# Patient Record
Sex: FEMALE | Race: WHITE | NOT HISPANIC OR LATINO | URBAN - METROPOLITAN AREA
[De-identification: names, ages, dates, MRNs, and addresses within clinical notes are randomized per-mention and may not be internally consistent; named-entity substitution may affect disease eponyms.]

---

## 2020-12-28 DIAGNOSIS — M19.90 UNSPECIFIED OSTEOARTHRITIS, UNSPECIFIED SITE: ICD-10-CM

## 2020-12-28 DIAGNOSIS — E78.5 HYPERLIPIDEMIA, UNSPECIFIED: ICD-10-CM

## 2020-12-28 DIAGNOSIS — Z87.81 PERSONAL HISTORY OF (HEALED) TRAUMATIC FRACTURE: ICD-10-CM

## 2020-12-29 ENCOUNTER — OUTPATIENT (OUTPATIENT)
Dept: OUTPATIENT SERVICES | Facility: HOSPITAL | Age: 75
LOS: 1 days | End: 2020-12-29
Payer: COMMERCIAL

## 2020-12-29 ENCOUNTER — APPOINTMENT (OUTPATIENT)
Dept: CARDIOTHORACIC SURGERY | Facility: CLINIC | Age: 75
End: 2020-12-29
Payer: MEDICARE

## 2020-12-29 VITALS
HEIGHT: 67 IN | DIASTOLIC BLOOD PRESSURE: 75 MMHG | WEIGHT: 145 LBS | SYSTOLIC BLOOD PRESSURE: 150 MMHG | BODY MASS INDEX: 22.76 KG/M2 | TEMPERATURE: 97.9 F | HEART RATE: 77 BPM | OXYGEN SATURATION: 98 % | RESPIRATION RATE: 13 BRPM

## 2020-12-29 DIAGNOSIS — I35.0 NONRHEUMATIC AORTIC (VALVE) STENOSIS: ICD-10-CM

## 2020-12-29 DIAGNOSIS — I10 ESSENTIAL (PRIMARY) HYPERTENSION: ICD-10-CM

## 2020-12-29 DIAGNOSIS — Z82.49 FAMILY HISTORY OF ISCHEMIC HEART DISEASE AND OTHER DISEASES OF THE CIRCULATORY SYSTEM: ICD-10-CM

## 2020-12-29 DIAGNOSIS — Z82.3 FAMILY HISTORY OF STROKE: ICD-10-CM

## 2020-12-29 DIAGNOSIS — G45.9 TRANSIENT CEREBRAL ISCHEMIC ATTACK, UNSPECIFIED: ICD-10-CM

## 2020-12-29 LAB
ALBUMIN SERPL ELPH-MCNC: 4.7 G/DL
ALP BLD-CCNC: 65 U/L
ALT SERPL-CCNC: 11 U/L
ANION GAP SERPL CALC-SCNC: 12 MMOL/L
AST SERPL-CCNC: 18 U/L
BASOPHILS # BLD AUTO: 0.05 K/UL
BASOPHILS NFR BLD AUTO: 0.9 %
BILIRUB SERPL-MCNC: 0.4 MG/DL
BUN SERPL-MCNC: 23 MG/DL
CALCIUM SERPL-MCNC: 10.1 MG/DL
CHLORIDE SERPL-SCNC: 100 MMOL/L
CO2 SERPL-SCNC: 25 MMOL/L
CREAT SERPL-MCNC: 0.6 MG/DL
EOSINOPHIL # BLD AUTO: 0.18 K/UL
EOSINOPHIL NFR BLD AUTO: 3.2 %
GLUCOSE SERPL-MCNC: 98 MG/DL
HCT VFR BLD CALC: 35.7 %
HGB BLD-MCNC: 11.3 G/DL
IMM GRANULOCYTES NFR BLD AUTO: 0.4 %
LYMPHOCYTES # BLD AUTO: 1.89 K/UL
LYMPHOCYTES NFR BLD AUTO: 34.1 %
MAN DIFF?: NORMAL
MCHC RBC-ENTMCNC: 29.8 PG
MCHC RBC-ENTMCNC: 31.7 GM/DL
MCV RBC AUTO: 94.2 FL
MONOCYTES # BLD AUTO: 0.61 K/UL
MONOCYTES NFR BLD AUTO: 11 %
NEUTROPHILS # BLD AUTO: 2.8 K/UL
NEUTROPHILS NFR BLD AUTO: 50.4 %
NT-PROBNP SERPL-MCNC: 768 PG/ML
PLATELET # BLD AUTO: 258 K/UL
POTASSIUM SERPL-SCNC: 3.7 MMOL/L
PROT SERPL-MCNC: 6.9 G/DL
RBC # BLD: 3.79 M/UL
RBC # FLD: 13.1 %
SODIUM SERPL-SCNC: 137 MMOL/L
WBC # FLD AUTO: 5.55 K/UL

## 2020-12-29 PROCEDURE — 93306 TTE W/DOPPLER COMPLETE: CPT

## 2020-12-29 PROCEDURE — 99072 ADDL SUPL MATRL&STAF TM PHE: CPT

## 2020-12-29 PROCEDURE — 99203 OFFICE O/P NEW LOW 30 MIN: CPT

## 2020-12-29 PROCEDURE — 93306 TTE W/DOPPLER COMPLETE: CPT | Mod: 26

## 2020-12-29 PROCEDURE — 93000 ELECTROCARDIOGRAM COMPLETE: CPT

## 2020-12-29 PROCEDURE — 99205 OFFICE O/P NEW HI 60 MIN: CPT

## 2020-12-29 NOTE — ASSESSMENT
[FreeTextEntry1] : Maggie is a 75 year old female with PMH of prior TIA, HTN, HLD, OA, multiple spine compression fractures, TIA and aortic valve stenosis. She was seen by her PCP and noted to have a heart murmur. Echocardiogram demonstrated  CHERYL 0.4, mGr 40, pGr 83, AoV 4.5 LVEF 70%.  She was referred for surgical consultation by Dr. Perez for consideration for RYAN. Her exercise tolerance is limited due to arthritis; she requires a walker for ambulation. She denies angina, dyspnea, PND, orthopnea, dizziness, or LE edema.

## 2020-12-29 NOTE — PHYSICAL EXAM
[General Appearance - Well Developed] : well developed [Normal Appearance] : normal appearance [General Appearance - Well Nourished] : well nourished [Normal Conjunctiva] : the conjunctiva exhibited no abnormalities [Normal Oral Mucosa] : normal oral mucosa [Normal Jugular Venous A Waves Present] : normal jugular venous A waves present [Normal Jugular Venous V Waves Present] : normal jugular venous V waves present [No Jugular Venous Bourgeois A Waves] : no jugular venous bourgeois A waves [Normal Rate] : normal [Rhythm Regular] : regular [IV] : a grade 4 [III] : a grade 3 [1+] : left 1+ [___ +] : bilateral [unfilled]U+ pretibial pitting edema [Respiration, Rhythm And Depth] : normal respiratory rhythm and effort [Auscultation Breath Sounds / Voice Sounds] : lungs were clear to auscultation bilaterally [Bowel Sounds] : normal bowel sounds [Abdomen Tenderness] : non-tender [Abnormal Walk] : normal gait [Cyanosis, Localized] : no localized cyanosis [Skin Turgor] : normal skin turgor [] : no rash [Oriented To Time, Place, And Person] : oriented to person, place, and time [Impaired Insight] : insight and judgment were intact [Affect] : the affect was normal

## 2020-12-29 NOTE — HISTORY OF PRESENT ILLNESS
[FreeTextEntry1] : Maggie is a 75 year old female with PMH of prior TIA, HTN, HLD, OA, multiple spine compression fractures, TIA and aortic valve stenosis. She was seen by her PCP and noted to have a heart murmur. Echocardiogram demonstrated  CHERYL 0.4, mGr 40, pGr 83, AoV 4.5 LVEF 70%.  She was referred for surgical consultation by Dr. Perez for consideration for RYAN. Her exercise tolerance is limited due to arthritis; she requires a walker for ambulation. She denies angina, dyspnea, PND, orthopnea, dizziness, or LE edema. \par \par \par  \par

## 2020-12-29 NOTE — REVIEW OF SYSTEMS
[Feeling Tired] : feeling tired [Joint Pain] : joint pain [Joint Swelling] : joint swelling [Joint Stiffness] : joint stiffness [Negative] : Heme/Lymph [Chest Pain] : no chest pain [Dizziness] : no dizziness [Fainting] : no fainting

## 2020-12-29 NOTE — PHYSICAL EXAM
[General Appearance - Alert] : alert [General Appearance - In No Acute Distress] : in no acute distress [Sclera] : the sclera and conjunctiva were normal [PERRL With Normal Accommodation] : pupils were equal in size, round, and reactive to light [Extraocular Movements] : extraocular movements were intact [Outer Ear] : the ears and nose were normal in appearance [Oropharynx] : the oropharynx was normal [Jugular Venous Distention Increased] : there was no jugular-venous distention [Respiration, Rhythm And Depth] : normal respiratory rhythm and effort [Auscultation Breath Sounds / Voice Sounds] : lungs were clear to auscultation bilaterally [IV] : a grade 4 [Examination Of The Chest] : the chest was normal in appearance [Right Carotid Bruit] : right carotid bruit heard [Left Carotid Bruit] : left carotid bruit heard [Breast Appearance] : normal in appearance [Bowel Sounds] : normal bowel sounds [Abdomen Soft] : soft [Cervical Lymph Nodes Enlarged Posterior Bilaterally] : posterior cervical [Cervical Lymph Nodes Enlarged Anterior Bilaterally] : anterior cervical [No CVA Tenderness] : no ~M costovertebral angle tenderness [Skin Color & Pigmentation] : normal skin color and pigmentation [Oriented To Time, Place, And Person] : oriented to person, place, and time [FreeTextEntry1] : deferred

## 2020-12-29 NOTE — CONSULT LETTER
[Dear  ___] : Dear ~LUIZ, [Courtesy Letter:] : I had the pleasure of seeing your patient, [unfilled], in my office today. [Please see my note below.] : Please see my note below. [Consult Closing:] : Thank you very much for allowing me to participate in the care of this patient.  If you have any questions, please do not hesitate to contact me. [Sincerely,] : Sincerely, [FreeTextEntry2] : Chio Perez MD\par [address] [facility] \[place] [address] [FreeTextEntry3] : Donta Madrid MD, FACS\par Attending Surgeon \par Cardiovascular & Thoracic Surgery\par  \par [employer] [facility] [employer] [employer]\par \par

## 2020-12-30 LAB
SARS-COV-2 IGG SERPL IA-ACNC: 5.19 INDEX
SARS-COV-2 IGG SERPL QL IA: POSITIVE

## 2020-12-30 NOTE — HISTORY OF PRESENT ILLNESS
[FreeTextEntry1] : Mrs. Zamora is a 75 year old female referred by Dr. Perez for evaluation of aortic stenosis. Her past medical history includes HTN, HLD, severe hip OA, multiple spinal compression fractures, and a prior TIA  in January 2014. Her PCP recently auscultated a murmur and referred her for further evaluation. Amlodipine was recently added for HTN. She used to swim in her building for exercise, but the pool was closed due to the COVID-19 pandemic, and has been very sedentary since that time. She thinks she was previously exposed to COVID but never became ill.\par \par Her exercise tolerance is also limited due to arthritis; she requires a walker for ambulation. She denies angina, dyspnea, PND, orthopnea, dizziness, or LE edema. She suffered a mechanical fall in October that exacerbated the pain in her left hip and further limited her activity level. \par \par Repeat echocardiogram today demonstrates severe AS with peak and mean gradients of 130 and 65mmHg, respectively, an AoV of 5.7m/s, and an CHERYL of  0.7 sq cm. There was noted LVOT calcification and a hyperdynamic LV with a mild gradient, but no significant LVOTO.\par \par She is very frustrated by her medical issues, which require frequent "back and forth" visits to doctor's offices. She has severe chronic pain due to her OA and scoliosis and notes it is even painful taking a car service here today. She notes she is not sure that she wants to have anything done at this point and wants "to spend the rest of my time at home doing what I like" which includes reading and watching TV. She notes having significant fatigue and has to nap frequently. She reports no angina. She gets out of breath "bending over to pick something up off the floor" or "standing up doing dishes for too long" (NYHA II); she is also bothered by the face masks. She has not syncopized.

## 2020-12-30 NOTE — REVIEW OF SYSTEMS
[Feeling Fatigued] : feeling fatigued [Eyeglasses] : currently wearing eyeglasses [Dyspnea on exertion] : dyspnea during exertion [Lower Ext Edema] : lower extremity edema [see HPI] : see HPI [Negative] : Endocrine [Fever] : no fever [Chills] : no chills [Shortness Of Breath] : no shortness of breath [Chest  Pressure] : no chest pressure [Abdominal Pain] : no abdominal pain [Nausea] : no nausea [Change in Appetite] : no change in appetite [Dizziness] : no dizziness [Confusion] : no confusion was observed [Anxiety] : no anxiety [Easy Bleeding] : no tendency for easy bleeding [Easy Bruising] : no tendency for easy bruising

## 2020-12-30 NOTE — DISCUSSION/SUMMARY
[Very Severe (Critical) Aortic Stenosis] : very severe (critical) aortic stenosis [Deteriorating] : deteriorating [Multidetector Cardiac CT] : multidetector cardiac CT [Cardiac Catheterization] : cardiac catheterization [Coronary Angiography] : coronary angiography [None] : none [Aortic Valve Replacement] : aortic valve replacement [Patient] : the patient [FreeTextEntry1] : Maggie has critical aortic stenosis with recently progressive symptoms and functional decline, which are multifactorial in etiology (she also has severe, mobility limiting arthritis). I have had an extensive discussion with her regarding the potential risks and benefits of TAVR, including but not limited to death, stroke, vascular complications, and the possible need for a pacemaker; the hope and anticipation would be to prolong her life and improve her QOL and functional capacity (understanding full well that she will still have some functional limitations due to her orthopedic issues). She is tearful and depressed, asking what the alternative option (i.e. medical therapy) would look like, and I explained that the prognosis would be very poor. She agrees to proceeding with scheduling her necessary testing in anticipation of proceeding with eventual TAVR. Full labs today. I have reviewed her TTE findings (critical AS) with her in detail.

## 2021-01-21 ENCOUNTER — APPOINTMENT (OUTPATIENT)
Dept: CARDIOLOGY | Facility: CLINIC | Age: 76
End: 2021-01-21

## 2021-01-21 ENCOUNTER — OUTPATIENT (OUTPATIENT)
Dept: OUTPATIENT SERVICES | Facility: HOSPITAL | Age: 76
LOS: 1 days | End: 2021-01-21
Payer: COMMERCIAL

## 2021-01-21 ENCOUNTER — RESULT REVIEW (OUTPATIENT)
Age: 76
End: 2021-01-21

## 2021-01-21 DIAGNOSIS — I35.0 NONRHEUMATIC AORTIC (VALVE) STENOSIS: ICD-10-CM

## 2021-01-21 DIAGNOSIS — R07.9 CHEST PAIN, UNSPECIFIED: ICD-10-CM

## 2021-01-21 PROCEDURE — 75572 CT HRT W/3D IMAGE: CPT

## 2021-01-21 PROCEDURE — 75572 CT HRT W/3D IMAGE: CPT | Mod: 26

## 2021-01-26 ENCOUNTER — LABORATORY RESULT (OUTPATIENT)
Age: 76
End: 2021-01-26

## 2021-01-26 ENCOUNTER — APPOINTMENT (OUTPATIENT)
Dept: DISASTER EMERGENCY | Facility: CLINIC | Age: 76
End: 2021-01-26

## 2021-01-29 ENCOUNTER — RESULT REVIEW (OUTPATIENT)
Age: 76
End: 2021-01-29

## 2021-01-29 ENCOUNTER — INPATIENT (INPATIENT)
Facility: HOSPITAL | Age: 76
LOS: 1 days | Discharge: HOME | DRG: 247 | End: 2021-01-31
Attending: STUDENT IN AN ORGANIZED HEALTH CARE EDUCATION/TRAINING PROGRAM | Admitting: INTERNAL MEDICINE
Payer: COMMERCIAL

## 2021-01-29 ENCOUNTER — TRANSCRIPTION ENCOUNTER (OUTPATIENT)
Age: 76
End: 2021-01-29

## 2021-01-29 VITALS
WEIGHT: 149.91 LBS | RESPIRATION RATE: 18 BRPM | SYSTOLIC BLOOD PRESSURE: 149 MMHG | HEIGHT: 67 IN | DIASTOLIC BLOOD PRESSURE: 93 MMHG | OXYGEN SATURATION: 98 % | TEMPERATURE: 98 F | HEART RATE: 87 BPM

## 2021-01-29 DIAGNOSIS — Z95.5 PRESENCE OF CORONARY ANGIOPLASTY IMPLANT AND GRAFT: Chronic | ICD-10-CM

## 2021-01-29 DIAGNOSIS — Z98.49 CATARACT EXTRACTION STATUS, UNSPECIFIED EYE: Chronic | ICD-10-CM

## 2021-01-29 DIAGNOSIS — I35.0 NONRHEUMATIC AORTIC (VALVE) STENOSIS: ICD-10-CM

## 2021-01-29 LAB
ALBUMIN SERPL ELPH-MCNC: 3.8 G/DL — SIGNIFICANT CHANGE UP (ref 3.3–5)
ALP SERPL-CCNC: 56 U/L — SIGNIFICANT CHANGE UP (ref 40–120)
ALT FLD-CCNC: 13 U/L — SIGNIFICANT CHANGE UP (ref 10–45)
ANION GAP SERPL CALC-SCNC: 12 MMOL/L — SIGNIFICANT CHANGE UP (ref 5–17)
APPEARANCE UR: ABNORMAL
AST SERPL-CCNC: 13 U/L — SIGNIFICANT CHANGE UP (ref 10–40)
BACTERIA # UR AUTO: ABNORMAL
BILIRUB DIRECT SERPL-MCNC: 0.1 MG/DL — SIGNIFICANT CHANGE UP (ref 0–0.2)
BILIRUB INDIRECT FLD-MCNC: 0.5 MG/DL — SIGNIFICANT CHANGE UP (ref 0.2–1)
BILIRUB SERPL-MCNC: 0.6 MG/DL — SIGNIFICANT CHANGE UP (ref 0.2–1.2)
BILIRUB UR-MCNC: NEGATIVE — SIGNIFICANT CHANGE UP
BLD GP AB SCN SERPL QL: NEGATIVE — SIGNIFICANT CHANGE UP
BUN SERPL-MCNC: 21 MG/DL — SIGNIFICANT CHANGE UP (ref 7–23)
CALCIUM SERPL-MCNC: 9.7 MG/DL — SIGNIFICANT CHANGE UP (ref 8.4–10.5)
CHLORIDE SERPL-SCNC: 100 MMOL/L — SIGNIFICANT CHANGE UP (ref 96–108)
CO2 SERPL-SCNC: 24 MMOL/L — SIGNIFICANT CHANGE UP (ref 22–31)
COLOR SPEC: ABNORMAL
CREAT SERPL-MCNC: 0.58 MG/DL — SIGNIFICANT CHANGE UP (ref 0.5–1.3)
DIFF PNL FLD: ABNORMAL
EPI CELLS # UR: 1 /HPF — SIGNIFICANT CHANGE UP
GLUCOSE SERPL-MCNC: 110 MG/DL — HIGH (ref 70–99)
GLUCOSE UR QL: NEGATIVE — SIGNIFICANT CHANGE UP
HCT VFR BLD CALC: 31.1 % — LOW (ref 34.5–45)
HCT VFR BLD CALC: 34.5 % — SIGNIFICANT CHANGE UP (ref 34.5–45)
HCT VFR BLD CALC: 36.7 % — SIGNIFICANT CHANGE UP (ref 34.5–45)
HGB BLD-MCNC: 10.5 G/DL — LOW (ref 11.5–15.5)
HGB BLD-MCNC: 11.3 G/DL — LOW (ref 11.5–15.5)
HGB BLD-MCNC: 12 G/DL — SIGNIFICANT CHANGE UP (ref 11.5–15.5)
INR BLD: 1 RATIO — SIGNIFICANT CHANGE UP (ref 0.88–1.16)
KETONES UR-MCNC: NEGATIVE — SIGNIFICANT CHANGE UP
LEUKOCYTE ESTERASE UR-ACNC: NEGATIVE — SIGNIFICANT CHANGE UP
MAGNESIUM SERPL-MCNC: 1.8 MG/DL — SIGNIFICANT CHANGE UP (ref 1.6–2.6)
MCHC RBC-ENTMCNC: 30 PG — SIGNIFICANT CHANGE UP (ref 27–34)
MCHC RBC-ENTMCNC: 30.5 PG — SIGNIFICANT CHANGE UP (ref 27–34)
MCHC RBC-ENTMCNC: 30.5 PG — SIGNIFICANT CHANGE UP (ref 27–34)
MCHC RBC-ENTMCNC: 32.7 GM/DL — SIGNIFICANT CHANGE UP (ref 32–36)
MCHC RBC-ENTMCNC: 32.8 GM/DL — SIGNIFICANT CHANGE UP (ref 32–36)
MCHC RBC-ENTMCNC: 33.8 GM/DL — SIGNIFICANT CHANGE UP (ref 32–36)
MCV RBC AUTO: 90.4 FL — SIGNIFICANT CHANGE UP (ref 80–100)
MCV RBC AUTO: 91.8 FL — SIGNIFICANT CHANGE UP (ref 80–100)
MCV RBC AUTO: 93 FL — SIGNIFICANT CHANGE UP (ref 80–100)
NITRITE UR-MCNC: NEGATIVE — SIGNIFICANT CHANGE UP
NRBC # BLD: 0 /100 WBCS — SIGNIFICANT CHANGE UP (ref 0–0)
PH UR: 8.5 — HIGH (ref 5–8)
PLATELET # BLD AUTO: 244 K/UL — SIGNIFICANT CHANGE UP (ref 150–400)
PLATELET # BLD AUTO: 270 K/UL — SIGNIFICANT CHANGE UP (ref 150–400)
PLATELET # BLD AUTO: 275 K/UL — SIGNIFICANT CHANGE UP (ref 150–400)
POTASSIUM SERPL-MCNC: 3.8 MMOL/L — SIGNIFICANT CHANGE UP (ref 3.5–5.3)
POTASSIUM SERPL-SCNC: 3.8 MMOL/L — SIGNIFICANT CHANGE UP (ref 3.5–5.3)
PROT SERPL-MCNC: 6.1 G/DL — SIGNIFICANT CHANGE UP (ref 6–8.3)
PROT UR-MCNC: ABNORMAL
PROTHROM AB SERPL-ACNC: 12 SEC — SIGNIFICANT CHANGE UP (ref 10.6–13.6)
RBC # BLD: 3.44 M/UL — LOW (ref 3.8–5.2)
RBC # BLD: 3.71 M/UL — LOW (ref 3.8–5.2)
RBC # BLD: 4 M/UL — SIGNIFICANT CHANGE UP (ref 3.8–5.2)
RBC # FLD: 13 % — SIGNIFICANT CHANGE UP (ref 10.3–14.5)
RBC # FLD: 13 % — SIGNIFICANT CHANGE UP (ref 10.3–14.5)
RBC # FLD: 13.3 % — SIGNIFICANT CHANGE UP (ref 10.3–14.5)
RBC CASTS # UR COMP ASSIST: >50 /HPF — HIGH (ref 0–4)
RH IG SCN BLD-IMP: POSITIVE — SIGNIFICANT CHANGE UP
SODIUM SERPL-SCNC: 136 MMOL/L — SIGNIFICANT CHANGE UP (ref 135–145)
SP GR SPEC: 1.03 — HIGH (ref 1.01–1.02)
UROBILINOGEN FLD QL: NEGATIVE — SIGNIFICANT CHANGE UP
WBC # BLD: 13.63 K/UL — HIGH (ref 3.8–10.5)
WBC # BLD: 5.68 K/UL — SIGNIFICANT CHANGE UP (ref 3.8–10.5)
WBC # BLD: 5.94 K/UL — SIGNIFICANT CHANGE UP (ref 3.8–10.5)
WBC # FLD AUTO: 13.63 K/UL — HIGH (ref 3.8–10.5)
WBC # FLD AUTO: 5.68 K/UL — SIGNIFICANT CHANGE UP (ref 3.8–10.5)
WBC # FLD AUTO: 5.94 K/UL — SIGNIFICANT CHANGE UP (ref 3.8–10.5)
WBC UR QL: 1 /HPF — SIGNIFICANT CHANGE UP (ref 0–5)

## 2021-01-29 PROCEDURE — 99222 1ST HOSP IP/OBS MODERATE 55: CPT

## 2021-01-29 PROCEDURE — 99152 MOD SED SAME PHYS/QHP 5/>YRS: CPT

## 2021-01-29 PROCEDURE — 93010 ELECTROCARDIOGRAM REPORT: CPT | Mod: 76,77

## 2021-01-29 PROCEDURE — 88112 CYTOPATH CELL ENHANCE TECH: CPT | Mod: 26

## 2021-01-29 PROCEDURE — 93454 CORONARY ARTERY ANGIO S&I: CPT | Mod: 26,59

## 2021-01-29 PROCEDURE — 92928 PRQ TCAT PLMT NTRAC ST 1 LES: CPT | Mod: LD

## 2021-01-29 PROCEDURE — 93010 ELECTROCARDIOGRAM REPORT: CPT | Mod: 59

## 2021-01-29 PROCEDURE — 74176 CT ABD & PELVIS W/O CONTRAST: CPT | Mod: 26

## 2021-01-29 RX ORDER — GABAPENTIN 400 MG/1
1 CAPSULE ORAL
Qty: 0 | Refills: 0 | DISCHARGE
Start: 2021-01-29

## 2021-01-29 RX ORDER — CLOPIDOGREL BISULFATE 75 MG/1
1 TABLET, FILM COATED ORAL
Qty: 90 | Refills: 1
Start: 2021-01-29 | End: 2021-07-27

## 2021-01-29 RX ORDER — CLOPIDOGREL BISULFATE 75 MG/1
75 TABLET, FILM COATED ORAL DAILY
Refills: 0 | Status: DISCONTINUED | OUTPATIENT
Start: 2021-01-30 | End: 2021-01-31

## 2021-01-29 RX ORDER — OXYBUTYNIN CHLORIDE 5 MG
5 TABLET ORAL THREE TIMES A DAY
Refills: 0 | Status: DISCONTINUED | OUTPATIENT
Start: 2021-01-29 | End: 2021-01-31

## 2021-01-29 RX ORDER — CLOPIDOGREL BISULFATE 75 MG/1
1 TABLET, FILM COATED ORAL
Qty: 0 | Refills: 1 | DISCHARGE
Start: 2021-01-29 | End: 2021-07-27

## 2021-01-29 RX ORDER — ASPIRIN/CALCIUM CARB/MAGNESIUM 324 MG
81 TABLET ORAL DAILY
Refills: 0 | Status: DISCONTINUED | OUTPATIENT
Start: 2021-01-30 | End: 2021-01-31

## 2021-01-29 RX ORDER — SODIUM CHLORIDE 9 MG/ML
250 INJECTION INTRAMUSCULAR; INTRAVENOUS; SUBCUTANEOUS ONCE
Refills: 0 | Status: COMPLETED | OUTPATIENT
Start: 2021-01-29 | End: 2021-01-29

## 2021-01-29 RX ORDER — IBUPROFEN 200 MG
1 TABLET ORAL
Qty: 0 | Refills: 0 | DISCHARGE

## 2021-01-29 RX ORDER — ACETAMINOPHEN 500 MG
1000 TABLET ORAL ONCE
Refills: 0 | Status: COMPLETED | OUTPATIENT
Start: 2021-01-29 | End: 2021-01-29

## 2021-01-29 RX ORDER — ASPIRIN/CALCIUM CARB/MAGNESIUM 324 MG
1 TABLET ORAL
Qty: 0 | Refills: 0 | DISCHARGE
Start: 2021-01-29

## 2021-01-29 RX ORDER — GABAPENTIN 400 MG/1
2 CAPSULE ORAL
Qty: 0 | Refills: 0 | DISCHARGE

## 2021-01-29 RX ORDER — LISINOPRIL 2.5 MG/1
5 TABLET ORAL DAILY
Refills: 0 | Status: DISCONTINUED | OUTPATIENT
Start: 2021-01-29 | End: 2021-01-31

## 2021-01-29 RX ORDER — AMLODIPINE BESYLATE 2.5 MG/1
5 TABLET ORAL DAILY
Refills: 0 | Status: DISCONTINUED | OUTPATIENT
Start: 2021-01-29 | End: 2021-01-31

## 2021-01-29 RX ORDER — GABAPENTIN 400 MG/1
300 CAPSULE ORAL THREE TIMES A DAY
Refills: 0 | Status: DISCONTINUED | OUTPATIENT
Start: 2021-01-29 | End: 2021-01-31

## 2021-01-29 RX ADMIN — Medication 400 MILLIGRAM(S): at 18:46

## 2021-01-29 RX ADMIN — GABAPENTIN 300 MILLIGRAM(S): 400 CAPSULE ORAL at 21:26

## 2021-01-29 RX ADMIN — SODIUM CHLORIDE 250 MILLILITER(S): 9 INJECTION INTRAMUSCULAR; INTRAVENOUS; SUBCUTANEOUS at 18:01

## 2021-01-29 RX ADMIN — Medication 5 MILLIGRAM(S): at 21:26

## 2021-01-29 NOTE — DISCHARGE NOTE PROVIDER - HOSPITAL COURSE
75 year old female with PMH of prior TIA, HTN, HLD, OA, multiple spine compression fractures, TIA and aortic valve stenosis. She was seen by her PCP and noted to have a heart murmur. Echocardiogram demonstrated CHERYL 0.4, mGr 40, pGr 83, AoV 4.5 LVEF 70%. She was referred for surgical consultation by Dr. Perez for consideration for RYAN. Her exercise tolerance is limited due to arthritis; she requires a walker for ambulation. She denies angina, dyspnea, PND, orthopnea, dizziness, or LE edema. s/p cardiac cath today KILEY - mLAD (80%).   (29 Jan 2021 13:03)    01/29/20 s/p [facility] KILEY x 1 ( ASA and Plavix )  75 year old female with PMH of prior TIA, HTN, HLD, OA, multiple spine compression fractures, TIA and aortic valve stenosis. She was seen by her PCP and noted to have a heart murmur. Echocardiogram demonstrated CHERYL 0.4, mGr 40, pGr 83, AoV 4.5 LVEF 70%. She was referred for surgical consultation by Dr. Perez for consideration for RYAN. Her exercise tolerance is limited due to arthritis; she requires a walker for ambulation. She denies angina, dyspnea, PND, orthopnea, dizziness, or LE edema. s/p cardiac cath today KILEY - mLAD (80%).   (29 Jan 2021 13:03)    01/29/20 s/p C KILEY x 1 ( ASA and Plavix )   S/P Hematuria now improved, f/u op pmd

## 2021-01-29 NOTE — CONSULT NOTE ADULT - ASSESSMENT
75 year old female with hematuria, s/p KILYE 1/29    - 75 year old female with hematuria, s/p KILEY 1/29    -fluid hydration as tolerated  -f/u CT findings  -obtain urinalysis, urine culture, and urine cytopathology  - 75 year old female with hematuria, s/p KILEY 1/29    -fluid hydration as tolerated to clear the urine  -f/u CT findings  -obtain urinalysis, urine culture, and urine cytopathology  -obtain PVR  - 75 year old female with hematuria, s/p KILEY 1/29    -fluid hydration as tolerated to clear the urine  -obtain urinalysis, urine culture, and urine cytopathology  -transfuse prn  -if pt develops clots in the urine will need a 3 way catheter and CBI -- notify urology if this occurs  -outpatient follow up for cystoscopy  -case d/w Dr Hastings

## 2021-01-29 NOTE — DISCHARGE NOTE PROVIDER - CARE PROVIDER_API CALL
Josesito Paula)  Interventional Cardiology  [address] [place] [address]  Phone: [phone]  Fax: [phone]  Follow Up Time:    Josesito Paula)  Interventional Cardiology  [address] [place] [address]  Phone: [phone]  Fax: [phone]  Follow Up Time:     Audrey tran  Phone: [phone]  Fax: (   )    -  Follow Up Time: 1 week

## 2021-01-29 NOTE — H&P CARDIOLOGY - HISTORY OF PRESENT ILLNESS
Mrs. Zamora is a 75 year old female with PMH of prior TIA, HTN, HLD, OA, multiple spine compression fractures, TIA and aortic valve stenosis. She was seen by her PCP and noted to have a heart murmur. Echocardiogram demonstrated CHERYL 0.4, mGr 40, pGr 83, AoV 4.5 LVEF 70%. She was referred for surgical consultation by Dr. Perez for consideration for RYAN. Her exercise tolerance is limited due to arthritis; she requires a walker for ambulation. She denies angina, dyspnea, PND, orthopnea, dizziness, or LE edema. s/p cardiac cath today KILEY - mLAD (80%).

## 2021-01-29 NOTE — H&P CARDIOLOGY - PMH
CAD (coronary artery disease)    Compression fracture of spine    HLD (hyperlipidemia)    Hypertension    OA (osteoarthritis)

## 2021-01-29 NOTE — DISCHARGE NOTE PROVIDER - NSDCMRMEDTOKEN_GEN_ALL_CORE_FT
amLODIPine 5 mg oral tablet: 1 tab(s) orally once a day  aspirin 81 mg oral delayed release tablet: 1 tab(s) orally once a day  clopidogrel 75 mg oral tablet: 1 tab(s) orally once a day  Ditropan 5 mg oral tablet: 1 tab(s) orally 3 times a day  gabapentin 300 mg oral capsule: 1 cap(s) orally 3 times a day  lovastatin 40 mg oral tablet: 1 tab(s) orally once a day  Neurontin 300 mg oral capsule: 1 cap(s) orally 3 times a day  ramipril 1.25 mg oral tablet: 1 tab(s) orally once a day   amLODIPine 5 mg oral tablet: 1 tab(s) orally once a day  aspirin 81 mg oral delayed release tablet: 1 tab(s) orally once a day  cefadroxil 500 mg oral capsule: 1 cap(s) orally every 12 hours   clopidogrel 75 mg oral tablet: 1 tab(s) orally once a day  Ditropan 5 mg oral tablet: 1 tab(s) orally 3 times a day  gabapentin 300 mg oral capsule: 1 cap(s) orally 3 times a day  lovastatin 40 mg oral tablet: 1 tab(s) orally once a day  Neurontin 300 mg oral capsule: 1 cap(s) orally 3 times a day  ramipril 1.25 mg oral tablet: 1 tab(s) orally once a day

## 2021-01-29 NOTE — PROVIDER CONTACT NOTE (CHANGE IN STATUS NOTIFICATION) - ASSESSMENT
Pt hypotensive (see float sheet for vs) primafit in place and 350 cc bright red bloody urine noted in canister

## 2021-01-29 NOTE — DISCHARGE NOTE PROVIDER - NSFTFHOMEHTHYNRD_GEN_ALL_CORE
VACCINE ADMINISTRATION RECORD  PARENT / GUARDIAN APPROVAL  Date: 2018  Vaccine administered to: Nica Frost     : 2018    MRN: [id number]    A copy of the appropriate Centers for Disease Control and Prevention Vaccine Information statement Yes

## 2021-01-29 NOTE — DISCHARGE NOTE PROVIDER - NSDCFUADDAPPT_GEN_ALL_CORE_FT
F/u outpatient Urology for possible cystoscopy  F/u outpatient Urology for possible cystoscopy   Follow up with pmd in 1 week to check Hematuria / bmp/cbc

## 2021-01-29 NOTE — DISCHARGE NOTE PROVIDER - CARE PROVIDERS DIRECT ADDRESSES
,[email or web address],DirectAddress_Unknown

## 2021-01-29 NOTE — DISCHARGE NOTE PROVIDER - NSDCCPTREATMENT_GEN_ALL_CORE_FT
PRINCIPAL PROCEDURE  Procedure: Left heart catheterization  Findings and Treatment: KILEY x 1 LAD

## 2021-01-29 NOTE — DISCHARGE NOTE PROVIDER - PROVIDER TOKENS
Addended by: KATEY RIVAS on: 11/27/2017 04:59 PM     Modules accepted: Orders     PROVIDER:[TOKEN:[[id number]]] PROVIDER:[TOKEN:[[id number]]],FREE:[LAST:[shteyman],FIRST:[Audrey],PHONE:[phone]],FAX:[(   )    -],FOLLOWUP:[1 week]]

## 2021-01-29 NOTE — CONSULT NOTE ADULT - SUBJECTIVE AND OBJECTIVE BOX
UROLOGY CONSULT NOTE    HPI:  This is a 75y old Female with PMHx of TIA, HTN, HLD, OA, s/p KILEY to the LAD today, who was found to have gorss hematuria following the procedure.  The patient received a dose of aspirin and 600mg of plavix prior to this occurring.  she states that she has never had an episode of hematuria.  She states that she has been prescribed oxybutynin by her PMD and denies any other  history.  Admits to feeling weak and having left hip pain, but denies fevers, chills, N/V, CP, SOB, abdominal or flank pain.      PAST MEDICAL HISTORY    Compression fracture of spine    OA (osteoarthritis)    CAD (coronary artery disease)    HLD (hyperlipidemia)    Hypertension        PAST SURGICAL HISTORY    History of cataract surgery    History of coronary artery stent placement        FAMILY HISTORY    Noncontributory    SOCIAL HISTORY    Former smoker for 20 yrs, quit 30 years ago    HOME MEDICATIONS    amLODIPine 5 mg oral tablet: 1 tab(s) orally once a day (29 Jan 2021 11:49)  Ditropan 5 mg oral tablet: 1 tab(s) orally 3 times a day (29 Jan 2021 11:49)  ibuprofen 400 mg oral tablet: 1 tab(s) orally every 6 hours (29 Jan 2021 11:49)  lovastatin 40 mg oral tablet: 1 tab(s) orally once a day (29 Jan 2021 11:49)  Neurontin 300 mg oral capsule: 1 cap(s) orally 3 times a day (29 Jan 2021 11:49)  Neurontin 300 mg oral capsule: 2 cap(s) orally once a day (at bedtime) (29 Jan 2021 11:49)  ramipril 1.25 mg oral tablet: 1 tab(s) orally once a day (29 Jan 2021 11:49)      DRUG ALLERGIES    Vicodin (Vomiting; Nausea)      REVIEW OF SYSTEMS: Pertinent positives and negatives as stated in HPI, otherwise negative      VITAL SIGNS    T(F): 97.5, Max: 97.7 (01-29-21 @ 09:01)  HR: 88  BP: 136/59  RR: 20  SpO2: 100%    I's & O's        PHYSICAL EXAM    Gen: Well groomed, well dressed, well nourished  Abd: Soft, NT, +suprapubic pressure  : +blood at meatus, no evidence of clots of vaginal bleeding  Ext: No edema present b/l      LABS:                        11.3   5.68  )-----------( 270               34.5     136  |  100  |  21  ----------------------------<  110  3.8   |  24  |  0.58    Ca    9.7  Mg     1.8              Urine culture:       IMAGING:      CT:  UROLOGY CONSULT NOTE    HPI:  This is a 75y old Female with PMHx of TIA, HTN, HLD, OA, s/p KILEY to the LAD today, who was found to have gorss hematuria following the procedure.  The patient received a dose of aspirin and 600mg of plavix prior to this occurring.  she states that she has never had an episode of hematuria.  She states that she has been prescribed oxybutynin by her PMD and denies any other  history.  Admits to feeling weak and having left hip pain, but denies fevers, chills, N/V, CP, SOB, abdominal or flank pain.  Pt is voiding via primafit catheter with bright red urine in the canister.      PAST MEDICAL HISTORY    Compression fracture of spine    OA (osteoarthritis)    CAD (coronary artery disease)    HLD (hyperlipidemia)    Hypertension        PAST SURGICAL HISTORY    History of cataract surgery    History of coronary artery stent placement        FAMILY HISTORY    Noncontributory    SOCIAL HISTORY    Former smoker for 20 yrs, quit 30 years ago    HOME MEDICATIONS    amLODIPine 5 mg oral tablet: 1 tab(s) orally once a day (29 Jan 2021 11:49)  Ditropan 5 mg oral tablet: 1 tab(s) orally 3 times a day (29 Jan 2021 11:49)  ibuprofen 400 mg oral tablet: 1 tab(s) orally every 6 hours (29 Jan 2021 11:49)  lovastatin 40 mg oral tablet: 1 tab(s) orally once a day (29 Jan 2021 11:49)  Neurontin 300 mg oral capsule: 1 cap(s) orally 3 times a day (29 Jan 2021 11:49)  Neurontin 300 mg oral capsule: 2 cap(s) orally once a day (at bedtime) (29 Jan 2021 11:49)  ramipril 1.25 mg oral tablet: 1 tab(s) orally once a day (29 Jan 2021 11:49)      DRUG ALLERGIES    Vicodin (Vomiting; Nausea)      REVIEW OF SYSTEMS: Pertinent positives and negatives as stated in HPI, otherwise negative      VITAL SIGNS    T(F): 97.5, Max: 97.7 (01-29-21 @ 09:01)  HR: 88  BP: 136/59  RR: 20  SpO2: 100%        PHYSICAL EXAM    Gen: Well groomed, well dressed, well nourished  Abd: Soft, NT, +suprapubic pressure  : +blood at meatus, no evidence of clots of vaginal bleeding; primafit catheter with bright red urine in the canister  Ext: No edema present b/l      LABS:                        11.3   5.68  )-----------( 270               34.5     136  |  100  |  21  ----------------------------<  110  3.8   |  24  |  0.58    Ca    9.7  Mg     1.8              Urine culture:       IMAGING:      CT:  UROLOGY CONSULT NOTE    HPI:  This is a 75y old Female with PMHx of TIA, HTN, HLD, OA, s/p KILEY to the LAD today, who was found to have gross hematuria following the procedure.  Afterwards, she became hypotensive to the 80s systolic and required 1L NS with good response.  The patient received a dose of aspirin and 600mg of plavix prior to this occurring.  She states that she has never had an episode of hematuria.  She states that she has been prescribed oxybutynin by her PMD and denies any other  history.  Admits to feeling weak and having left hip pain, but denies fevers, chills, N/V, CP, SOB, abdominal or flank pain.  Pt is voiding via primafit catheter with bright red urine in the canister.      PAST MEDICAL HISTORY    Compression fracture of spine    OA (osteoarthritis)    CAD (coronary artery disease)    HLD (hyperlipidemia)    Hypertension        PAST SURGICAL HISTORY    History of cataract surgery    History of coronary artery stent placement        FAMILY HISTORY    Noncontributory    SOCIAL HISTORY    Former smoker for 20 yrs, quit 30 years ago    HOME MEDICATIONS    amLODIPine 5 mg oral tablet: 1 tab(s) orally once a day (29 Jan 2021 11:49)  Ditropan 5 mg oral tablet: 1 tab(s) orally 3 times a day (29 Jan 2021 11:49)  ibuprofen 400 mg oral tablet: 1 tab(s) orally every 6 hours (29 Jan 2021 11:49)  lovastatin 40 mg oral tablet: 1 tab(s) orally once a day (29 Jan 2021 11:49)  Neurontin 300 mg oral capsule: 1 cap(s) orally 3 times a day (29 Jan 2021 11:49)  Neurontin 300 mg oral capsule: 2 cap(s) orally once a day (at bedtime) (29 Jan 2021 11:49)  ramipril 1.25 mg oral tablet: 1 tab(s) orally once a day (29 Jan 2021 11:49)      DRUG ALLERGIES    Vicodin (Vomiting; Nausea)      REVIEW OF SYSTEMS: Pertinent positives and negatives as stated in HPI, otherwise negative      VITAL SIGNS    T(F): 97.5, Max: 97.7 (01-29-21 @ 09:01)  HR: 88  BP: 136/59  RR: 20  SpO2: 100%        PHYSICAL EXAM    Gen: Well groomed, well dressed, well nourished  Abd: Soft, NT, +suprapubic pressure  : +blood at meatus, no evidence of clots of vaginal bleeding; primafit catheter with bright red urine in the canister  Ext: No edema present b/l      LABS:                        11.3   5.68  )-----------( 270               34.5     136  |  100  |  21  ----------------------------<  110  3.8   |  24  |  0.58    Ca    9.7  Mg     1.8              Urine culture:       IMAGING:      CT:  UROLOGY CONSULT NOTE    HPI:  This is a 75y old Female with PMHx of TIA, HTN, HLD, OA, s/p KILEY to the LAD today, who was found to have gross hematuria following the procedure.  Afterwards, she became hypotensive to the 80s systolic and required 1L NS with good response.  The patient received a dose of aspirin and 600mg of plavix prior to this occurring.  She states that she has never had an episode of hematuria.  She states that she has been prescribed oxybutynin by her PMD and denies any other  history.  Admits to feeling weak and having left hip pain, but denies fevers, chills, N/V, CP, SOB, abdominal or flank pain.  Pt is voiding via primafit catheter with bright red urine in the canister.  PVR - 147cc.      PAST MEDICAL HISTORY    Compression fracture of spine    OA (osteoarthritis)    CAD (coronary artery disease)    HLD (hyperlipidemia)    Hypertension        PAST SURGICAL HISTORY    History of cataract surgery    History of coronary artery stent placement        FAMILY HISTORY    Noncontributory    SOCIAL HISTORY    Former smoker for 20 yrs, quit 30 years ago    HOME MEDICATIONS    amLODIPine 5 mg oral tablet: 1 tab(s) orally once a day (29 Jan 2021 11:49)  Ditropan 5 mg oral tablet: 1 tab(s) orally 3 times a day (29 Jan 2021 11:49)  ibuprofen 400 mg oral tablet: 1 tab(s) orally every 6 hours (29 Jan 2021 11:49)  lovastatin 40 mg oral tablet: 1 tab(s) orally once a day (29 Jan 2021 11:49)  Neurontin 300 mg oral capsule: 1 cap(s) orally 3 times a day (29 Jan 2021 11:49)  Neurontin 300 mg oral capsule: 2 cap(s) orally once a day (at bedtime) (29 Jan 2021 11:49)  ramipril 1.25 mg oral tablet: 1 tab(s) orally once a day (29 Jan 2021 11:49)      DRUG ALLERGIES    Vicodin (Vomiting; Nausea)      REVIEW OF SYSTEMS: Pertinent positives and negatives as stated in HPI, otherwise negative      VITAL SIGNS    T(F): 97.5, Max: 97.7 (01-29-21 @ 09:01)  HR: 88  BP: 136/59  RR: 20  SpO2: 100%        PHYSICAL EXAM    Gen: Well groomed, well dressed, well nourished  Abd: Soft, NT, +suprapubic pressure  : +blood at meatus, no evidence of clots of vaginal bleeding; primafit catheter with bright red urine in the canister  Ext: No edema present b/l      LABS:                        11.3   5.68  )-----------( 270               34.5     136  |  100  |  21  ----------------------------<  110  3.8   |  24  |  0.58    Ca    9.7  Mg     1.8        Urine culture: pending results      IMAGING:      CT: Large amount of stool distending the rectum without wall thickening or adjacent changes to suggest stercoral colitis.

## 2021-01-29 NOTE — DISCHARGE NOTE PROVIDER - NSDCFUSCHEDAPPT_GEN_ALL_CORE_FT
RAFAEL NO ; [facility]2021 ; [facility]  RAFAEL NO ; 02/08/2021 ; [facility] [address]  RAFAEL NO ; 02/10/2021 ; [facility] PreAdmits

## 2021-01-29 NOTE — ASU PATIENT PROFILE, ADULT - AS SC BRADEN NUTRITION
I have personally performed a face to face diagnostic evaluation on this patient. I have reviewed the ACP note and agree with the history, exam and plan of care, except as noted.
(4) excellent

## 2021-01-29 NOTE — CHART NOTE - NSCHARTNOTEFT_GEN_A_CORE
Received pt in [facility] s/p KILEY - mLAD on DAPT, Right radial site intact, noted to be hypotensive 80/45 mmhg, pt noted to have acute hematuria, pt given NS bolus of 500cc, repeat /57, case D/w Dr Castanon, will obtain stat CBC, T & S & CT abd without contrast.

## 2021-01-29 NOTE — DISCHARGE NOTE PROVIDER - NSDCCPCAREPLAN_GEN_ALL_CORE_FT
PRINCIPAL DISCHARGE DIAGNOSIS  Diagnosis: CAD (coronary artery disease)  Assessment and Plan of Treatment: Pt remains chest pain free and understands post cath discharge instructions   No heavy lifting or pushing/pulling with procedure arm for 2 weeks. No driving for 2 days. You may shower 24 hours following the procedure but avoid baths/swimming for 1 week. Check your wrist site for bleeding and/or swelling daily following procedure and call your doctor immediately if it occurs or if you experience increased pain at the site. Follow up with your cardiologist in 1-2 weeks. You may call [facility] if you have any questions/concerns regarding your procedure [phone].        SECONDARY DISCHARGE DIAGNOSES  Diagnosis: HLD (hyperlipidemia)  Assessment and Plan of Treatment: Your LDL cholesterol will be less than 70mg/dL   Continue with your cholesterol medications. Eat a heart healthy diet that is low in saturated fats and salt, and includes whole grains, fruits, vegetables and lean protein; exercise regularly (consult with your physician or cardiologist first); maintain a heart healthy weight. Continue to follow with your primary physician or cardiologist for treatment goals, continue medication, have liver function testing every 3 months as anti lipid medications can cause liver irritation. If you smoke - quit (A resource to help you stop smoking is the [facility] for Tobacco Control – phone number [phone].).    Diagnosis: HTN (hypertension)  Assessment and Plan of Treatment: Your blood pressure will be controlled.   Continue with your blood pressure medications; eat a heart healthy diet with low salt diet; exercise regularly (consult with your physician or cardiologist first); maintain a heart healthy weight; if you smoke - quit (A resource to help you stop smoking is the [facility] for Tobacco Control – phone number [phone].); include healthy ways to manage stress. Continue to follow with your primary care physician or cardiologist.     PRINCIPAL DISCHARGE DIAGNOSIS  Diagnosis: CAD (coronary artery disease)  Assessment and Plan of Treatment: Pt remains chest pain free and understands post cath discharge instructions   No heavy lifting or pushing/pulling with procedure arm for 2 weeks. No driving for 2 days. You may shower 24 hours following the procedure but avoid baths/swimming for 1 week. Check your wrist site for bleeding and/or swelling daily following procedure and call your doctor immediately if it occurs or if you experience increased pain at the site. Follow up with your cardiologist in 1-2 weeks. You may call [facility] if you have any questions/concerns regarding your procedure [phone].        SECONDARY DISCHARGE DIAGNOSES  Diagnosis: UTI (urinary tract infection)  Assessment and Plan of Treatment: Continue all antibiotics as ordered    Diagnosis: HLD (hyperlipidemia)  Assessment and Plan of Treatment: Your LDL cholesterol will be less than 70mg/dL   Continue with your cholesterol medications. Eat a heart healthy diet that is low in saturated fats and salt, and includes whole grains, fruits, vegetables and lean protein; exercise regularly (consult with your physician or cardiologist first); maintain a heart healthy weight. Continue to follow with your primary physician or cardiologist for treatment goals, continue medication, have liver function testing every 3 months as anti lipid medications can cause liver irritation. If you smoke - quit (A resource to help you stop smoking is the [facility] for Tobacco Control – phone number [phone].).    Diagnosis: Hematuria  Assessment and Plan of Treatment: Hematuria: Follow up with outpatient pmd in 1 week    Diagnosis: HTN (hypertension)  Assessment and Plan of Treatment: Your blood pressure will be controlled.   Continue with your blood pressure medications; eat a heart healthy diet with low salt diet; exercise regularly (consult with your physician or cardiologist first); maintain a heart healthy weight; if you smoke - quit (A resource to help you stop smoking is the [facility] for Tobacco Control – phone number [phone].); include healthy ways to manage stress. Continue to follow with your primary care physician or cardiologist.

## 2021-01-30 DIAGNOSIS — I25.10 ATHEROSCLEROTIC HEART DISEASE OF NATIVE CORONARY ARTERY WITHOUT ANGINA PECTORIS: ICD-10-CM

## 2021-01-30 DIAGNOSIS — I10 ESSENTIAL (PRIMARY) HYPERTENSION: ICD-10-CM

## 2021-01-30 DIAGNOSIS — E78.5 HYPERLIPIDEMIA, UNSPECIFIED: ICD-10-CM

## 2021-01-30 DIAGNOSIS — R31.9 HEMATURIA, UNSPECIFIED: ICD-10-CM

## 2021-01-30 LAB
ANION GAP SERPL CALC-SCNC: 10 MMOL/L — SIGNIFICANT CHANGE UP (ref 5–17)
BUN SERPL-MCNC: 27 MG/DL — HIGH (ref 7–23)
CALCIUM SERPL-MCNC: 8.8 MG/DL — SIGNIFICANT CHANGE UP (ref 8.4–10.5)
CHLORIDE SERPL-SCNC: 102 MMOL/L — SIGNIFICANT CHANGE UP (ref 96–108)
CO2 SERPL-SCNC: 22 MMOL/L — SIGNIFICANT CHANGE UP (ref 22–31)
CREAT SERPL-MCNC: 0.64 MG/DL — SIGNIFICANT CHANGE UP (ref 0.5–1.3)
GLUCOSE SERPL-MCNC: 139 MG/DL — HIGH (ref 70–99)
HCT VFR BLD CALC: 27.1 % — LOW (ref 34.5–45)
HCT VFR BLD CALC: 30.1 % — LOW (ref 34.5–45)
HGB BLD-MCNC: 9 G/DL — LOW (ref 11.5–15.5)
HGB BLD-MCNC: 9.8 G/DL — LOW (ref 11.5–15.5)
MCHC RBC-ENTMCNC: 29.9 PG — SIGNIFICANT CHANGE UP (ref 27–34)
MCHC RBC-ENTMCNC: 30.4 PG — SIGNIFICANT CHANGE UP (ref 27–34)
MCHC RBC-ENTMCNC: 32.6 GM/DL — SIGNIFICANT CHANGE UP (ref 32–36)
MCHC RBC-ENTMCNC: 33.2 GM/DL — SIGNIFICANT CHANGE UP (ref 32–36)
MCV RBC AUTO: 91.6 FL — SIGNIFICANT CHANGE UP (ref 80–100)
MCV RBC AUTO: 91.8 FL — SIGNIFICANT CHANGE UP (ref 80–100)
NRBC # BLD: 0 /100 WBCS — SIGNIFICANT CHANGE UP (ref 0–0)
NRBC # BLD: 0 /100 WBCS — SIGNIFICANT CHANGE UP (ref 0–0)
PLATELET # BLD AUTO: 207 K/UL — SIGNIFICANT CHANGE UP (ref 150–400)
PLATELET # BLD AUTO: 233 K/UL — SIGNIFICANT CHANGE UP (ref 150–400)
POTASSIUM SERPL-MCNC: 3.5 MMOL/L — SIGNIFICANT CHANGE UP (ref 3.5–5.3)
POTASSIUM SERPL-SCNC: 3.5 MMOL/L — SIGNIFICANT CHANGE UP (ref 3.5–5.3)
RBC # BLD: 2.96 M/UL — LOW (ref 3.8–5.2)
RBC # BLD: 3.28 M/UL — LOW (ref 3.8–5.2)
RBC # FLD: 13.2 % — SIGNIFICANT CHANGE UP (ref 10.3–14.5)
RBC # FLD: 13.2 % — SIGNIFICANT CHANGE UP (ref 10.3–14.5)
SODIUM SERPL-SCNC: 134 MMOL/L — LOW (ref 135–145)
WBC # BLD: 13.65 K/UL — HIGH (ref 3.8–10.5)
WBC # BLD: 8.21 K/UL — SIGNIFICANT CHANGE UP (ref 3.8–10.5)
WBC # FLD AUTO: 13.65 K/UL — HIGH (ref 3.8–10.5)
WBC # FLD AUTO: 8.21 K/UL — SIGNIFICANT CHANGE UP (ref 3.8–10.5)

## 2021-01-30 PROCEDURE — 99233 SBSQ HOSP IP/OBS HIGH 50: CPT

## 2021-01-30 RX ORDER — ACETAMINOPHEN 500 MG
650 TABLET ORAL ONCE
Refills: 0 | Status: COMPLETED | OUTPATIENT
Start: 2021-01-30 | End: 2021-01-30

## 2021-01-30 RX ORDER — CEPHALEXIN 500 MG
250 CAPSULE ORAL EVERY 6 HOURS
Refills: 0 | Status: DISCONTINUED | OUTPATIENT
Start: 2021-01-30 | End: 2021-01-30

## 2021-01-30 RX ORDER — CEFTRIAXONE 500 MG/1
1000 INJECTION, POWDER, FOR SOLUTION INTRAMUSCULAR; INTRAVENOUS EVERY 24 HOURS
Refills: 0 | Status: DISCONTINUED | OUTPATIENT
Start: 2021-01-30 | End: 2021-01-31

## 2021-01-30 RX ADMIN — GABAPENTIN 300 MILLIGRAM(S): 400 CAPSULE ORAL at 13:16

## 2021-01-30 RX ADMIN — Medication 81 MILLIGRAM(S): at 08:18

## 2021-01-30 RX ADMIN — CEFTRIAXONE 100 MILLIGRAM(S): 500 INJECTION, POWDER, FOR SOLUTION INTRAMUSCULAR; INTRAVENOUS at 17:58

## 2021-01-30 RX ADMIN — AMLODIPINE BESYLATE 5 MILLIGRAM(S): 2.5 TABLET ORAL at 08:16

## 2021-01-30 RX ADMIN — Medication 650 MILLIGRAM(S): at 22:27

## 2021-01-30 RX ADMIN — GABAPENTIN 300 MILLIGRAM(S): 400 CAPSULE ORAL at 05:51

## 2021-01-30 RX ADMIN — Medication 5 MILLIGRAM(S): at 05:51

## 2021-01-30 RX ADMIN — CLOPIDOGREL BISULFATE 75 MILLIGRAM(S): 75 TABLET, FILM COATED ORAL at 08:18

## 2021-01-30 RX ADMIN — Medication 5 MILLIGRAM(S): at 13:58

## 2021-01-30 RX ADMIN — Medication 5 MILLIGRAM(S): at 23:44

## 2021-01-30 NOTE — PROGRESS NOTE ADULT - ASSESSMENT
75 year old female with PMH of prior TIA, HTN, HLD, OA, multiple spine compression fractures, TIA and aortic valve stenosis. She was seen by her PCP and noted to have a heart murmur. Echocardiogram demonstrated CHERYL 0.4, mGr 40, pGr 83, AoV 4.5 LVEF 70%. She was referred for surgical consultation by Dr. Perez for consideration for RYAN. Her exercise tolerance is limited due to arthritis; she requires a walker for ambulation. She denies angina, dyspnea, PND, orthopnea, dizziness, or LE edema. s/p cardiac cath today KILEY - mLAD (80%).   (29 Jan 2021 13:03)    01/29/20 s/p [facility] KILEY x 1 ( ASA and Plavix )

## 2021-01-30 NOTE — PROGRESS NOTE ADULT - ASSESSMENT
75F with PMH of prior TIA, HTN, HLD, OA, multiple spine compression fractures, TIA and aortic valve stenosis who presented for elective PCI, s/p cardiac cath today KILEY - mLAD (80%). Course c/b by hematuria     Problem/Plan - 1:  ·  Problem: CAD (coronary artery disease).  Plan: S/p [facility] 1/29 with KILEY to LAD  Continue with asa 81 mg daily  Continue with Plavix 75mg daily.   Appreciate cardiology recs     Problem/Plan - 2:  ·  Problem: HTN (hypertension).  Plan: BP at goal   Continue with ramipril 1.25mg daily   Continue with amlodipine 5mg daily.      Problem/Plan - 3:  ·  Problem: HLD (hyperlipidemia).  Plan: Continue with lovastatin 40mg daily.      Problem/Plan - 4:  ·  Problem: Hematuria.  Plan: resolved; likely secondary to UTI  Urine cx with GNR; f/u final sensitivities  Start Keflex X 5 days  Urology outpatient f/u for cystoscopy   75F with PMH of prior TIA, HTN, HLD, OA, multiple spine compression fractures, TIA and aortic valve stenosis who presented for elective PCI, s/p cardiac cath today KILEY - mLAD (80%). Course c/b by hematuria     Problem/Plan - 1:  ·  Problem: CAD (coronary artery disease).  Plan: S/p [facility] 1/29 with KILEY to LAD  Continue with asa 81 mg daily  Continue with Plavix 75mg daily.   Appreciate cardiology recs     Problem/Plan - 2:  ·  Problem: HTN (hypertension).  Plan: BP at goal   Continue with ramipril 1.25mg daily   Continue with amlodipine 5mg daily.      Problem/Plan - 3:  ·  Problem: HLD (hyperlipidemia).  Plan: Continue with lovastatin 40mg daily.      Problem/Plan - 4:  ·  Problem: Hematuria.  Plan: likely secondary to UTI  Urine cx with GNR; f/u final sensitivities  Start ceftriaxone  If worsens, will reconsult urology  Urology outpatient f/u for cystoscopy

## 2021-01-30 NOTE — PROGRESS NOTE ADULT - SUBJECTIVE AND OBJECTIVE BOX
[facility] Division of Hospital Medicine  Nedra Ramos MD  Pager (M-F, 8A-5P): [phone]  Other Times:  [phone]    Patient is a 75y old  Female who presents with a chief complaint of CAD (2021 02:16)      SUBJECTIVE / OVERNIGHT EVENTS: No acute events. No further episodes of hematuria    ADDITIONAL REVIEW OF SYSTEMS: negative    MEDICATIONS  (STANDING):  amLODIPine   Tablet 5 milliGRAM(s) Oral daily  aspirin enteric coated 81 milliGRAM(s) Oral daily  clopidogrel Tablet 75 milliGRAM(s) Oral daily  gabapentin 300 milliGRAM(s) Oral three times a day  lisinopril 5 milliGRAM(s) Oral daily  oxybutynin 5 milliGRAM(s) Oral three times a day    I&O's Summary    2021 07:01  -  2021 07:00  --------------------------------------------------------  IN: 0 mL / OUT: 900 mL / NET: -900 mL    2021 07:01  -  2021 15:29  --------------------------------------------------------  IN: 520 mL / OUT: 0 mL / NET: 520 mL        PHYSICAL EXAM:  Vital Signs Last 24 Hrs  T(C): 36.8 (2021 13:56), Max: 37 (2021 11:04)  T(F): 98.2 (2021 13:56), Max: 98.6 (2021 11:04)  HR: 101 (2021 13:56) (81 - 103)  BP: 128/74 (2021 13:56) (90/47 - 128/74)  BP(mean): --  RR: 16 (2021 13:56) (16 - 20)  SpO2: 98% (2021 13:56) (98% - 100%)    GENERAL: Well appearing elderly woman, in NAD  EYES: EOMI, conjunctiva and sclera clear  ENT: moist mucosa, no pharyngeal erythema  NECK: supple, no JVD  LUNG: Clear to auscultation bilaterally; No rales, rhonchi, wheezing, or rubs.   CVS: Regular rate and rhythm; + murmur, no rubs, or gallops  ABDOMEN: Soft, non tender, nondistended. +Bowel sounds.  EXTREMITIES:  no edema, no cyanosis  NEURO:  Alert & Oriented X3,  No focal deficits  PSYCH: Normal affect, normal mood  MUSCULOSKELETAL: FROM, no joint swelling  Skin: warm and dry, normal color  Other Physical Exam Findings: R radial access site intact with no bleeding or hematoma    LABS:                        9.8    13.65 )-----------( 233      ( 2021 03:48 )             30.1         134<L>  |  102  |  27<H>  ----------------------------<  139<H>  3.5   |  22  |  0.64    Ca    8.8      2021 03:48  Mg     1.8         TPro  6.1  /  Alb  3.8  /  TBili  0.6  /  DBili  0.1  /  AST  13  /  ALT  13  /  AlkPhos  56      PT/INR - ( 2021 19:47 )   PT: 12.0 sec;   INR: 1.00 ratio               Urinalysis Basic - ( 2021 16:22 )    Color: Red / Appearance: Turbid / S.035 / pH: x  Gluc: x / Ketone: Negative  / Bili: Negative / Urobili: Negative   Blood: x / Protein: 300 mg/dL / Nitrite: Negative   Leuk Esterase: Negative / RBC: >50 /hpf / WBC 1 /HPF   Sq Epi: x / Non Sq Epi: 1 /hpf / Bacteria: Moderate        Culture - Urine (collected 2021 19:20)  Source: .Urine Clean Catch (Midstream)  Preliminary Report (2021 14:56):    >100,000 CFU/ml Gram Negative Rods     [facility] Division of Hospital Medicine  Nedra Ramos MD  Pager (M-F, 8A-5P): [phone]  Other Times:  [phone]    Patient is a 75y old  Female who presents with a chief complaint of CAD (2021 02:16)      SUBJECTIVE / OVERNIGHT EVENTS: No acute events. Still with slight hematuria, no clots    ADDITIONAL REVIEW OF SYSTEMS: negative    MEDICATIONS  (STANDING):  amLODIPine   Tablet 5 milliGRAM(s) Oral daily  aspirin enteric coated 81 milliGRAM(s) Oral daily  clopidogrel Tablet 75 milliGRAM(s) Oral daily  gabapentin 300 milliGRAM(s) Oral three times a day  lisinopril 5 milliGRAM(s) Oral daily  oxybutynin 5 milliGRAM(s) Oral three times a day    I&O's Summary    2021 07:01  -  2021 07:00  --------------------------------------------------------  IN: 0 mL / OUT: 900 mL / NET: -900 mL    2021 07:01  -  2021 15:29  --------------------------------------------------------  IN: 520 mL / OUT: 0 mL / NET: 520 mL        PHYSICAL EXAM:  Vital Signs Last 24 Hrs  T(C): 36.8 (2021 13:56), Max: 37 (2021 11:04)  T(F): 98.2 (2021 13:56), Max: 98.6 (2021 11:04)  HR: 101 (2021 13:56) (81 - 103)  BP: 128/74 (2021 13:56) (90/47 - 128/74)  BP(mean): --  RR: 16 (2021 13:56) (16 - 20)  SpO2: 98% (2021 13:56) (98% - 100%)    GENERAL: Well appearing elderly woman, in NAD  EYES: EOMI, conjunctiva and sclera clear  ENT: moist mucosa, no pharyngeal erythema  NECK: supple, no JVD  LUNG: Clear to auscultation bilaterally; No rales, rhonchi, wheezing, or rubs.   CVS: Regular rate and rhythm; + murmur, no rubs, or gallops  ABDOMEN: Soft, non tender, nondistended. +Bowel sounds.  EXTREMITIES:  no edema, no cyanosis  NEURO:  Alert & Oriented X3,  No focal deficits  PSYCH: Normal affect, normal mood  MUSCULOSKELETAL: FROM, no joint swelling  Skin: warm and dry, normal color  Other Physical Exam Findings: R radial access site intact with no bleeding or hematoma    LABS:                        9.8    13.65 )-----------( 233      ( 2021 03:48 )             30.1         134<L>  |  102  |  27<H>  ----------------------------<  139<H>  3.5   |  22  |  0.64    Ca    8.8      2021 03:48  Mg     1.8         TPro  6.1  /  Alb  3.8  /  TBili  0.6  /  DBili  0.1  /  AST  13  /  ALT  13  /  AlkPhos  56      PT/INR - ( 2021 19:47 )   PT: 12.0 sec;   INR: 1.00 ratio               Urinalysis Basic - ( 2021 16:22 )    Color: Red / Appearance: Turbid / S.035 / pH: x  Gluc: x / Ketone: Negative  / Bili: Negative / Urobili: Negative   Blood: x / Protein: 300 mg/dL / Nitrite: Negative   Leuk Esterase: Negative / RBC: >50 /hpf / WBC 1 /HPF   Sq Epi: x / Non Sq Epi: 1 /hpf / Bacteria: Moderate        Culture - Urine (collected 2021 19:20)  Source: .Urine Clean Catch (Midstream)  Preliminary Report (2021 14:56):    >100,000 CFU/ml Gram Negative Rods

## 2021-01-30 NOTE — PROGRESS NOTE ADULT - PROBLEM SELECTOR PLAN 4
Obtain urinalysis, urine culture, and urine cytopathology  If pt develops clots in the urine will need a 3 way catheter and CBI -- notify urology if this occurs  Outpatient follow up for cystoscopy

## 2021-01-31 ENCOUNTER — TRANSCRIPTION ENCOUNTER (OUTPATIENT)
Age: 76
End: 2021-01-31

## 2021-01-31 VITALS
OXYGEN SATURATION: 98 % | RESPIRATION RATE: 16 BRPM | SYSTOLIC BLOOD PRESSURE: 142 MMHG | HEART RATE: 88 BPM | DIASTOLIC BLOOD PRESSURE: 56 MMHG | TEMPERATURE: 99 F

## 2021-01-31 LAB
-  AMIKACIN: SIGNIFICANT CHANGE UP
-  AMOXICILLIN/CLAVULANIC ACID: SIGNIFICANT CHANGE UP
-  AMPICILLIN/SULBACTAM: SIGNIFICANT CHANGE UP
-  AMPICILLIN: SIGNIFICANT CHANGE UP
-  AZTREONAM: SIGNIFICANT CHANGE UP
-  CEFAZOLIN: SIGNIFICANT CHANGE UP
-  CEFEPIME: SIGNIFICANT CHANGE UP
-  CEFOXITIN: SIGNIFICANT CHANGE UP
-  CEFTRIAXONE: SIGNIFICANT CHANGE UP
-  CIPROFLOXACIN: SIGNIFICANT CHANGE UP
-  ERTAPENEM: SIGNIFICANT CHANGE UP
-  GENTAMICIN: SIGNIFICANT CHANGE UP
-  IMIPENEM: SIGNIFICANT CHANGE UP
-  LEVOFLOXACIN: SIGNIFICANT CHANGE UP
-  MEROPENEM: SIGNIFICANT CHANGE UP
-  NITROFURANTOIN: SIGNIFICANT CHANGE UP
-  PIPERACILLIN/TAZOBACTAM: SIGNIFICANT CHANGE UP
-  TIGECYCLINE: SIGNIFICANT CHANGE UP
-  TOBRAMYCIN: SIGNIFICANT CHANGE UP
-  TRIMETHOPRIM/SULFAMETHOXAZOLE: SIGNIFICANT CHANGE UP
ANION GAP SERPL CALC-SCNC: 10 MMOL/L — SIGNIFICANT CHANGE UP (ref 5–17)
BUN SERPL-MCNC: 18 MG/DL — SIGNIFICANT CHANGE UP (ref 7–23)
CALCIUM SERPL-MCNC: 8.8 MG/DL — SIGNIFICANT CHANGE UP (ref 8.4–10.5)
CHLORIDE SERPL-SCNC: 102 MMOL/L — SIGNIFICANT CHANGE UP (ref 96–108)
CO2 SERPL-SCNC: 23 MMOL/L — SIGNIFICANT CHANGE UP (ref 22–31)
CREAT SERPL-MCNC: 0.59 MG/DL — SIGNIFICANT CHANGE UP (ref 0.5–1.3)
CULTURE RESULTS: SIGNIFICANT CHANGE UP
GLUCOSE BLDC GLUCOMTR-MCNC: 113 MG/DL — HIGH (ref 70–99)
GLUCOSE SERPL-MCNC: 117 MG/DL — HIGH (ref 70–99)
HCT VFR BLD CALC: 28.5 % — LOW (ref 34.5–45)
HGB BLD-MCNC: 9.3 G/DL — LOW (ref 11.5–15.5)
MCHC RBC-ENTMCNC: 30.2 PG — SIGNIFICANT CHANGE UP (ref 27–34)
MCHC RBC-ENTMCNC: 32.6 GM/DL — SIGNIFICANT CHANGE UP (ref 32–36)
MCV RBC AUTO: 92.5 FL — SIGNIFICANT CHANGE UP (ref 80–100)
METHOD TYPE: SIGNIFICANT CHANGE UP
NRBC # BLD: 0 /100 WBCS — SIGNIFICANT CHANGE UP (ref 0–0)
ORGANISM # SPEC MICROSCOPIC CNT: SIGNIFICANT CHANGE UP
ORGANISM # SPEC MICROSCOPIC CNT: SIGNIFICANT CHANGE UP
PLATELET # BLD AUTO: 218 K/UL — SIGNIFICANT CHANGE UP (ref 150–400)
POTASSIUM SERPL-MCNC: 3.8 MMOL/L — SIGNIFICANT CHANGE UP (ref 3.5–5.3)
POTASSIUM SERPL-SCNC: 3.8 MMOL/L — SIGNIFICANT CHANGE UP (ref 3.5–5.3)
RBC # BLD: 3.08 M/UL — LOW (ref 3.8–5.2)
RBC # FLD: 13.3 % — SIGNIFICANT CHANGE UP (ref 10.3–14.5)
SODIUM SERPL-SCNC: 135 MMOL/L — SIGNIFICANT CHANGE UP (ref 135–145)
SPECIMEN SOURCE: SIGNIFICANT CHANGE UP
WBC # BLD: 8.72 K/UL — SIGNIFICANT CHANGE UP (ref 3.8–10.5)
WBC # FLD AUTO: 8.72 K/UL — SIGNIFICANT CHANGE UP (ref 3.8–10.5)

## 2021-01-31 PROCEDURE — C1769: CPT

## 2021-01-31 PROCEDURE — 82962 GLUCOSE BLOOD TEST: CPT

## 2021-01-31 PROCEDURE — 87186 SC STD MICRODIL/AGAR DIL: CPT

## 2021-01-31 PROCEDURE — 81001 URINALYSIS AUTO W/SCOPE: CPT

## 2021-01-31 PROCEDURE — C1894: CPT

## 2021-01-31 PROCEDURE — 80076 HEPATIC FUNCTION PANEL: CPT

## 2021-01-31 PROCEDURE — 97162 PT EVAL MOD COMPLEX 30 MIN: CPT

## 2021-01-31 PROCEDURE — 86900 BLOOD TYPING SEROLOGIC ABO: CPT

## 2021-01-31 PROCEDURE — C1887: CPT

## 2021-01-31 PROCEDURE — 86901 BLOOD TYPING SEROLOGIC RH(D): CPT

## 2021-01-31 PROCEDURE — 87086 URINE CULTURE/COLONY COUNT: CPT

## 2021-01-31 PROCEDURE — C9600: CPT | Mod: LD

## 2021-01-31 PROCEDURE — 99239 HOSP IP/OBS DSCHRG MGMT >30: CPT

## 2021-01-31 PROCEDURE — 93454 CORONARY ARTERY ANGIO S&I: CPT | Mod: 59

## 2021-01-31 PROCEDURE — 74176 CT ABD & PELVIS W/O CONTRAST: CPT

## 2021-01-31 PROCEDURE — 80048 BASIC METABOLIC PNL TOTAL CA: CPT

## 2021-01-31 PROCEDURE — 99152 MOD SED SAME PHYS/QHP 5/>YRS: CPT

## 2021-01-31 PROCEDURE — 83735 ASSAY OF MAGNESIUM: CPT

## 2021-01-31 PROCEDURE — 85610 PROTHROMBIN TIME: CPT

## 2021-01-31 PROCEDURE — 85027 COMPLETE CBC AUTOMATED: CPT

## 2021-01-31 PROCEDURE — 88112 CYTOPATH CELL ENHANCE TECH: CPT

## 2021-01-31 PROCEDURE — 86850 RBC ANTIBODY SCREEN: CPT

## 2021-01-31 PROCEDURE — C1874: CPT

## 2021-01-31 PROCEDURE — 93005 ELECTROCARDIOGRAM TRACING: CPT

## 2021-01-31 RX ORDER — ACETAMINOPHEN 500 MG
650 TABLET ORAL ONCE
Refills: 0 | Status: COMPLETED | OUTPATIENT
Start: 2021-01-31 | End: 2021-01-31

## 2021-01-31 RX ORDER — ATORVASTATIN CALCIUM 80 MG/1
40 TABLET, FILM COATED ORAL AT BEDTIME
Refills: 0 | Status: DISCONTINUED | OUTPATIENT
Start: 2021-01-31 | End: 2021-01-31

## 2021-01-31 RX ADMIN — CEFTRIAXONE 100 MILLIGRAM(S): 500 INJECTION, POWDER, FOR SOLUTION INTRAMUSCULAR; INTRAVENOUS at 15:18

## 2021-01-31 RX ADMIN — AMLODIPINE BESYLATE 5 MILLIGRAM(S): 2.5 TABLET ORAL at 08:11

## 2021-01-31 RX ADMIN — Medication 5 MILLIGRAM(S): at 05:24

## 2021-01-31 RX ADMIN — Medication 5 MILLIGRAM(S): at 15:19

## 2021-01-31 RX ADMIN — GABAPENTIN 300 MILLIGRAM(S): 400 CAPSULE ORAL at 12:16

## 2021-01-31 RX ADMIN — GABAPENTIN 300 MILLIGRAM(S): 400 CAPSULE ORAL at 00:04

## 2021-01-31 RX ADMIN — GABAPENTIN 300 MILLIGRAM(S): 400 CAPSULE ORAL at 06:07

## 2021-01-31 RX ADMIN — CLOPIDOGREL BISULFATE 75 MILLIGRAM(S): 75 TABLET, FILM COATED ORAL at 12:16

## 2021-01-31 RX ADMIN — Medication 81 MILLIGRAM(S): at 12:16

## 2021-01-31 RX ADMIN — Medication 650 MILLIGRAM(S): at 06:16

## 2021-01-31 NOTE — PHYSICAL THERAPY INITIAL EVALUATION ADULT - PRECAUTIONS/LIMITATIONS, REHAB EVAL
CT A&P: Chronic compression fx of L1 vertebral body, Dextroscoliosis and multilevel moderate degenerative changes of the spine. Extensive degenerative changes at the left hip joint. Large amount of stool distending the rectum without wall thickening or adjacent changes to suggest stercoral colitis./cardiac precautions/fall precautions

## 2021-01-31 NOTE — PHARMACOTHERAPY INTERVENTION NOTE - COMMENTS
Current medications reviewed with the patient. Current medication schedule was discussed in detail including: medication name, indication, dose, administration times, treatment duration, side effects, drug interactions, and special instructions. Patient questions and concerns were answered and addressed. Patient demonstrated understanding. Informed patient that medication list may change prior to discharge. Patient provided with medication cards. Patient expressed concerns of clopidogrel causing hematuria. Explained the importance of new DAPT and relayed concerns of hematuria to NP Belinda.     Alan Thomas, PharmD  PGY-1 Pharmacy Resident  [id number]

## 2021-01-31 NOTE — DISCHARGE NOTE NURSING/CASE MANAGEMENT/SOCIAL WORK - PATIENT PORTAL LINK FT
You can access the FollowMyHealth Patient Portal offered by [facility] by registering at the following website: [email or web address]. By joining [employer]’s FollowMyHealth portal, you will also be able to view your health information using other applications (apps) compatible with our system.

## 2021-01-31 NOTE — PROGRESS NOTE ADULT - ATTENDING COMMENTS
Stable for discharge to home. To f/u with PMD within 5-7 days for further workup of hematuria, and cardiology given recent stent.    >35 minutes spent on discharge planning
D/C pending PT eval

## 2021-01-31 NOTE — PHYSICAL THERAPY INITIAL EVALUATION ADULT - ADDITIONAL COMMENTS
Pt lives in an apt w/ ramp access, was independent w/ all ADLs and functional mobility at baseline uses a rollator.

## 2021-01-31 NOTE — PROGRESS NOTE ADULT - ASSESSMENT
75F with PMH of prior TIA, HTN, HLD, OA, multiple spine compression fractures, TIA and aortic valve stenosis who presented for elective PCI, /p cardiac cath today KILEY - mLAD (80%). Course c/b by hematuria     Problem/Plan - 1:  ·  Problem: CAD (coronary artery disease).  Plan: S/p [facility] 1/29 with KILEY to LAD  Continue with asa 81 mg daily  Continue with Plavix 75mg daily. Education provided.  Appreciate cardiology recs     Problem/Plan - 2:  ·  Problem: HTN (hypertension).  Plan: BP at goal   Continue with ramipril 1.25mg daily   Continue with amlodipine 5mg daily.      Problem/Plan - 3:  ·  Problem: HLD (hyperlipidemia).  Plan: Continue with lovastatin 40mg daily.      Problem/Plan - 4:  ·  Problem: Hematuria.  Plan: likely secondary to UTI, improved  Urine cx with Ecoli  S/p ceftriaxone; keflex on discharge for total course of 7 days

## 2021-01-31 NOTE — PROGRESS NOTE ADULT - SUBJECTIVE AND OBJECTIVE BOX
[facility] Division of Hospital Medicine  Nedra Ramos MD  Pager (M-F, 5K-6K): [phone]  Other Times:  [phone]    Patient is a 75y old  Female who presents with a chief complaint of CAD       SUBJECTIVE / OVERNIGHT EVENTS: No acute events. Hematuria improved.    ADDITIONAL REVIEW OF SYSTEMS: negative    MEDICATIONS  (STANDING):  amLODIPine   Tablet 5 milliGRAM(s) Oral daily  aspirin enteric coated 81 milliGRAM(s) Oral daily  clopidogrel Tablet 75 milliGRAM(s) Oral daily  gabapentin 300 milliGRAM(s) Oral three times a day  lisinopril 5 milliGRAM(s) Oral daily  oxybutynin 5 milliGRAM(s) Oral three times a day    PHYSICAL EXAM:  T(C): 36.8 (21 @ 13:46), Max: 37.2 (21 @ 04:33)  T(F): 98.3 (21 @ 13:46), Max: 98.9 (21 @ 04:33)  HR: 94 (21 @ 13:46) (80 - 94)  BP: 132/75 (21 @ 13:46) (101/60 - 144/76)  RR: 17 (21 @ 13:46) (16 - 17)  SpO2: 98% (21 @ 13:46) (98% - 99%)  Wt(kg): --    GENERAL: Well appearing elderly woman, in NAD  EYES: EOMI, conjunctiva and sclera clear  ENT: moist mucosa, no pharyngeal erythema  NECK: supple, no JVD  LUNG: Clear to auscultation bilaterally; No rales, rhonchi, wheezing, or rubs.   CVS: Regular rate and rhythm; + murmur, no rubs, or gallops  ABDOMEN: Soft, non tender, nondistended. +Bowel sounds.  EXTREMITIES:  no edema, no cyanosis  NEURO:  Alert & Oriented X3,  No focal deficits  PSYCH: Normal affect, normal mood  MUSCULOSKELETAL: FROM, no joint swelling  Skin: warm and dry, normal color    LABS/RADIOLOGY RESULTS:                          9.3    8.72  )-----------( 218      ( 2021 07:22 )             28.5       135  |  102  |  18  ----------------------------<  117<H>  3.8   |  23  |  0.59    Ca    8.8      2021 07:10    TPro  6.1  /  Alb  3.8  /  TBili  0.6  /  DBili  0.1  /  AST  13  /  ALT  13  /  AlkPhos  56    PT/INR - ( 2021 19:47 )   PT: 12.0 sec;   INR: 1.00 ratio        Urine Culture    @ 19:20    --       Results  >100,000 CFU/ml Escherichia coli    Organism--    Organism ID--  Urinalysis Basic - ( 2021 16:22 )    Color: Red / Appearance: Turbid / S.035 / pH:   Gluc:  / Ketone: Negative  / Bili: Negative / Urobili: Negative   Blood:  / Protein: 300 mg/dL / Nitrite: Negative   Leuk Esterase: Negative / RBC: >50 /hpf / WBC 1 /HPF   Sq Epi:  / Non Sq Epi: 1 /hpf / Bacteria: Moderate

## 2021-01-31 NOTE — PHYSICAL THERAPY INITIAL EVALUATION ADULT - PERTINENT HX OF CURRENT PROBLEM, REHAB EVAL
76 y/o F w/ PMH of prior TIA, HTN, HLD, OA, multiple spine compression fractures, TIA and aortic valve stenosis who presented for elective PCI, s/p cardiac cath 1/29: KILEY - mLAD (80%). Course c/b by hematuria in setting of ?UTI. 1/31 H/H 9.3/28.5.

## 2021-02-02 PROBLEM — E78.5 HYPERLIPIDEMIA, UNSPECIFIED: Chronic | Status: ACTIVE | Noted: 2021-01-29

## 2021-02-02 PROBLEM — I10 ESSENTIAL (PRIMARY) HYPERTENSION: Chronic | Status: ACTIVE | Noted: 2021-01-29

## 2021-02-02 PROBLEM — I25.10 ATHEROSCLEROTIC HEART DISEASE OF NATIVE CORONARY ARTERY WITHOUT ANGINA PECTORIS: Chronic | Status: ACTIVE | Noted: 2021-01-29

## 2021-02-02 PROBLEM — M19.90 UNSPECIFIED OSTEOARTHRITIS, UNSPECIFIED SITE: Chronic | Status: ACTIVE | Noted: 2021-01-29

## 2021-02-03 LAB — NON-GYNECOLOGICAL CYTOLOGY STUDY: SIGNIFICANT CHANGE UP

## 2021-03-04 NOTE — PHYSICAL THERAPY INITIAL EVALUATION ADULT - GROSSLY INTACT, SENSORY
Medical Release   Hearing Aid Evaluation Form                Date: March 4, 2021      Patient: Ed Posadas   YOB: 1948   Patient Phone: [phone]        To Whom It May Concern:        My above named patient has no medical contraindications to prevent hearing aid use. I give my patient medical clearance and recommend that my patient undergo a Hearing Evaluation and/or a Hearing Aid Evaluation and to be fitted with hearing aid(s).     Otologic Diagnosis:  1. Sensorineural hearing loss (SNHL) of both ears    2. Tinnitus of both ears    Medical objection to fitting an earmold to RIGHT ear?  No  Medical objection to fitting an earmold to LEFT ear? No                 Yusuf Jc PA-C     [address] [facility] [address]   Phone: [phone]  Fax: [phone]     Grossly Intact

## 2021-03-08 ENCOUNTER — OUTPATIENT (OUTPATIENT)
Dept: OUTPATIENT SERVICES | Facility: HOSPITAL | Age: 76
LOS: 1 days | End: 2021-03-08
Payer: COMMERCIAL

## 2021-03-08 ENCOUNTER — RESULT REVIEW (OUTPATIENT)
Age: 76
End: 2021-03-08

## 2021-03-08 ENCOUNTER — APPOINTMENT (OUTPATIENT)
Dept: ULTRASOUND IMAGING | Facility: HOSPITAL | Age: 76
End: 2021-03-08

## 2021-03-08 VITALS
SYSTOLIC BLOOD PRESSURE: 135 MMHG | TEMPERATURE: 98 F | HEART RATE: 75 BPM | WEIGHT: 143.96 LBS | HEIGHT: 65 IN | OXYGEN SATURATION: 99 % | RESPIRATION RATE: 17 BRPM | DIASTOLIC BLOOD PRESSURE: 71 MMHG

## 2021-03-08 DIAGNOSIS — Z29.9 ENCOUNTER FOR PROPHYLACTIC MEASURES, UNSPECIFIED: ICD-10-CM

## 2021-03-08 DIAGNOSIS — Z11.52 ENCOUNTER FOR SCREENING FOR COVID-19: ICD-10-CM

## 2021-03-08 DIAGNOSIS — Z95.5 PRESENCE OF CORONARY ANGIOPLASTY IMPLANT AND GRAFT: Chronic | ICD-10-CM

## 2021-03-08 DIAGNOSIS — Z98.49 CATARACT EXTRACTION STATUS, UNSPECIFIED EYE: Chronic | ICD-10-CM

## 2021-03-08 DIAGNOSIS — I35.0 NONRHEUMATIC AORTIC (VALVE) STENOSIS: ICD-10-CM
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
## 2021-03-08 DIAGNOSIS — Z98.890 OTHER SPECIFIED POSTPROCEDURAL STATES: Chronic | ICD-10-CM

## 2021-03-08 LAB
ANION GAP SERPL CALC-SCNC: 14 MMOL/L — SIGNIFICANT CHANGE UP (ref 5–17)
BLD GP AB SCN SERPL QL: NEGATIVE — SIGNIFICANT CHANGE UP
BUN SERPL-MCNC: 23 MG/DL — SIGNIFICANT CHANGE UP (ref 7–23)
CALCIUM SERPL-MCNC: 10.4 MG/DL — SIGNIFICANT CHANGE UP (ref 8.4–10.5)
CHLORIDE SERPL-SCNC: 102 MMOL/L — SIGNIFICANT CHANGE UP (ref 96–108)
CO2 SERPL-SCNC: 23 MMOL/L — SIGNIFICANT CHANGE UP (ref 22–31)
CREAT SERPL-MCNC: 0.55 MG/DL — SIGNIFICANT CHANGE UP (ref 0.5–1.3)
GLUCOSE SERPL-MCNC: 77 MG/DL — SIGNIFICANT CHANGE UP (ref 70–99)
HCT VFR BLD CALC: 35.1 % — SIGNIFICANT CHANGE UP (ref 34.5–45)
HGB BLD-MCNC: 11.1 G/DL — LOW (ref 11.5–15.5)
MCHC RBC-ENTMCNC: 29.3 PG — SIGNIFICANT CHANGE UP (ref 27–34)
MCHC RBC-ENTMCNC: 31.6 GM/DL — LOW (ref 32–36)
MCV RBC AUTO: 92.6 FL — SIGNIFICANT CHANGE UP (ref 80–100)
NRBC # BLD: 0 /100 WBCS — SIGNIFICANT CHANGE UP (ref 0–0)
PLATELET # BLD AUTO: 265 K/UL — SIGNIFICANT CHANGE UP (ref 150–400)
POTASSIUM SERPL-MCNC: 3.8 MMOL/L — SIGNIFICANT CHANGE UP (ref 3.5–5.3)
POTASSIUM SERPL-SCNC: 3.8 MMOL/L — SIGNIFICANT CHANGE UP (ref 3.5–5.3)
RBC # BLD: 3.79 M/UL — LOW (ref 3.8–5.2)
RBC # FLD: 12.9 % — SIGNIFICANT CHANGE UP (ref 10.3–14.5)
RH IG SCN BLD-IMP: POSITIVE — SIGNIFICANT CHANGE UP
SARS-COV-2 RNA SPEC QL NAA+PROBE: SIGNIFICANT CHANGE UP
SODIUM SERPL-SCNC: 139 MMOL/L — SIGNIFICANT CHANGE UP (ref 135–145)
WBC # BLD: 4.4 K/UL — SIGNIFICANT CHANGE UP (ref 3.8–10.5)
WBC # FLD AUTO: 4.4 K/UL — SIGNIFICANT CHANGE UP (ref 3.8–10.5)

## 2021-03-08 PROCEDURE — 86923 COMPATIBILITY TEST ELECTRIC: CPT

## 2021-03-08 PROCEDURE — C9803: CPT

## 2021-03-08 PROCEDURE — 86901 BLOOD TYPING SEROLOGIC RH(D): CPT

## 2021-03-08 PROCEDURE — G0463: CPT

## 2021-03-08 PROCEDURE — 93880 EXTRACRANIAL BILAT STUDY: CPT

## 2021-03-08 PROCEDURE — 80048 BASIC METABOLIC PNL TOTAL CA: CPT

## 2021-03-08 PROCEDURE — 86850 RBC ANTIBODY SCREEN: CPT

## 2021-03-08 PROCEDURE — 87640 STAPH A DNA AMP PROBE: CPT

## 2021-03-08 PROCEDURE — U0003: CPT

## 2021-03-08 PROCEDURE — 87641 MR-STAPH DNA AMP PROBE: CPT

## 2021-03-08 PROCEDURE — 85027 COMPLETE CBC AUTOMATED: CPT

## 2021-03-08 PROCEDURE — 83036 HEMOGLOBIN GLYCOSYLATED A1C: CPT

## 2021-03-08 PROCEDURE — 93880 EXTRACRANIAL BILAT STUDY: CPT | Mod: 26

## 2021-03-08 PROCEDURE — U0005: CPT

## 2021-03-08 PROCEDURE — 86900 BLOOD TYPING SEROLOGIC ABO: CPT

## 2021-03-08 RX ORDER — CHLORHEXIDINE GLUCONATE 213 G/1000ML
1 SOLUTION TOPICAL ONCE
Refills: 0 | Status: COMPLETED | OUTPATIENT
Start: 2021-03-10 | End: 2021-03-10

## 2021-03-08 RX ORDER — RAMIPRIL 5 MG
1 CAPSULE ORAL
Qty: 0 | Refills: 0 | DISCHARGE

## 2021-03-08 RX ORDER — GABAPENTIN 400 MG/1
1 CAPSULE ORAL
Qty: 0 | Refills: 0 | DISCHARGE

## 2021-03-08 RX ORDER — LIDOCAINE HCL 20 MG/ML
0.2 VIAL (ML) INJECTION ONCE
Refills: 0 | Status: COMPLETED | OUTPATIENT
Start: 2021-03-10 | End: 2021-03-10

## 2021-03-08 RX ORDER — LOVASTATIN 20 MG
1 TABLET ORAL
Qty: 0 | Refills: 0 | DISCHARGE

## 2021-03-08 RX ORDER — SODIUM CHLORIDE 9 MG/ML
3 INJECTION INTRAMUSCULAR; INTRAVENOUS; SUBCUTANEOUS EVERY 8 HOURS
Refills: 0 | Status: DISCONTINUED | OUTPATIENT
Start: 2021-03-10 | End: 2021-03-15

## 2021-03-08 NOTE — H&P PST ADULT - HISTORY OF PRESENT ILLNESS
76 y/o female with history of aortic stenosis presents today for presurgical evaluation.  PMHx includes  76 y/o female with history of aortic stenosis presents today for presurgical evaluation.  PMHx includes HTN, HLD, CAD s/p cardiac cath with KILEY placement on 1/29/21, lumbar spine compression fracture, neuropathy, B/L lower extremity weakness.  She complains of dyspnea with exertion and fatigue, but denies chest pain, palpitations, cough, wheezing and recent illness.  She was referred by her cardiologist for evaluation of worsening aortic stenosis and is scheduled for TAVR on 3/10/21.    Preop Covid swab was done today 3/8/21 at [facility] testing site.

## 2021-03-08 NOTE — H&P PST ADULT - NSICDXPASTMEDICALHX_GEN_ALL_CORE_FT
PAST MEDICAL HISTORY:  Aortic stenosis     CAD (coronary artery disease)     Compression fracture of spine 2015 lumbar    Compression fracture of spine     Falls     HLD (hyperlipidemia)     Hypertension     Neuropathy     OA (osteoarthritis)     OAB (overactive bladder)     Transient ischemic attack (TIA) Jan 2014- with residual weakness on right leg    Type 2 diabetes mellitus diet controlled

## 2021-03-08 NOTE — H&P PST ADULT - ASSESSMENT
BINHI VTE 2.0 SCORE [CLOT updated 2019]    AGE RELATED RISK FACTORS                                                       MOBILITY RELATED FACTORS  [ ] Age 41-60 years                                            (1 Point)                    [ ] Bed rest                                                        (1 Point)  [ ] Age: 61-74 years                                           (2 Points)                  [ ] Plaster cast                                                   (2 Points)  [x ] Age= 75 years                                              (3 Points)                    [ ] Bed bound for more than 72 hours                 (2 Points)    DISEASE RELATED RISK FACTORS                                               GENDER SPECIFIC FACTORS  [ ] Edema in the lower extremities                       (1 Point)              [ ] Pregnancy                                                     (1 Point)  [ ] Varicose veins                                               (1 Point)                     [ ] Post-partum < 6 weeks                                   (1 Point)             [ ] BMI > 25 Kg/m2                                            (1 Point)                     [ ] Hormonal therapy  or oral contraception          (1 Point)                 [ ] Sepsis (in the previous month)                        (1 Point)               [ ] History of pregnancy complications                 (1 point)  [ ] Pneumonia or serious lung disease                                               [ ] Unexplained or recurrent                     (1 Point)           (in the previous month)                               (1 Point)  [ ] Abnormal pulmonary function test                     (1 Point)                 SURGERY RELATED RISK FACTORS  [ ] Acute myocardial infarction                              (1 Point)               [ ]  Section                                             (1 Point)  [ ] Congestive heart failure (in the previous month)  (1 Point)      [ ] Minor surgery                                                  (1 Point)   [ ] Inflammatory bowel disease                             (1 Point)               [ ] Arthroscopic surgery                                        (2 Points)  [ ] Central venous access                                      (2 Points)                [x ] General surgery lasting more than 45 minutes (2 points)  [ ] Malignancy- Present or previous                   (2 Points)                [ ] Elective arthroplasty                                         (5 points)    [ ] Stroke (in the previous month)                          (5 Points)                                                                                                                                                           HEMATOLOGY RELATED FACTORS                                                 TRAUMA RELATED RISK FACTORS  [ ] Prior episodes of VTE                                     (3 Points)                [ ] Fracture of the hip, pelvis, or leg                       (5 Points)  [ ] Positive family history for VTE                         (3 Points)             [ ] Acute spinal cord injury (in the previous month)  (5 Points)  [ ] Prothrombin 95519 A                                     (3 Points)               [ ] Paralysis  (less than 1 month)                             (5 Points)  [ ] Factor V Leiden                                             (3 Points)                  [ ] Multiple Trauma within 1 month                        (5 Points)  [ ] Lupus anticoagulants                                     (3 Points)                                                           [ ] Anticardiolipin antibodies                               (3 Points)                                                       [ ] High homocysteine in the blood                      (3 Points)                                             [ ] Other congenital or acquired thrombophilia      (3 Points)                                                [ ] Heparin induced thrombocytopenia                  (3 Points)                                     Total Score [   5       ] BINHI VTE 2.0 SCORE [CLOT updated 2019]    AGE RELATED RISK FACTORS                                                       MOBILITY RELATED FACTORS  [ ] Age 41-60 years                                            (1 Point)                    [ ] Bed rest                                                        (1 Point)  [ ] Age: 61-74 years                                           (2 Points)                  [ ] Plaster cast                                                   (2 Points)  [x ] Age= 75 years                                              (3 Points)                    [ ] Bed bound for more than 72 hours                 (2 Points)    DISEASE RELATED RISK FACTORS                                               GENDER SPECIFIC FACTORS  [x ] Edema in the lower extremities                       (1 Point)              [ ] Pregnancy                                                     (1 Point)  [ ] Varicose veins                                               (1 Point)                     [ ] Post-partum < 6 weeks                                   (1 Point)             [ ] BMI > 25 Kg/m2                                            (1 Point)                     [ ] Hormonal therapy  or oral contraception          (1 Point)                 [ ] Sepsis (in the previous month)                        (1 Point)               [ ] History of pregnancy complications                 (1 point)  [ ] Pneumonia or serious lung disease                                               [ ] Unexplained or recurrent                     (1 Point)           (in the previous month)                               (1 Point)  [ ] Abnormal pulmonary function test                     (1 Point)                 SURGERY RELATED RISK FACTORS  [ ] Acute myocardial infarction                              (1 Point)               [ ]  Section                                             (1 Point)  [ ] Congestive heart failure (in the previous month)  (1 Point)      [ ] Minor surgery                                                  (1 Point)   [ ] Inflammatory bowel disease                             (1 Point)               [ ] Arthroscopic surgery                                        (2 Points)  [ ] Central venous access                                      (2 Points)                [x ] General surgery lasting more than 45 minutes (2 points)  [ ] Malignancy- Present or previous                   (2 Points)                [ ] Elective arthroplasty                                         (5 points)    [ ] Stroke (in the previous month)                          (5 Points)                                                                                                                                                           HEMATOLOGY RELATED FACTORS                                                 TRAUMA RELATED RISK FACTORS  [ ] Prior episodes of VTE                                     (3 Points)                [ ] Fracture of the hip, pelvis, or leg                       (5 Points)  [ ] Positive family history for VTE                         (3 Points)             [ ] Acute spinal cord injury (in the previous month)  (5 Points)  [ ] Prothrombin 06696 A                                     (3 Points)               [ ] Paralysis  (less than 1 month)                             (5 Points)  [ ] Factor V Leiden                                             (3 Points)                  [ ] Multiple Trauma within 1 month                        (5 Points)  [ ] Lupus anticoagulants                                     (3 Points)                                                           [ ] Anticardiolipin antibodies                               (3 Points)                                                       [ ] High homocysteine in the blood                      (3 Points)                                             [ ] Other congenital or acquired thrombophilia      (3 Points)                                                [ ] Heparin induced thrombocytopenia                  (3 Points)                                     Total Score [   6      ]

## 2021-03-08 NOTE — H&P PST ADULT - NSICDXPASTSURGICALHX_GEN_ALL_CORE_FT
PAST SURGICAL HISTORY:  H/O oral surgery     History of cataract surgery     History of coronary artery stent placement KILEY to mLAD (Jan 2021)

## 2021-03-08 NOTE — H&P PST ADULT - ADMIT DATE
[Dear  ___] : Dear  [unfilled], [Consult Letter:] : I had the pleasure of evaluating your patient, [unfilled]. 08-Mar-2021 [Please see my note below.] : Please see my note below. [Consult Closing:] : Thank you very much for allowing me to participate in the care of this patient.  If you have any questions, please do not hesitate to contact me. [Sincerely,] : Sincerely, [FreeTextEntry3] : Bradly Lawson III, MD\par [facility]/elodia

## 2021-03-08 NOTE — H&P PST ADULT - NSICDXPROBLEM_GEN_ALL_CORE_FT
PROBLEM DIAGNOSES  Problem: Aortic stenosis  Assessment and Plan: Pt. is scheduled for TAVR on 3/10/21.  Preop instructions reviewed, pt verbalized understanding.  Preop labs drawn today. (CBC, BMP, HgbA1C, T&S, MRSA)   Preop Covid swab done today at [facility].  Pt. sent for Carotid dopplers. (CXR not needed as she had CT Scan done, as per Shauna in [facility]),  Pt. instructed to continue ASA/Plavix prior to surgery (confirmed with Shauna in [facility]).      Problem: Need for prophylactic measure  Assessment and Plan: The Caprini score indicates this patient is at risk for a VTE event (score 3-5).  Most surgical patients in this group would benefit from pharmacologic prophylaxis.  The surgical team will determine the balance between VTE risk and bleeding risk     Problem: Falls  Assessment and Plan: Fall Precautions.

## 2021-03-08 NOTE — H&P PST ADULT - NSANTHOSAYNRD_GEN_A_CORE
No. ZENY screening performed.  STOP BANG Legend: 0-2 = LOW Risk; 3-4 = INTERMEDIATE Risk; 5-8 = HIGH Risk

## 2021-03-08 NOTE — H&P PST ADULT - NSICDXFAMILYHX_GEN_ALL_CORE_FT
FAMILY HISTORY:  FH: heart disease, father, grandfather  FH: stroke, mother  FH: type 2 diabetes, grandfather

## 2021-03-09 ENCOUNTER — TRANSCRIPTION ENCOUNTER (OUTPATIENT)
Age: 76
End: 2021-03-09

## 2021-03-09 DIAGNOSIS — Z22.321 CARRIER OR SUSPECTED CARRIER OF METHICILLIN SUSCEPTIBLE STAPHYLOCOCCUS AUREUS: ICD-10-CM
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
## 2021-03-09 PROBLEM — G62.9 POLYNEUROPATHY, UNSPECIFIED: Chronic | Status: ACTIVE | Noted: 2021-03-08

## 2021-03-09 PROBLEM — G45.9 TRANSIENT CEREBRAL ISCHEMIC ATTACK, UNSPECIFIED: Chronic | Status: ACTIVE | Noted: 2021-03-08

## 2021-03-09 PROBLEM — I35.0 NONRHEUMATIC AORTIC (VALVE) STENOSIS: Chronic | Status: ACTIVE | Noted: 2021-03-08

## 2021-03-09 PROBLEM — M48.50XA COLLAPSED VERTEBRA, NOT ELSEWHERE CLASSIFIED, SITE UNSPECIFIED, INITIAL ENCOUNTER FOR FRACTURE: Chronic | Status: ACTIVE | Noted: 2021-01-29

## 2021-03-09 PROBLEM — E11.9 TYPE 2 DIABETES MELLITUS WITHOUT COMPLICATIONS: Chronic | Status: ACTIVE | Noted: 2021-03-08

## 2021-03-09 PROBLEM — M48.50XA COLLAPSED VERTEBRA, NOT ELSEWHERE CLASSIFIED, SITE UNSPECIFIED, INITIAL ENCOUNTER FOR FRACTURE: Chronic | Status: ACTIVE | Noted: 2021-03-08

## 2021-03-09 PROBLEM — N32.81 OVERACTIVE BLADDER: Chronic | Status: ACTIVE | Noted: 2021-03-08

## 2021-03-09 LAB
A1C WITH ESTIMATED AVERAGE GLUCOSE RESULT: 5.3 % — SIGNIFICANT CHANGE UP (ref 4–5.6)
ESTIMATED AVERAGE GLUCOSE: 105 MG/DL — SIGNIFICANT CHANGE UP (ref 68–114)
MRSA PCR RESULT.: SIGNIFICANT CHANGE UP
S AUREUS DNA NOSE QL NAA+PROBE: DETECTED

## 2021-03-09 RX ORDER — MUPIROCIN 20 MG/G
2 OINTMENT TOPICAL
Qty: 1 | Refills: 0 | Status: ACTIVE | COMMUNITY
Start: 2021-03-09 | End: 1900-01-01

## 2021-03-10 ENCOUNTER — APPOINTMENT (OUTPATIENT)
Dept: CARDIOTHORACIC SURGERY | Facility: HOSPITAL | Age: 76
End: 2021-03-10

## 2021-03-10 ENCOUNTER — INPATIENT (INPATIENT)
Facility: HOSPITAL | Age: 76
LOS: 4 days | Discharge: NURSING, REHAB OR LONG-TERM CARE | DRG: 266 | End: 2021-03-15
Attending: THORACIC SURGERY (CARDIOTHORACIC VASCULAR SURGERY) | Admitting: THORACIC SURGERY (CARDIOTHORACIC VASCULAR SURGERY)
Payer: COMMERCIAL

## 2021-03-10 VITALS
OXYGEN SATURATION: 99 % | RESPIRATION RATE: 16 BRPM | WEIGHT: 144.62 LBS | HEART RATE: 79 BPM | HEIGHT: 65 IN | SYSTOLIC BLOOD PRESSURE: 121 MMHG | DIASTOLIC BLOOD PRESSURE: 76 MMHG | TEMPERATURE: 98 F

## 2021-03-10 DIAGNOSIS — I35.0 NONRHEUMATIC AORTIC (VALVE) STENOSIS: ICD-10-CM

## 2021-03-10 DIAGNOSIS — Z98.49 CATARACT EXTRACTION STATUS, UNSPECIFIED EYE: Chronic | ICD-10-CM

## 2021-03-10 DIAGNOSIS — Z95.5 PRESENCE OF CORONARY ANGIOPLASTY IMPLANT AND GRAFT: Chronic | ICD-10-CM

## 2021-03-10 DIAGNOSIS — Z98.890 OTHER SPECIFIED POSTPROCEDURAL STATES: Chronic | ICD-10-CM

## 2021-03-10 LAB
ALBUMIN SERPL ELPH-MCNC: 3.1 G/DL — LOW (ref 3.3–5)
ALP SERPL-CCNC: 49 U/L — SIGNIFICANT CHANGE UP (ref 40–120)
ALT FLD-CCNC: 8 U/L — LOW (ref 10–45)
ANION GAP SERPL CALC-SCNC: 15 MMOL/L — SIGNIFICANT CHANGE UP (ref 5–17)
AST SERPL-CCNC: 13 U/L — SIGNIFICANT CHANGE UP (ref 10–40)
BASE EXCESS BLDV CALC-SCNC: -1.9 MMOL/L — SIGNIFICANT CHANGE UP (ref -2–2)
BASOPHILS # BLD AUTO: 0.04 K/UL — SIGNIFICANT CHANGE UP (ref 0–0.2)
BASOPHILS NFR BLD AUTO: 0.7 % — SIGNIFICANT CHANGE UP (ref 0–2)
BILIRUB SERPL-MCNC: 0.4 MG/DL — SIGNIFICANT CHANGE UP (ref 0.2–1.2)
BLD GP AB SCN SERPL QL: NEGATIVE — SIGNIFICANT CHANGE UP
BUN SERPL-MCNC: 15 MG/DL — SIGNIFICANT CHANGE UP (ref 7–23)
CA-I SERPL-SCNC: 1.07 MMOL/L — LOW (ref 1.12–1.3)
CALCIUM SERPL-MCNC: 8.3 MG/DL — LOW (ref 8.4–10.5)
CHLORIDE BLDV-SCNC: 105 MMOL/L — SIGNIFICANT CHANGE UP (ref 96–108)
CHLORIDE SERPL-SCNC: 101 MMOL/L — SIGNIFICANT CHANGE UP (ref 96–108)
CO2 BLDV-SCNC: 25 MMOL/L — SIGNIFICANT CHANGE UP (ref 22–30)
CO2 SERPL-SCNC: 21 MMOL/L — LOW (ref 22–31)
CREAT SERPL-MCNC: 0.56 MG/DL — SIGNIFICANT CHANGE UP (ref 0.5–1.3)
EOSINOPHIL # BLD AUTO: 0.19 K/UL — SIGNIFICANT CHANGE UP (ref 0–0.5)
EOSINOPHIL NFR BLD AUTO: 3.2 % — SIGNIFICANT CHANGE UP (ref 0–6)
GAS PNL BLDV: 135 MMOL/L — SIGNIFICANT CHANGE UP (ref 135–145)
GAS PNL BLDV: SIGNIFICANT CHANGE UP
GAS PNL BLDV: SIGNIFICANT CHANGE UP
GLUCOSE BLDC GLUCOMTR-MCNC: 68 MG/DL — LOW (ref 70–99)
GLUCOSE BLDC GLUCOMTR-MCNC: 95 MG/DL — SIGNIFICANT CHANGE UP (ref 70–99)
GLUCOSE BLDV-MCNC: 133 MG/DL — HIGH (ref 70–99)
GLUCOSE SERPL-MCNC: 139 MG/DL — HIGH (ref 70–99)
HCO3 BLDV-SCNC: 23 MMOL/L — SIGNIFICANT CHANGE UP (ref 21–29)
HCT VFR BLD CALC: 29.4 % — LOW (ref 34.5–45)
HCT VFR BLDA CALC: 30 % — LOW (ref 39–50)
HGB BLD CALC-MCNC: 9.7 G/DL — LOW (ref 11.5–15.5)
HGB BLD-MCNC: 9.5 G/DL — LOW (ref 11.5–15.5)
HOROWITZ INDEX BLDV+IHG-RTO: 21 — SIGNIFICANT CHANGE UP
IMM GRANULOCYTES NFR BLD AUTO: 0.8 % — SIGNIFICANT CHANGE UP (ref 0–1.5)
LACTATE BLDV-MCNC: 1.3 MMOL/L — SIGNIFICANT CHANGE UP (ref 0.7–2)
LYMPHOCYTES # BLD AUTO: 1.88 K/UL — SIGNIFICANT CHANGE UP (ref 1–3.3)
LYMPHOCYTES # BLD AUTO: 31.3 % — SIGNIFICANT CHANGE UP (ref 13–44)
MAGNESIUM SERPL-MCNC: 1.9 MG/DL — SIGNIFICANT CHANGE UP (ref 1.6–2.6)
MCHC RBC-ENTMCNC: 30.1 PG — SIGNIFICANT CHANGE UP (ref 27–34)
MCHC RBC-ENTMCNC: 32.3 GM/DL — SIGNIFICANT CHANGE UP (ref 32–36)
MCV RBC AUTO: 93 FL — SIGNIFICANT CHANGE UP (ref 80–100)
MONOCYTES # BLD AUTO: 0.47 K/UL — SIGNIFICANT CHANGE UP (ref 0–0.9)
MONOCYTES NFR BLD AUTO: 7.8 % — SIGNIFICANT CHANGE UP (ref 2–14)
NEUTROPHILS # BLD AUTO: 3.38 K/UL — SIGNIFICANT CHANGE UP (ref 1.8–7.4)
NEUTROPHILS NFR BLD AUTO: 56.2 % — SIGNIFICANT CHANGE UP (ref 43–77)
NRBC # BLD: 0 /100 WBCS — SIGNIFICANT CHANGE UP (ref 0–0)
OTHER CELLS CSF MANUAL: 8 ML/DL — LOW (ref 18–22)
PCO2 BLDV: 44 MMHG — SIGNIFICANT CHANGE UP (ref 35–50)
PH BLDV: 7.34 — LOW (ref 7.35–7.45)
PHOSPHATE SERPL-MCNC: 3 MG/DL — SIGNIFICANT CHANGE UP (ref 2.5–4.5)
PLATELET # BLD AUTO: 189 K/UL — SIGNIFICANT CHANGE UP (ref 150–400)
PO2 BLDV: 36 MMHG — SIGNIFICANT CHANGE UP (ref 25–45)
POTASSIUM BLDV-SCNC: 3.2 MMOL/L — LOW (ref 3.5–5.3)
POTASSIUM SERPL-MCNC: 3.4 MMOL/L — LOW (ref 3.5–5.3)
POTASSIUM SERPL-SCNC: 3.4 MMOL/L — LOW (ref 3.5–5.3)
PROT SERPL-MCNC: 5.1 G/DL — LOW (ref 6–8.3)
RBC # BLD: 3.16 M/UL — LOW (ref 3.8–5.2)
RBC # FLD: 12.7 % — SIGNIFICANT CHANGE UP (ref 10.3–14.5)
RH IG SCN BLD-IMP: POSITIVE — SIGNIFICANT CHANGE UP
SAO2 % BLDV: 60 % — LOW (ref 67–88)
SODIUM SERPL-SCNC: 137 MMOL/L — SIGNIFICANT CHANGE UP (ref 135–145)
WBC # BLD: 6.01 K/UL — SIGNIFICANT CHANGE UP (ref 3.8–10.5)
WBC # FLD AUTO: 6.01 K/UL — SIGNIFICANT CHANGE UP (ref 3.8–10.5)

## 2021-03-10 PROCEDURE — 36620 INSERTION CATHETER ARTERY: CPT

## 2021-03-10 PROCEDURE — 93010 ELECTROCARDIOGRAM REPORT: CPT

## 2021-03-10 PROCEDURE — 33361 REPLACE AORTIC VALVE PERQ: CPT | Mod: 62,Q0

## 2021-03-10 PROCEDURE — 99291 CRITICAL CARE FIRST HOUR: CPT | Mod: 25

## 2021-03-10 PROCEDURE — 71045 X-RAY EXAM CHEST 1 VIEW: CPT | Mod: 26

## 2021-03-10 PROCEDURE — 93306 TTE W/DOPPLER COMPLETE: CPT | Mod: 26

## 2021-03-10 PROCEDURE — 99292 CRITICAL CARE ADDL 30 MIN: CPT | Mod: 25

## 2021-03-10 RX ORDER — ASPIRIN/CALCIUM CARB/MAGNESIUM 324 MG
81 TABLET ORAL DAILY
Refills: 0 | Status: DISCONTINUED | OUTPATIENT
Start: 2021-03-10 | End: 2021-03-10

## 2021-03-10 RX ORDER — SODIUM CHLORIDE 9 MG/ML
1000 INJECTION INTRAMUSCULAR; INTRAVENOUS; SUBCUTANEOUS
Refills: 0 | Status: DISCONTINUED | OUTPATIENT
Start: 2021-03-10 | End: 2021-03-11

## 2021-03-10 RX ORDER — CEFAZOLIN SODIUM 1 G
2000 VIAL (EA) INJECTION EVERY 8 HOURS
Refills: 0 | Status: COMPLETED | OUTPATIENT
Start: 2021-03-10 | End: 2021-03-11

## 2021-03-10 RX ORDER — ATORVASTATIN CALCIUM 80 MG/1
10 TABLET, FILM COATED ORAL AT BEDTIME
Refills: 0 | Status: DISCONTINUED | OUTPATIENT
Start: 2021-03-10 | End: 2021-03-15

## 2021-03-10 RX ORDER — CEFUROXIME AXETIL 250 MG
1500 TABLET ORAL ONCE
Refills: 0 | Status: COMPLETED | OUTPATIENT
Start: 2021-03-10 | End: 2021-03-10

## 2021-03-10 RX ORDER — POTASSIUM CHLORIDE 20 MEQ
20 PACKET (EA) ORAL
Refills: 0 | Status: COMPLETED | OUTPATIENT
Start: 2021-03-10 | End: 2021-03-11

## 2021-03-10 RX ORDER — BENZOCAINE AND MENTHOL 5; 1 G/100ML; G/100ML
1 LIQUID ORAL
Refills: 0 | Status: DISCONTINUED | OUTPATIENT
Start: 2021-03-10 | End: 2021-03-15

## 2021-03-10 RX ORDER — NOREPINEPHRINE BITARTRATE/D5W 8 MG/250ML
0.07 PLASTIC BAG, INJECTION (ML) INTRAVENOUS
Qty: 8 | Refills: 0 | Status: DISCONTINUED | OUTPATIENT
Start: 2021-03-10 | End: 2021-03-11

## 2021-03-10 RX ORDER — MAGNESIUM SULFATE 500 MG/ML
1 VIAL (ML) INJECTION ONCE
Refills: 0 | Status: COMPLETED | OUTPATIENT
Start: 2021-03-10 | End: 2021-03-10

## 2021-03-10 RX ORDER — VANCOMYCIN HCL 1 G
1000 VIAL (EA) INTRAVENOUS ONCE
Refills: 0 | Status: COMPLETED | OUTPATIENT
Start: 2021-03-11 | End: 2021-03-11

## 2021-03-10 RX ORDER — ASPIRIN/CALCIUM CARB/MAGNESIUM 324 MG
81 TABLET ORAL DAILY
Refills: 0 | Status: DISCONTINUED | OUTPATIENT
Start: 2021-03-11 | End: 2021-03-15

## 2021-03-10 RX ORDER — GABAPENTIN 400 MG/1
300 CAPSULE ORAL THREE TIMES A DAY
Refills: 0 | Status: DISCONTINUED | OUTPATIENT
Start: 2021-03-10 | End: 2021-03-13

## 2021-03-10 RX ORDER — POTASSIUM CHLORIDE 20 MEQ
20 PACKET (EA) ORAL
Refills: 0 | Status: DISCONTINUED | OUTPATIENT
Start: 2021-03-10 | End: 2021-03-10

## 2021-03-10 RX ORDER — CLOPIDOGREL BISULFATE 75 MG/1
75 TABLET, FILM COATED ORAL DAILY
Refills: 0 | Status: DISCONTINUED | OUTPATIENT
Start: 2021-03-10 | End: 2021-03-15

## 2021-03-10 RX ORDER — ALBUMIN HUMAN 25 %
250 VIAL (ML) INTRAVENOUS
Refills: 0 | Status: COMPLETED | OUTPATIENT
Start: 2021-03-10 | End: 2021-03-10

## 2021-03-10 RX ADMIN — Medication 100 MILLIGRAM(S): at 21:26

## 2021-03-10 RX ADMIN — SODIUM CHLORIDE 3 MILLILITER(S): 9 INJECTION INTRAMUSCULAR; INTRAVENOUS; SUBCUTANEOUS at 21:30

## 2021-03-10 RX ADMIN — ATORVASTATIN CALCIUM 10 MILLIGRAM(S): 80 TABLET, FILM COATED ORAL at 21:27

## 2021-03-10 RX ADMIN — Medication 20 MILLIEQUIVALENT(S): at 21:28

## 2021-03-10 RX ADMIN — Medication 500 MILLILITER(S): at 18:26

## 2021-03-10 RX ADMIN — GABAPENTIN 300 MILLIGRAM(S): 400 CAPSULE ORAL at 21:27

## 2021-03-10 RX ADMIN — CLOPIDOGREL BISULFATE 75 MILLIGRAM(S): 75 TABLET, FILM COATED ORAL at 21:27

## 2021-03-10 RX ADMIN — SODIUM CHLORIDE 3 MILLILITER(S): 9 INJECTION INTRAMUSCULAR; INTRAVENOUS; SUBCUTANEOUS at 18:24

## 2021-03-10 RX ADMIN — Medication 100 GRAM(S): at 23:05

## 2021-03-10 RX ADMIN — Medication 8.61 MICROGRAM(S)/KG/MIN: at 18:25

## 2021-03-10 RX ADMIN — Medication 8.61 MICROGRAM(S)/KG/MIN: at 19:15

## 2021-03-10 RX ADMIN — Medication 20 MILLIEQUIVALENT(S): at 23:05

## 2021-03-10 RX ADMIN — SODIUM CHLORIDE 10 MILLILITER(S): 9 INJECTION INTRAMUSCULAR; INTRAVENOUS; SUBCUTANEOUS at 22:18

## 2021-03-10 RX ADMIN — Medication 0.2 MILLILITER(S): at 18:24

## 2021-03-10 RX ADMIN — Medication 500 MILLILITER(S): at 18:25

## 2021-03-10 RX ADMIN — BENZOCAINE AND MENTHOL 1 LOZENGE: 5; 1 LIQUID ORAL at 22:33

## 2021-03-10 NOTE — H&P ADULT - NSHPPHYSICALEXAM_GEN_ALL_CORE
PHYSICAL EXAM:  GENERAL: No acute distress, well-developed  HEAD:  Atraumatic, Normocephalic  EYES: EOMI, PERRLA, conjunctiva and sclera clear  NECK: Supple, no lymphadenopathy, no JVD  CHEST/LUNG: CTAB; No wheezes, rales, or rhonchi  HEART: Regular rate and rhythm. Normal S1/S2. No murmurs, rubs, or gallops  ABDOMEN: Soft, non-tender, non-distended; normal bowel sounds, no organomegaly  EXTREMITIES:  2+ peripheral pulses b/l, No clubbing, cyanosis, or edema  NEUROLOGY: A&O x 3, no focal deficits  SKIN: No rashes or lesions

## 2021-03-10 NOTE — PRE-ANESTHESIA EVALUATION ADULT - NSANTHPMHFT_GEN_ALL_CORE
76 yo F for TF TAVR. NPO, COVID neg, patient endorses bilateral lower extremity weakness and right upper extremity 'stiffness". Ambulates with a walker

## 2021-03-10 NOTE — PROGRESS NOTE ADULT - SUBJECTIVE AND OBJECTIVE BOX
RAFAEL NO   MRN#: [id number]     The patient is a 75y Female who was seen, evaluated, & examined with the ICU staff with a multidisciplinary care plan formulated & implemented. All available clinical, laboratory, radiographic, pharmacologic, and electrocardiographic data were reviewed & analyzed.      The patient was in the ICU in critical condition secondary to:     persistent cardiopulmonary dysfunction  vasodilatory shock  complete heart block requiring transvenous pacing    For support and evaluation & prevention of further decompensation secondary to persistent cardiopulmonary dysfunction, respiratory status required:     supplemental oxygen with nasal cannula  continuous pulse oximetry monitoring  following ABGs with A-line monitoring    Invasive hemodynamic monitoring with an A-line and a trans-venous pacemaker were required for continuous MAP/BP monitoring to ensure adequate cardiovascular support and to evaluate for & help prevent decompensation while receiving     IV Levophed infusion    secondary to     vasodilatory shock  complete heart block     In addition:  Multiple medical problems including severe AS s/p TAVR complicated by complete heart block requiring trans-venous pacing  Shock requiring Levophed infusion, likely due to loss of AV synchrony in the setting of complete heart block though could also be dry as LV cavity is small  EP consulted, may need permanent pacemaker - per EP, increase temporary pacing rate from back-up at 40 bpm to 80 bpm  Fluid challenge to wean pressor  O2 sats mid 90s on nasal cannula  NPO post procedure - advance diet as tolerated  Normal renal function, compensated on exam  H/H low but acceptable post procedure; femoral access sites do not have oozing or hematoma  Afebrile, continue Ancef for edwin-procedure antibiotics  Sugars controlled  Femoral TVP 3/10    The patient required critical care management and I personally provided 75 minutes of non-continuous care to the patient, excluding separate procedures, in addition to discussing the patient and plan at length with the ICU staff and helping coordinate care.

## 2021-03-10 NOTE — H&P ADULT - ASSESSMENT
76 y/o with hx of HTN, HLD, CAD with KILEY mLAD 1/29/21, LS compression fracture, neuropathy, B/L lower extremity weakness with severe aortic stenosis, admitted to CICU s/p TAVR Core Valve c/b CHB with left femoral TVP in place plan for PPM implantation in AM.     #neuro  - AOx3  - sleepy but easily arousable likely due to anesthesia  - will continue to monitor     #cardiovascular   AS with CHB   - s/p TAVR with core valve   - right femoral access site benign, no hematoma  - left femoral TVP in place, settings: VVI, HR initially at 30 increased to 80, Sensitivity 2, Output: 10   - EP consulted, plan for PPM in place in AM, first case with MD Jolley  - keep NPO   -  74 y/o with hx of HTN, HLD, CAD with KILEY mLAD 1/29/21, LS compression fracture, neuropathy, B/L lower extremity weakness with severe aortic stenosis, admitted to CICU s/p TAVR Core Valve c/b CHB with left femoral TVP in place plan for PPM implantation in AM.     # Neuro  - AOx3  - sleepy but easily arousable likely due to anesthesia  - will continue to monitor     # Respiratory  No oxygen requirements, satting well on room air     # Cardiovascular  AS with CHB   - s/p TAVR with core valve   - right femoral access site benign, no hematoma  - left femoral TVP in place, settings: VVI, HR initially at 30 increased to 80, Sensitivity 2, Output: 10   - EP consulted, plan for PPM in place in AM, first case with MD Jolley. Mary Jo Op vanc/ancef   - keep NPO  - monitor on tele     Hypotension  - No medications given during TAVR besides lidocaine, which is unlikely cause of hypotension    - c/w levophed, wean as tolerated   - monitor BP with a-line  - hold home antihypertensives (amlodine, ramipril)    # Hemeonc  - baseline 1/21 Hgb 12  - currently below baselin ~9-10, MCV >90, macrocytic   - monitor H/H, consider checking iron studies B12 folate     # GI  - keep NPO at this time given lethargy     # Renal  - No active interventions     # ID  - No active interventions  - Afebrile, no leukocytosis, monitor for now  - Periop abx as above    74 y/o with hx of HTN, HLD, CAD with KILEY mLAD 1/29/21, LS compression fracture, neuropathy, B/L lower extremity weakness with severe aortic stenosis, admitted to CICU s/p TAVR Core Valve c/b CHB with left femoral TVP in place plan for PPM implantation in AM.     # Neuro  - AOx3  - sleepy but easily arousable likely due to anesthesia   - will continue to monitor     # Respiratory  No oxygen requirements, satting well on room air     # Cardiovascular  AS with CHB   - s/p TAVR with core valve   - right femoral access site benign, no hematoma  - left femoral TVP in place, settings: VVI, HR initially at 30 increased to 80, Sensitivity 2, Output: 10   - EP consulted, plan for PPM in place in AM, first case with MD Jolley. Edwin Op vanc/ancef   - monitor on tele     Hypotension   - Multifactorial, most likely in s/o AV disassociation in complete heart block. Could also be 2/2 NPO status edwin op, potentially hypovolemic. No medications given during TAVR besides lidocaine, which is unlikely cause of hypotension.   - c/w levophed, wean as tolerated, fluid challenge   - monitor BP with a-line  - hold home antihypertensives (amlodine, ramipril)    # Hemeonc  - baseline 1/21 Hgb 12  - currently below baseline~9-10, MCV >90, macrocytic   - monitor H/H, consider checking iron studies B12 folate     # GI  - keep NPO at this time given lethargy     # Renal  - No active interventions     # ID  - No active interventions  - Afebrile, no leukocytosis, monitor for now  - Periop abx as above

## 2021-03-10 NOTE — CONSULT NOTE ADULT - ASSESSMENT
75F PMHx includes HTN, HLD, CAD s/p cardiac cath with KILEY mLAD placement on 1/29/21, aortic stenosis s/p TAVR Core Valve 3/10, c/b CHB now with L groin TVP. EP consulted for possible need for PPM.      Juan C Toledo MD  Cardiology Fellow  [phone]    Please check [email or web address]: [email or web address]" for cardiology service schedule and contact information.  75F PMHx includes HTN, HLD, CAD s/p cardiac cath with KILEY mLAD placement on 1/29/21, aortic stenosis s/p TAVR Core Valve 3/10, c/b CHB now with L groin TVP. EP consulted for PPM placement.    AS s/p TAVR with core valve  c/b Sinus rhythm w CHB w LBBB and junctional escape 50's  - left femoral TVP in place, settings: VVI, HR initially at 30 increased to 80, Sensitivity 2, Output: 10   -NPO after MN for PPM in AM   -Active type and screen   -Weight based vancomycin 15 mg/kg 1 hour prior to PPM procedure       Juan C Toledo MD  Cardiology Fellow  [phone]    Please check [email or web address]: [email or web address]" for cardiology service schedule and contact information.

## 2021-03-10 NOTE — BRIEF OPERATIVE NOTE - NSICDXBRIEFPROCEDURE_GEN_ALL_CORE_FT
PROCEDURES:  TAVR, percutaneous 10-Mar-2021 16:31:47 R transfemoral TAVR #26 Core [employer] [facility]

## 2021-03-10 NOTE — PROGRESS NOTE ADULT - SUBJECTIVE AND OBJECTIVE BOX
RAFAEL NO  MRN-[id number]  Patient is a 75y old  Female who presents with a chief complaint of s/p TAVR (10 Mar 2021 19:25)    HPI:  74 y/o female with history severe AS,  HTN, HLD, CAD s/p cardiac cath with KILEY placement on 1/29/21, lumbar spine compression fracture, neuropathy, B/L lower extremity weakness.  She complains of worsening dyspnea with exertion and fatigue, but denies chest pain, palpitations, cough, wheezing and recent illness.  She was referred by her cardiologist for evaluation of worsening aortic stenosis and is scheduled for TAVR today. Intraop found to be in complete heart block requiring left femoral TVP, transferred to CICU for further management. Course complicated by hypotension now on levophed.   (10 Mar 2021 17:34)      Hospital Course:  3/10 Transferred to CICU, S/P TAVR, now w/ CHB    24 HOUR EVENTS:    REVIEW OF SYSTEMS:   Constitutional: No weakness, fevers, or chills  Eyes/ENT: No visual changes  Respiratory: No cough, wheezing, hemoptysis  Cardiovascular: No chest pain, no palpitations  Gastrointestinal: No abdominal pain. No nausea, vomiting, hematemesis.   Genitourinary: No dysuria  Neurological: No numbness, no weakness  Skin: No itching, rashes    ICU Vital Signs Last 24 Hrs  T(C): 36.5 (10 Mar 2021 19:15), Max: 36.8 (10 Mar 2021 17:00)  T(F): 97.7 (10 Mar 2021 19:15), Max: 98.2 (10 Mar 2021 17:00)  HR: 82 (10 Mar 2021 20:45) (48 - 88)  BP: 125/53 (10 Mar 2021 19:30) (75/35 - 125/53)  BP(mean): 79 (10 Mar 2021 19:30) (44 - 79)  ABP: 160/46 (10 Mar 2021 20:45) (98/36 - 174/50)  ABP(mean): 78 (10 Mar 2021 20:45) (58 - 88)  RR: 16 (10 Mar 2021 20:45) (11 - 20)  SpO2: 100% (10 Mar 2021 20:45) (95% - 100%)    I&O's Summary    10 Mar 2021 07:01  -  10 Mar 2021 20:54  --------------------------------------------------------  IN: 507.6 mL / OUT: 0 mL / NET: 507.6 mL    POCT Blood Glucose.: 95 mg/dL (10 Mar 2021 12:33)      PHYSICAL EXAM:   General: No acute distress  Eyes: EOMI, PERRLA, conjunctiva and sclera clear  Chest/Lung: CTAB, no wheezes, rales, or rhonchi  Heart: Regular rate, regular rhythm. Normal S1/S2. No murmurs, rubs, or gallops.  Abdomen: Soft, nontender, nondistended. Normal bowel sounds.  Extremites: 2+ peripheral pulses B/L. No clubbing, cyanosis, or edema.  Neurology: A&O x3, no focal deficits  Skin: No rashes or lesions  ============================I/O===========================   I&O's Detail    10 Mar 2021 07:01  -  10 Mar 2021 20:54  --------------------------------------------------------  IN:    IV PiggyBack: 500 mL    Norepinephrine: 7.6 mL  Total IN: 507.6 mL    OUT:  Total OUT: 0 mL    Total NET: 507.6 mL        ============================ LABS =========================                        9.5    6.01  )-----------( 189      ( 10 Mar 2021 17:45 )             29.4     03-10    137  |  101  |  15  ----------------------------<  139<H>  3.4<L>   |  21<L>  |  0.56    Ca    8.3<L>      10 Mar 2021 17:45  Phos  3.0     03-10    TPro  5.1<L>  /  Alb  3.1<L>  /  TBili  0.4  /  DBili  x   /  AST  13  /  ALT  8<L>  /  AlkPhos  49  03-10      LIVER FUNCTIONS - ( 10 Mar 2021 17:45 )  Alb: 3.1 g/dL / Pro: 5.1 g/dL / ALK PHOS: 49 U/L / ALT: 8 U/L / AST: 13 U/L / GGT: x           Blood Gas Venous - Lactate: 1.3 mmoL/L (03-10-21 @ 17:43)    ======================Micro/Rad/Cardio=================  Telemetry: Reviewed   EKG: Reviewed  CXR: Reviewed  Culture: Reviewed   Echo: Reviewed  Cath: Reviewed  ======================================================  PAST MEDICAL & SURGICAL HISTORY:  Neuropathy    Falls    Transient ischemic attack (TIA)  Jan 2014- with residual weakness on right leg    OAB (overactive bladder)    Type 2 diabetes mellitus  diet controlled    Compression fracture of spine    Aortic stenosis    Compression fracture of spine  2015 lumbar    OA (osteoarthritis)    CAD (coronary artery disease)    HLD (hyperlipidemia)    Hypertension    H/O oral surgery    History of cataract surgery    History of coronary artery stent placement  KILEY to mLAD (Jan 2021)      ====================ASSESSMENT ==============  S/P TAVR core valve   CHB  Hypotension  Anemia    Plan:  ====================== NEUROLOGY=====================  A&O x3, Nonfocal  - continue to monitor neuro status per ICU protocol.   - C/w Gabapentin 300mg TID for neuropathic pain    gabapentin 300 milliGRAM(s) Oral three times a day  ==================== RESPIRATORY======================  Comfortable on room air, SpO2 100%  - Continue to monitor SpO2 via pulse oximetry  - Encourage bedside spirometry     ====================CARDIOVASCULAR==================  AS w/ CHB  - S/P TAVR w/ core   - Left femoral TVP in place  - Scheduled for PPM tomorrow AM    Hx of CAD  - C/w DAPT: ASA and Plavix  - Monitor continuous telemetry    Hypotension  - Requiring pressor support with IV Levo 0.07mcg gtt  - Invasive hemodynamic monitoring, assess perfusion indices.     norepinephrine Infusion 0.07 MICROgram(s)/kG/Min (8.61 mL/Hr) IV Continuous <Continuous>  aspirin enteric coated 81 milliGRAM(s) Oral daily  clopidogrel Tablet 75 milliGRAM(s) Oral daily  ===================HEMATOLOGIC/ONC ===================  Anemia, H/H 9.5/25.4.   - Continue to monitor hemoglobin and hematocrit levels.     ===================== RENAL =========================  No active issues   - Continue to monitor I/Os, BUN/Creatinine, and urine output.   - Goal net negative fluid balance. Replete lytes PRN. Keep K> 4 and Mg >2.     ==================== GASTROINTESTINAL===================  NPO at this time.    potassium chloride    Tablet ER 20 milliEquivalent(s) Oral every 2 hours  sodium chloride 0.9% lock flush 3 milliLiter(s) IV Push every 8 hours  =======================    ENDOCRINE  =====================  Bgl controlled  - monitor glucose for need to initiate sliding scale.   ========================INFECTIOUS DISEASE================  Afebrile, WBC within normal limits.   - Monitor temperature and trend WBC.  - C/w periop abx coverage w/ Cefazolin    ceFAZolin   IVPB 2000 milliGRAM(s) IV Intermittent every 8 hours      Patient requires continuous monitoring with bedside rhythm monitoring, pulse ox monitoring, and intermittent blood gas analysis. Care plan discussed with ICU care team. Patient remained critical and at risk for life threatening decompensation.  Patient seen, examined and plan discussed with CCU team during rounds.     I have personally provided 35 minutes of critical care time excluding time spent on separate procedures.    By signing my name below, I, Elena Bond, attest that this documentation has been prepared under the direction and in the presence of JASPER Bellamy.  Electronically signed: Carlitos Watson, 03-10-21 @ 20:54    I, JASPER Bellamy, personally performed the services described in this documentation. all medical record entries made by the carlitos were at my direction and in my presence. I have reviewed the chart and agree that the record reflects my personal performance and is accurate and complete  Electronically signed: JASPER Bellamy.       RAFAEL NO  MRN-[id number]  Patient is a 75y old  Female who presents with a chief complaint of s/p TAVR (10 Mar 2021 19:25)    HPI:  76 y/o female with history severe AS,  HTN, HLD, CAD s/p cardiac cath with KILEY placement on 1/29/21, lumbar spine compression fracture, neuropathy, B/L lower extremity weakness.  She complains of worsening dyspnea with exertion and fatigue, but denies chest pain, palpitations, cough, wheezing and recent illness.  She was referred by her cardiologist for evaluation of worsening aortic stenosis and is scheduled for TAVR today. Intraop found to be in complete heart block requiring left femoral TVP, transferred to CICU for further management. Course complicated by hypotension now on levophed.   (10 Mar 2021 17:34)      Hospital Course:  3/10 Transferred to CICU, S/P TAVR, now w/ CHB    24 HOUR EVENTS:  S/P TAVR, now w/ CHB s/p TVP  weaned off levo gtt   NPO after midnight for PPM in AM     REVIEW OF SYSTEMS:   Constitutional: No weakness, fevers, or chills  Eyes/ENT: No visual changes  Respiratory: No cough, wheezing, hemoptysis  Cardiovascular: No chest pain, no palpitations  Gastrointestinal: No abdominal pain. No nausea, vomiting, hematemesis.   Genitourinary: No dysuria  Neurological: No numbness, no weakness  Skin: No itching, rashes    ICU Vital Signs Last 24 Hrs  T(C): 36.5 (10 Mar 2021 19:15), Max: 36.8 (10 Mar 2021 17:00)  T(F): 97.7 (10 Mar 2021 19:15), Max: 98.2 (10 Mar 2021 17:00)  HR: 82 (10 Mar 2021 20:45) (48 - 88)  BP: 125/53 (10 Mar 2021 19:30) (75/35 - 125/53)  BP(mean): 79 (10 Mar 2021 19:30) (44 - 79)  ABP: 160/46 (10 Mar 2021 20:45) (98/36 - 174/50)  ABP(mean): 78 (10 Mar 2021 20:45) (58 - 88)  RR: 16 (10 Mar 2021 20:45) (11 - 20)  SpO2: 100% (10 Mar 2021 20:45) (95% - 100%)    I&O's Summary    10 Mar 2021 07:01  -  10 Mar 2021 20:54  --------------------------------------------------------  IN: 507.6 mL / OUT: 0 mL / NET: 507.6 mL    POCT Blood Glucose.: 95 mg/dL (10 Mar 2021 12:33)      PHYSICAL EXAM:   General: No acute distress  Eyes: EOMI, PERRLA, conjunctiva and sclera clear  Chest/Lung: CTAB, no wheezes, rales, or rhonchi  Heart: Regular rate, regular rhythm. Normal S1/S2. No murmurs, rubs, or gallops.  Abdomen: Soft, nontender, nondistended. Normal bowel sounds.  Extremites: 2+ peripheral pulses B/L. No clubbing, cyanosis, or edema.  Neurology: A&O x3, no focal deficits  Skin: No rashes or lesions  ============================I/O===========================   I&O's Detail    10 Mar 2021 07:01  -  10 Mar 2021 20:54  --------------------------------------------------------  IN:    IV PiggyBack: 500 mL    Norepinephrine: 7.6 mL  Total IN: 507.6 mL    OUT:  Total OUT: 0 mL    Total NET: 507.6 mL        ============================ LABS =========================                        9.5    6.01  )-----------( 189      ( 10 Mar 2021 17:45 )             29.4     03-10    137  |  101  |  15  ----------------------------<  139<H>  3.4<L>   |  21<L>  |  0.56    Ca    8.3<L>      10 Mar 2021 17:45  Phos  3.0     03-10    TPro  5.1<L>  /  Alb  3.1<L>  /  TBili  0.4  /  DBili  x   /  AST  13  /  ALT  8<L>  /  AlkPhos  49  03-10      LIVER FUNCTIONS - ( 10 Mar 2021 17:45 )  Alb: 3.1 g/dL / Pro: 5.1 g/dL / ALK PHOS: 49 U/L / ALT: 8 U/L / AST: 13 U/L / GGT: x           Blood Gas Venous - Lactate: 1.3 mmoL/L (03-10-21 @ 17:43)    ======================Micro/Rad/Cardio=================  Telemetry: Reviewed   EKG: Reviewed  CXR: Reviewed  Culture: Reviewed   Echo: Reviewed  Cath: Reviewed  ======================================================  PAST MEDICAL & SURGICAL HISTORY:  Neuropathy    Falls    Transient ischemic attack (TIA)  Jan 2014- with residual weakness on right leg    OAB (overactive bladder)    Type 2 diabetes mellitus  diet controlled    Compression fracture of spine    Aortic stenosis    Compression fracture of spine  2015 lumbar    OA (osteoarthritis)    CAD (coronary artery disease)    HLD (hyperlipidemia)    Hypertension    H/O oral surgery    History of cataract surgery    History of coronary artery stent placement  KILEY to mLAD (Jan 2021)      ====================ASSESSMENT ==============  S/P TAVR core valve   CHB  Hypotension  Anemia    Plan:  ====================== NEUROLOGY=====================  A&O x3, Nonfocal  - continue to monitor neuro status per ICU protocol.   - C/w Gabapentin 300mg TID for neuropathic pain    gabapentin 300 milliGRAM(s) Oral three times a day  ==================== RESPIRATORY======================  Comfortable on room air, SpO2 100%  - Continue to monitor SpO2 via pulse oximetry  - Encourage bedside spirometry     ====================CARDIOVASCULAR==================  AS c/b CHB  - S/P TAVR   - Left femoral TVP in place. HR 80 with MV 10. Currently completely pacer dependent  - Scheduled for PPM tomorrow AM with Dr. Jolley. NPO post midnight     Hx of CAD  - C/w DAPT: ASA and Plavix  - Monitor continuous telemetry    Hypotension  - Weaned off Levophed gtt  - Holding home antihypertensives. consider re-initiation tomorrow if remains HD stable.   - Invasive hemodynamic monitoring, assess perfusion indices.     norepinephrine Infusion 0.07 MICROgram(s)/kG/Min (8.61 mL/Hr) IV Continuous <Continuous>  aspirin enteric coated 81 milliGRAM(s) Oral daily  clopidogrel Tablet 75 milliGRAM(s) Oral daily  ===================HEMATOLOGIC/ONC ===================  Anemia, H/H 9.5/25.4.   - Continue to monitor hemoglobin and hematocrit levels.     ===================== RENAL =========================  No active issues   - Continue to monitor I/Os, BUN/Creatinine, and urine output.   - Goal net negative fluid balance. Replete lytes PRN. Keep K> 4 and Mg >2.     ==================== GASTROINTESTINAL===================  NPO at this time.  Passed bedside S/S  Monitor for flatulence     potassium chloride    Tablet ER 20 milliEquivalent(s) Oral every 2 hours  sodium chloride 0.9% lock flush 3 milliLiter(s) IV Push every 8 hours  =======================    ENDOCRINE  =====================  Bgl controlled  - monitor glucose for need to initiate sliding scale.   ========================INFECTIOUS DISEASE================  Afebrile, WBC within normal limits.   - Monitor temperature and trend WBC.  - C/w post-op abx coverage w/ Cefazolin  - Vanc x1 dose pre-op for PPM ordered for am    ceFAZolin   IVPB 2000 milliGRAM(s) IV Intermittent every 8 hours      Patient requires continuous monitoring with bedside rhythm monitoring, pulse ox monitoring, and intermittent blood gas analysis. Care plan discussed with ICU care team. Patient remained critical and at risk for life threatening decompensation.  Patient seen, examined and plan discussed with CCU team during rounds.     I have personally provided 35 minutes of critical care time excluding time spent on separate procedures.    By signing my name below, I, Elena Bond, attest that this documentation has been prepared under the direction and in the presence of JASPER Bellamy.  Electronically signed: Migdalia Watson, 03-10-21 @ 20:54    I, JASPER Bellamy, personally performed the services described in this documentation. all medical record entries made by the scribe were at my direction and in my presence. I have reviewed the chart and agree that the record reflects my personal performance and is accurate and complete  Electronically signed: JASPER Bellamy.

## 2021-03-10 NOTE — H&P ADULT - HISTORY OF PRESENT ILLNESS
74 y/o female with history severe AS,  HTN, HLD, CAD s/p cardiac cath with KILEY placement on 1/29/21, lumbar spine compression fracture, neuropathy, B/L lower extremity weakness.  She complains of worsening dyspnea with exertion and fatigue, but denies chest pain, palpitations, cough, wheezing and recent illness.  She was referred by her cardiologist for evaluation of worsening aortic stenosis and is scheduled for TAVR today. Received to CCU s/p TAVR with Left femoral TVP in place.    76 y/o female with history severe AS,  HTN, HLD, CAD s/p cardiac cath with KILEY placement on 1/29/21, lumbar spine compression fracture, neuropathy, B/L lower extremity weakness.  She complains of worsening dyspnea with exertion and fatigue, but denies chest pain, palpitations, cough, wheezing and recent illness.  She was referred by her cardiologist for evaluation of worsening aortic stenosis and is scheduled for TAVR today. Intraop found to be in complete heart block requiring left femoral TVP, transferred to CICU for further management. Course complicated by hypotension now on levophed.

## 2021-03-10 NOTE — CONSULT NOTE ADULT - SUBJECTIVE AND OBJECTIVE BOX
Patient seen and evaluated at bedside    Chief Complaint: TAVR    HPI:  75F PMHx includes HTN, HLD, CAD s/p cardiac cath with KILEY placement on 1/29/21, aortic stenosis, lumbar spine compression fracture, neuropathy, B/L lower extremity weakness, presents s/p TAVR 3/10 via R transfemoral TAVR #26 Core Valve. Intraoperative course complicated by CHB, pt currently with TVP via L groin access.       PMHx:   Neuropathy  Falls  Transient ischemic attack (TIA)  OAB (overactive bladder)  Type 2 diabetes mellitus  Compression fracture of spine  Aortic stenosis  Compression fracture of spine  OA (osteoarthritis)  CAD (coronary artery disease)  HLD (hyperlipidemia)  Hypertension    PSHx:   H/O oral surgery  History of cataract surgery  History of coronary artery stent placement    Allergies:  Vicodin (Vomiting; Nausea)    Home Meds:   Asprin  Plavix  Lovastatin   Ramipril   Amlodipine   Ditropan     Current Medications:   aspirin enteric coated 81 milliGRAM(s) Oral daily  ceFAZolin   IVPB 2000 milliGRAM(s) IV Intermittent every 8 hours  cefuroxime  IVPB 1500 milliGRAM(s) IV Intermittent once  chlorhexidine 2% Cloths 1 Application(s) Topical once  lidocaine 1% Injectable 0.2 milliLiter(s) Local Injection once  sodium chloride 0.9% lock flush 3 milliLiter(s) IV Push every 8 hours      FAMILY HISTORY:  FH: heart disease  father, grandfather    FH: stroke  mother    FH: type 2 diabetes  grandfather        Social History:  Smoking History:  Alcohol Use:  Drug Use:    Review of Systems:  Unable to assess given recent use of general anesthesia     Physical Exam:  T(F): 97.7 (03-10), Max: 97.7 (03-10)  HR: 79 (03-10) (79 - 79)  BP: 121/76 (03-10) (121/76 - 121/76)  RR: 16 (03-10)  SpO2: 99% (03-10)    GENERAL: No acute distress, well-developed  HEAD:  Atraumatic, Normocephalic  ENT: EOMI, PERRLA, conjunctiva and sclera clear, Neck supple, No JVD, moist mucosa  CHEST/LUNG: Clear to auscultation bilaterally; No wheeze, equal breath sounds bilaterally   BACK: No spinal tenderness  HEART: Regular rate and rhythm; No murmurs, rubs, or gallops  ABDOMEN: Soft, Nontender, Nondistended; Bowel sounds present  EXTREMITIES:  No clubbing, cyanosis, or edema  PSYCH: Nl behavior, nl affect  NEUROLOGY: AAOx3, non-focal, cranial nerves intact  SKIN: Normal color, No rashes or lesions  LINES:    Cardiovascular Diagnostic Testing:    ECG: Personally reviewed:    Echo:   < from: Transthoracic Echocardiogram (12.29.20 @ 11:10) >  ------------------------------------------------------------------------  CONCLUSIONS:  1. Calcified trileaflet aortic valve with decreased  opening.  There is calcification in the left ventricular outflow  tract. Peak transaortic valve gradient equals 130 mm Hg,  mean transaortic valve gradient equals 65 mm Hg, estimated  aortic valve area equals 0.7 sqcm (by continuity equation),  aortic valve velocity time integral equals 137 cm, the DI=  0.20, consistent with severeaortic stenosis. Mild-moderate  aortic regurgitation.  2. Normal left ventricular size with mild concentric  hypertrophy.  3. Hyperdynamic left ventricular systolic function with a  resting intracavitary gradients=9mmHg which increased to 18  mmHg with valsalva.  *** No previous Echo exam.  ------------------------------------------------------------------------  PROCEDURE DESCRIPTION: Transthoracic echocardiogram with  2-D, M-Mode and complete spectral and color flow Doppler.  ------------------------------------------------------------------------  ECHOCARDIOGRAPHIC EXAMINATION:  AORTIC ROOT:  Aortic Root (Leaflet): 3.0 cm  Aortic Root Index: 0.9  LEFT ATRIUM:  AP Dimensions: 3.6 cm  LA Volume Index: 60.00 cc/sqm  LA Volume: 102.4 cc  LEFT VENTRICLE:  LVIDd: 3.2 cm  LVIDs: 1.7 cm  Fraction Short: 46 %  IVS: 1.38  ILWT: 1.2 cm  RWT: 0.78  LV Mass: 145.0 gm  LV Mass Index: 85 gm/sqm  HR and BP:  HR: 78 bpm  BP: 136/69  BSA: 1.71  TRICUSPID VALVE:  TR Velocity: 2.6 M/s  TR Gradient: 27.0 mm Hg  ------------------------------------------------------------------------  HEMODYNAMICS:  RA Pressure Estimate: 8  RVSP: 35  LA Pressure Estimate: > 20  PAS: 35  IVRT: 106 ms  ------------------------------------------------------------------------  COLOR FLOW and SPECIAL DOPPLER:  AORTIC VALVE:  AV Peak Velocity: 5.7 M/sec  AV Peak Gradient: 129 mm Hg  AV Mean Gradient: 65 mm Hg  Valve Area: 0.7 sqcm  LVOT:  LVOT Velocity: 1.0 M/sec  LVOT Diameter: 2.1 cm  LVOT Peak Gradient Rest: 4 mm Hg  LVOT Mean Gradient Rest: 5 mm Hg  ------------------------------------------------------------------------  DIASTOLIC FUNCTION:  DT:327 ms  E/A: 0.41  e' Septal: 5.0 cm/s  E/e' Septal: 14.0  e' Lateral: 4.0 cm/s  E/e' Lateral: 17.5  MV E wave: 0.7 m/s  MV A wave: 1.7 m/s  Septal a': 10.0 cm/s  Lateral a': 13.0 cm/s  ------------------------------------------------------------------------  Confirmed on  12/29/2020 - 12:34:05 by Mojgan Bazan M.D.  ------------------------------------------------------------------------    < end of copied text >      Cath:  < from: Cardiac Cath Lab - Adult (01.29.21 @ 10:56) >  Case Physician(s):  VALDO Cervantes M.D.  Fellow:  John Mascorro MD  Referring Physician:  Josesito Paula M.D.  INDICATIONS: Unstable angina - CCS4. ACS with rest angina less than 24 hrs  HISTORY: Aortic valve: history of . The patient has hypertension.  PROCEDURE:  --  Left coronary angiography.  --  Right coronary angiography.  --  Intervention on mid LAD: drug-eluting stent.  TECHNIQUE: The risks and alternatives of the procedures and conscious  sedation were explained to the patient and informed consent was obtained.  Cardiac catheterization performed electively. Coronary intervention  performed electively.  Local anesthetic given. Right radial artery access. Left coronary artery  angiography. The vessel was injected utilizing a catheter. Right coronary  artery angiography. The vessel was injected utilizing a catheter.  RADIATION EXPOSURE: 8.2 min. A successful drug-eluting stent was performed  on the 80 % lesion in themid LAD. Following intervention there was a 1 %  residual stenosis. According to the ACC/AHA classification system, this  lesion was a type C lesion. There was HERBERT 3 flow before the procedure and  HERBERT 3 flow after the procedure. There was no dissection. Vessel setup was  performed. A 6FR JL4 LAUNCHER guiding catheter was used to intubate the  vessel. Vessel setup was performed. A SOFIYA [id number] wire was used to  cross the lesion. A 3.50 X 12 SYNERGY XD drug-eluting stent was placed  across the lesion and deployed at a maximum inflation pressure of 22 kendra.  CONTRAST GIVEN: Omnipaque 36 ml. Omnipaque 81 ml.  MEDICATIONS GIVEN: Midazolam, 1 mg, IV. Fentanyl, 25 mcg, IV. Verapamil  (Isoptin, Calan, Covera), 2.5 mg, IA. Nicardipine (Cardene), 250 mcg,  intracoronary. Nitroglycerin, 100 mcg, intracoronary. Heparin, 3000 units,  IA. Heparin, 4000 units, IV. Aspirin, 81 mg, PO. Clopidogrel (Plavix), 600  mg, PO.  CORONARY VESSELS: The coronary circulation is right dominant.  LM:   --  LM: Normal.  LAD:   --  Mid LAD: There was a 80 % stenosis.  --  D1: There was a 30 % stenosis.  CX:   --  Circumflex: Normal.  RCA:   --  RCA: Normal.  COMPLICATIONS: There were no complications.  INTERVENTIONAL RECOMMENDATIONS: DAPT for 3 mths    < end of copied text >      Imaging:    CXR: Personally reviewed    Labs: Personally reviewed                        11.1   4.40  )-----------( 265      ( 08 Mar 2021 18:30 )             35.1     03-08    139  |  102  |  23  ----------------------------<  77  3.8   |  23  |  0.55    Ca    10.4      08 Mar 2021 18:30                   Patient seen and evaluated at bedside    Chief Complaint: TAVR    HPI:  75F PMHx includes HTN, HLD, CAD s/p cardiac cath with KILEY placement on 1/29/21, aortic stenosis, lumbar spine compression fracture, neuropathy, B/L lower extremity weakness, presents s/p TAVR 3/10 via R transfemoral TAVR #26 Core Valve. Intraoperative course complicated by CHB, pt currently with TVP via L groin access. EP consulted for PPM, Currently CHB intermittently pacing 60's on telemetry.       PMHx:   Neuropathy  Falls  Transient ischemic attack (TIA)  OAB (overactive bladder)  Type 2 diabetes mellitus  Compression fracture of spine  Aortic stenosis  Compression fracture of spine  OA (osteoarthritis)  CAD (coronary artery disease)  HLD (hyperlipidemia)  Hypertension    PSHx:   H/O oral surgery  History of cataract surgery  History of coronary artery stent placement    Allergies:  Vicodin (Vomiting; Nausea)    Home Meds:   Asprin  Plavix  Lovastatin   Ramipril   Amlodipine   Ditropan     Current Medications:   aspirin enteric coated 81 milliGRAM(s) Oral daily  ceFAZolin   IVPB 2000 milliGRAM(s) IV Intermittent every 8 hours  cefuroxime  IVPB 1500 milliGRAM(s) IV Intermittent once  chlorhexidine 2% Cloths 1 Application(s) Topical once  lidocaine 1% Injectable 0.2 milliLiter(s) Local Injection once  sodium chloride 0.9% lock flush 3 milliLiter(s) IV Push every 8 hours      FAMILY HISTORY:  FH: heart disease  father, grandfather    FH: stroke  mother    FH: type 2 diabetes  grandfather        Social History:  Denies     Review of Systems:  Unable to assess given recent use of general anesthesia     Physical Exam:  T(F): 97.7 (03-10), Max: 97.7 (03-10)  HR: 79 (03-10) (79 - 79)  BP: 121/76 (03-10) (121/76 - 121/76)  RR: 16 (03-10)  SpO2: 99% (03-10)    GENERAL: frail elderly woman   HEAD:  Atraumatic, Normocephalic  ENT:  Neck supple, No JVD, moist mucosa  CHEST/LUNG: Clear to auscultation bilaterally  BACK: No spinal tenderness  HEART: Regular rate and rhythm; No murmurs, rubs, or gallops +L groin TVP  ABDOMEN: Soft, Nontender  EXTREMITIES:  No clubbing, cyanosis, or edema  PSYCH: Nl behavior, nl affect  NEUROLOGY: AAOx3, non-focal  SKIN: Normal color, No rashes or lesions  LINES:    Cardiovascular Diagnostic Testing:    ECG: Sinus rhythm w CHB w LBBB and junctional escape 50's    Echo:   < from: Transthoracic Echocardiogram (12.29.20 @ 11:10) >  ------------------------------------------------------------------------  CONCLUSIONS:  1. Calcified trileaflet aortic valve with decreased  opening.  There is calcification in the left ventricular outflow  tract. Peak transaortic valve gradient equals 130 mm Hg,  mean transaortic valve gradient equals 65 mm Hg, estimated  aortic valve area equals 0.7 sqcm (by continuity equation),  aortic valve velocity time integral equals 137 cm, the DI=  0.20, consistent with severeaortic stenosis. Mild-moderate  aortic regurgitation.  2. Normal left ventricular size with mild concentric  hypertrophy.  3. Hyperdynamic left ventricular systolic function with a  resting intracavitary gradients=9mmHg which increased to 18  mmHg with valsalva.  *** No previous Echo exam.  ------------------------------------------------------------------------  PROCEDURE DESCRIPTION: Transthoracic echocardiogram with  2-D, M-Mode and complete spectral and color flow Doppler.  ------------------------------------------------------------------------  ECHOCARDIOGRAPHIC EXAMINATION:  AORTIC ROOT:  Aortic Root (Leaflet): 3.0 cm  Aortic Root Index: 0.9  LEFT ATRIUM:  AP Dimensions: 3.6 cm  LA Volume Index: 60.00 cc/sqm  LA Volume: 102.4 cc  LEFT VENTRICLE:  LVIDd: 3.2 cm  LVIDs: 1.7 cm  Fraction Short: 46 %  IVS: 1.38  ILWT: 1.2 cm  RWT: 0.78  LV Mass: 145.0 gm  LV Mass Index: 85 gm/sqm  HR and BP:  HR: 78 bpm  BP: 136/69  BSA: 1.71  TRICUSPID VALVE:  TR Velocity: 2.6 M/s  TR Gradient: 27.0 mm Hg  ------------------------------------------------------------------------  HEMODYNAMICS:  RA Pressure Estimate: 8  RVSP: 35  LA Pressure Estimate: > 20  PAS: 35  IVRT: 106 ms  ------------------------------------------------------------------------  COLOR FLOW and SPECIAL DOPPLER:  AORTIC VALVE:  AV Peak Velocity: 5.7 M/sec  AV Peak Gradient: 129 mm Hg  AV Mean Gradient: 65 mm Hg  Valve Area: 0.7 sqcm  LVOT:  LVOT Velocity: 1.0 M/sec  LVOT Diameter: 2.1 cm  LVOT Peak Gradient Rest: 4 mm Hg  LVOT Mean Gradient Rest: 5 mm Hg  ------------------------------------------------------------------------  DIASTOLIC FUNCTION:  DT:327 ms  E/A: 0.41  e' Septal: 5.0 cm/s  E/e' Septal: 14.0  e' Lateral: 4.0 cm/s  E/e' Lateral: 17.5  MV E wave: 0.7 m/s  MV A wave: 1.7 m/s  Septal a': 10.0 cm/s  Lateral a': 13.0 cm/s  ------------------------------------------------------------------------  Confirmed on  12/29/2020 - 12:34:05 by Mojgan Bazan M.D.  ------------------------------------------------------------------------    < end of copied text >      Cath:  < from: Cardiac Cath Lab - Adult (01.29.21 @ 10:56) >  Case Physician(s):  VALDO Cervantes M.D.  Fellow:  John Mascorro MD  Referring Physician:  Josesito Paula M.D.  INDICATIONS: Unstable angina - CCS4. ACS with rest angina less than 24 hrs  HISTORY: Aortic valve: history of . The patient has hypertension.  PROCEDURE:  --  Left coronary angiography.  --  Right coronary angiography.  --  Intervention on mid LAD: drug-eluting stent.  TECHNIQUE: The risks and alternatives of the procedures and conscious  sedation were explained to the patient and informed consent was obtained.  Cardiac catheterization performed electively. Coronary intervention  performed electively.  Local anesthetic given. Right radial artery access. Left coronary artery  angiography. The vessel was injected utilizing a catheter. Right coronary  artery angiography. The vessel was injected utilizing a catheter.  RADIATION EXPOSURE: 8.2 min. A successful drug-eluting stent was performed  on the 80 % lesion in themid LAD. Following intervention there was a 1 %  residual stenosis. According to the ACC/AHA classification system, this  lesion was a type C lesion. There was HERBERT 3 flow before the procedure and  HERBERT 3 flow after the procedure. There was no dissection. Vessel setup was  performed. A 6FR JL4 LAUNCHER guiding catheter was used to intubate the  vessel. Vessel setup was performed. A SOFIYA [id number] wire was used to  cross the lesion. A 3.50 X 12 SYNERGY XD drug-eluting stent was placed  across the lesion and deployed at a maximum inflation pressure of 22 kendra.  CONTRAST GIVEN: Omnipaque 36 ml. Omnipaque 81 ml.  MEDICATIONS GIVEN: Midazolam, 1 mg, IV. Fentanyl, 25 mcg, IV. Verapamil  (Isoptin, Calan, Covera), 2.5 mg, IA. Nicardipine (Cardene), 250 mcg,  intracoronary. Nitroglycerin, 100 mcg, intracoronary. Heparin, 3000 units,  IA. Heparin, 4000 units, IV. Aspirin, 81 mg, PO. Clopidogrel (Plavix), 600  mg, PO.  CORONARY VESSELS: The coronary circulation is right dominant.  LM:   --  LM: Normal.  LAD:   --  Mid LAD: There was a 80 % stenosis.  --  D1: There was a 30 % stenosis.  CX:   --  Circumflex: Normal.  RCA:   --  RCA: Normal.  COMPLICATIONS: There were no complications.  INTERVENTIONAL RECOMMENDATIONS: DAPT for 3 mths    < end of copied text >      Imaging:    CXR: Personally reviewed    Labs: Personally reviewed                        11.1   4.40  )-----------( 265      ( 08 Mar 2021 18:30 )             35.1     03-08    139  |  102  |  23  ----------------------------<  77  3.8   |  23  |  0.55    Ca    10.4      08 Mar 2021 18:30

## 2021-03-10 NOTE — PROGRESS NOTE ADULT - SUBJECTIVE AND OBJECTIVE BOX
This patient has been implanted with a Transcatheter Aortic Valve Implant under the following NCT/ANGELA:    TAVR:    Commercial Implant NCT [id number], ANGELA N/A and will be placed into the TVT Registry.    JASPER Ferrer  [id number]

## 2021-03-10 NOTE — PATIENT PROFILE ADULT - TOBACCO USE
History of Present Illness  HPI: Pt was hospitalized 9/5-9/8 in [facility] Discharge Initial Follow-Up Call: The patient is being contacted for follow-up after hospitalization  The date of discharge is 9/8/2016  I spoke with Taylor   The patient was discharged to home  The patient is a <1/2 PPD smoker  The patient is experiencing the following symptoms: no wheezing, no cough, no dyspnea and no fever  Oxygen is used with exertion  Zone tool status is green  Follow-up appointment(s) were made with the PCP on 9/20/2016  The following topics were reviewed:  COPD zone tool: when to take action, smoking cessation, DME [employer] for O2 (NEW), physician follow-ups and medication compliance  The patient felt ready to be discharged from the hospital  The patient was not given written &/or veral educational materials specific to his COPD diagnosis  The patient was given the opportunity to walk in the hallways with staff assistance to assess his breathing status and review breathing techniques to help prevent increasing shortness of breath  The patient was not instructed on when to return to his normal activities  The staff did not offer to assist the patient in making follow-up appointments with his family doctor or other specialists  Narrative Summary:    Taylor reports feeling improved since DC  [facility] appt completed last week (12 days after DC with new O2) with complete review of medications  Today we reviewed s/s to be aware of for an acute occurrence of exacerbation, when to take medications  Using nebulizer with albuterol  Reports he is using O2 only occasionally with exertion or at HS on occasion  FBS checks have been running around 120  Has upcoming cardiology appt and PCP later on next month  Current Meds    1  Esomeprazole Magnesium 40 MG Oral Capsule Delayed Release (NexIUM); TAKE 1   CAPSULE ONCE DAILY  Requested for: [id number]; Last Rx:[id number] Ordered    2   Gabapentin 300 MG Oral Capsule; take 1 capsule by mouth at bedtime  Requested for:   [id number]; Last Rx:[id number] Ordered    3  Advair Diskus 250-50 MCG/DOSE Inhalation Aerosol Powder Breath Activated; INHALE 1   PUFF EVERY 12 HOURS FOR 30 DAYS  Requested for: [id number]; Last   Rx:[id number] Ordered   4  Combivent  MCG/ACT AERO; INHALE 2 PUFFS Every 6 hours; Therapy: (Recorded:[id number]) to Recorded   5  Montelukast Sodium 10 MG Oral Tablet (Singulair); TAKE 1 TABLET DAILY  Requested   for: [id number]; Last Rx:[id number] Ordered    6  Lisinopril 10 MG Oral Tablet; TAKE 1 TABLET DAILY  Requested for: [id number]; Last   Rx:[id number] Ordered    7  Nicotine 14 MG/24HR Transdermal Patch 24 Hour; APPLY 1 PATCH ONTO SKIN DAILY   FOR 30 DAYS; Therapy: (Recorded:[id number]) to Recorded    8  [employer] 2 Test In Qeexo; TEST THREE TIMES DAILY; Therapy: [id number] to (Evaluate:19Mar2017)  Requested for: [id number]; Last   Rx:[id number] Ordered   9  Lantus 100 UNIT/ML Subcutaneous Solution; INJECT 30 UNITS UNDER THE SKIN AT   BEDTIME  Requested for: [id number]; Last Rx:[id number] Ordered   10  MetFORMIN HCl - 850 MG Oral Tablet; TAKE 1 TABLET BY MOUTH DAILY WITH    BREAKFEST; Therapy: (Recorded:[id number]) to Recorded    Allergies    1  No Known Drug Allergies    2  No Known Environmental Allergies   3  No Known Food Allergies    Future Appointments    Date/Time Provider Specialty Site   10/18/2016 10:30 AM BIB [place]   Internal Medicine [facility]   10/07/2016 01:30 PM Cardiology, [facility]   10/07/2016 02:00 PM Tiera Koch DO Cardiology  CARDIOLOGY  [place]     Signatures   Electronically signed by : Halima Robles, ; Sep 26 2016  1:51PM EST                       (Author) Never smoker

## 2021-03-11 DIAGNOSIS — Z95.2 PRESENCE OF PROSTHETIC HEART VALVE: ICD-10-CM

## 2021-03-11 LAB
A1C WITH ESTIMATED AVERAGE GLUCOSE RESULT: 5.3 % — SIGNIFICANT CHANGE UP (ref 4–5.6)
ALBUMIN SERPL ELPH-MCNC: 3.7 G/DL — SIGNIFICANT CHANGE UP (ref 3.3–5)
ALP SERPL-CCNC: 49 U/L — SIGNIFICANT CHANGE UP (ref 40–120)
ALT FLD-CCNC: 6 U/L — LOW (ref 10–45)
ANION GAP SERPL CALC-SCNC: 13 MMOL/L — SIGNIFICANT CHANGE UP (ref 5–17)
APTT BLD: 29.9 SEC — SIGNIFICANT CHANGE UP (ref 27.5–35.5)
AST SERPL-CCNC: 19 U/L — SIGNIFICANT CHANGE UP (ref 10–40)
BASOPHILS # BLD AUTO: 0.03 K/UL — SIGNIFICANT CHANGE UP (ref 0–0.2)
BASOPHILS NFR BLD AUTO: 0.5 % — SIGNIFICANT CHANGE UP (ref 0–2)
BILIRUB SERPL-MCNC: 0.6 MG/DL — SIGNIFICANT CHANGE UP (ref 0.2–1.2)
BUN SERPL-MCNC: 11 MG/DL — SIGNIFICANT CHANGE UP (ref 7–23)
CALCIUM SERPL-MCNC: 8.8 MG/DL — SIGNIFICANT CHANGE UP (ref 8.4–10.5)
CHLORIDE SERPL-SCNC: 103 MMOL/L — SIGNIFICANT CHANGE UP (ref 96–108)
CHOLEST SERPL-MCNC: 108 MG/DL — SIGNIFICANT CHANGE UP
CO2 SERPL-SCNC: 21 MMOL/L — LOW (ref 22–31)
CREAT SERPL-MCNC: 0.45 MG/DL — LOW (ref 0.5–1.3)
EOSINOPHIL # BLD AUTO: 0.06 K/UL — SIGNIFICANT CHANGE UP (ref 0–0.5)
EOSINOPHIL NFR BLD AUTO: 0.9 % — SIGNIFICANT CHANGE UP (ref 0–6)
ESTIMATED AVERAGE GLUCOSE: 105 MG/DL — SIGNIFICANT CHANGE UP (ref 68–114)
GAS PNL BLDA: SIGNIFICANT CHANGE UP
GLUCOSE BLDC GLUCOMTR-MCNC: 214 MG/DL — HIGH (ref 70–99)
GLUCOSE SERPL-MCNC: 98 MG/DL — SIGNIFICANT CHANGE UP (ref 70–99)
HCT VFR BLD CALC: 28.3 % — LOW (ref 34.5–45)
HDLC SERPL-MCNC: 50 MG/DL — LOW
HGB BLD-MCNC: 9.4 G/DL — LOW (ref 11.5–15.5)
IMM GRANULOCYTES NFR BLD AUTO: 0.3 % — SIGNIFICANT CHANGE UP (ref 0–1.5)
INR BLD: 0.98 RATIO — SIGNIFICANT CHANGE UP (ref 0.88–1.16)
LIPID PNL WITH DIRECT LDL SERPL: 50 MG/DL — SIGNIFICANT CHANGE UP
LYMPHOCYTES # BLD AUTO: 0.93 K/UL — LOW (ref 1–3.3)
LYMPHOCYTES # BLD AUTO: 14.6 % — SIGNIFICANT CHANGE UP (ref 13–44)
MAGNESIUM SERPL-MCNC: 2.1 MG/DL — SIGNIFICANT CHANGE UP (ref 1.6–2.6)
MCHC RBC-ENTMCNC: 30.2 PG — SIGNIFICANT CHANGE UP (ref 27–34)
MCHC RBC-ENTMCNC: 33.2 GM/DL — SIGNIFICANT CHANGE UP (ref 32–36)
MCV RBC AUTO: 91 FL — SIGNIFICANT CHANGE UP (ref 80–100)
MONOCYTES # BLD AUTO: 0.6 K/UL — SIGNIFICANT CHANGE UP (ref 0–0.9)
MONOCYTES NFR BLD AUTO: 9.4 % — SIGNIFICANT CHANGE UP (ref 2–14)
NEUTROPHILS # BLD AUTO: 4.75 K/UL — SIGNIFICANT CHANGE UP (ref 1.8–7.4)
NEUTROPHILS NFR BLD AUTO: 74.3 % — SIGNIFICANT CHANGE UP (ref 43–77)
NON HDL CHOLESTEROL: 59 MG/DL — SIGNIFICANT CHANGE UP
NRBC # BLD: 0 /100 WBCS — SIGNIFICANT CHANGE UP (ref 0–0)
PHOSPHATE SERPL-MCNC: 2.9 MG/DL — SIGNIFICANT CHANGE UP (ref 2.5–4.5)
PLATELET # BLD AUTO: 188 K/UL — SIGNIFICANT CHANGE UP (ref 150–400)
POTASSIUM SERPL-MCNC: 4.5 MMOL/L — SIGNIFICANT CHANGE UP (ref 3.5–5.3)
POTASSIUM SERPL-SCNC: 4.5 MMOL/L — SIGNIFICANT CHANGE UP (ref 3.5–5.3)
PROT SERPL-MCNC: 5.6 G/DL — LOW (ref 6–8.3)
PROTHROM AB SERPL-ACNC: 11.8 SEC — SIGNIFICANT CHANGE UP (ref 10.6–13.6)
RBC # BLD: 3.11 M/UL — LOW (ref 3.8–5.2)
RBC # FLD: 12.7 % — SIGNIFICANT CHANGE UP (ref 10.3–14.5)
SARS-COV-2 IGG SERPL QL IA: POSITIVE
SARS-COV-2 IGM SERPL IA-ACNC: 3.32 INDEX — HIGH
SARS-COV-2 RNA SPEC QL NAA+PROBE: SIGNIFICANT CHANGE UP
SODIUM SERPL-SCNC: 137 MMOL/L — SIGNIFICANT CHANGE UP (ref 135–145)
TRIGL SERPL-MCNC: 44 MG/DL — SIGNIFICANT CHANGE UP
TSH SERPL-MCNC: 0.51 UIU/ML — SIGNIFICANT CHANGE UP (ref 0.27–4.2)
WBC # BLD: 6.39 K/UL — SIGNIFICANT CHANGE UP (ref 3.8–10.5)
WBC # FLD AUTO: 6.39 K/UL — SIGNIFICANT CHANGE UP (ref 3.8–10.5)

## 2021-03-11 PROCEDURE — 93010 ELECTROCARDIOGRAM REPORT: CPT

## 2021-03-11 PROCEDURE — 93306 TTE W/DOPPLER COMPLETE: CPT | Mod: 26

## 2021-03-11 PROCEDURE — 71045 X-RAY EXAM CHEST 1 VIEW: CPT | Mod: 26

## 2021-03-11 PROCEDURE — 33208 INSRT HEART PM ATRIAL & VENT: CPT | Mod: 59

## 2021-03-11 PROCEDURE — 99291 CRITICAL CARE FIRST HOUR: CPT | Mod: 25

## 2021-03-11 PROCEDURE — 99233 SBSQ HOSP IP/OBS HIGH 50: CPT

## 2021-03-11 PROCEDURE — 93308 TTE F-UP OR LMTD: CPT | Mod: 26

## 2021-03-11 PROCEDURE — 93010 ELECTROCARDIOGRAM REPORT: CPT | Mod: 77

## 2021-03-11 RX ORDER — AMLODIPINE BESYLATE 2.5 MG/1
5 TABLET ORAL DAILY
Refills: 0 | Status: DISCONTINUED | OUTPATIENT
Start: 2021-03-11 | End: 2021-03-12

## 2021-03-11 RX ORDER — LISINOPRIL 2.5 MG/1
20 TABLET ORAL DAILY
Refills: 0 | Status: DISCONTINUED | OUTPATIENT
Start: 2021-03-11 | End: 2021-03-12

## 2021-03-11 RX ORDER — CEPHALEXIN 500 MG
500 CAPSULE ORAL EVERY 12 HOURS
Refills: 0 | Status: COMPLETED | OUTPATIENT
Start: 2021-03-11 | End: 2021-03-14

## 2021-03-11 RX ORDER — ACETAMINOPHEN 500 MG
1000 TABLET ORAL ONCE
Refills: 0 | Status: COMPLETED | OUTPATIENT
Start: 2021-03-11 | End: 2021-03-11

## 2021-03-11 RX ORDER — ACETAMINOPHEN 500 MG
650 TABLET ORAL EVERY 6 HOURS
Refills: 0 | Status: DISCONTINUED | OUTPATIENT
Start: 2021-03-11 | End: 2021-03-15

## 2021-03-11 RX ORDER — ACETAMINOPHEN 500 MG
650 TABLET ORAL EVERY 6 HOURS
Refills: 0 | Status: DISCONTINUED | OUTPATIENT
Start: 2021-03-11 | End: 2021-03-11

## 2021-03-11 RX ORDER — CHLORHEXIDINE GLUCONATE 213 G/1000ML
1 SOLUTION TOPICAL
Refills: 0 | Status: DISCONTINUED | OUTPATIENT
Start: 2021-03-11 | End: 2021-03-11

## 2021-03-11 RX ADMIN — Medication 650 MILLIGRAM(S): at 13:10

## 2021-03-11 RX ADMIN — SODIUM CHLORIDE 3 MILLILITER(S): 9 INJECTION INTRAMUSCULAR; INTRAVENOUS; SUBCUTANEOUS at 05:41

## 2021-03-11 RX ADMIN — Medication 100 MILLIGRAM(S): at 05:40

## 2021-03-11 RX ADMIN — Medication 650 MILLIGRAM(S): at 13:40

## 2021-03-11 RX ADMIN — Medication 1000 MILLIGRAM(S): at 02:45

## 2021-03-11 RX ADMIN — Medication 20 MILLIEQUIVALENT(S): at 01:12

## 2021-03-11 RX ADMIN — ATORVASTATIN CALCIUM 10 MILLIGRAM(S): 80 TABLET, FILM COATED ORAL at 21:28

## 2021-03-11 RX ADMIN — Medication 400 MILLIGRAM(S): at 02:15

## 2021-03-11 RX ADMIN — SODIUM CHLORIDE 3 MILLILITER(S): 9 INJECTION INTRAMUSCULAR; INTRAVENOUS; SUBCUTANEOUS at 14:55

## 2021-03-11 RX ADMIN — AMLODIPINE BESYLATE 5 MILLIGRAM(S): 2.5 TABLET ORAL at 14:56

## 2021-03-11 RX ADMIN — Medication 500 MILLIGRAM(S): at 21:29

## 2021-03-11 RX ADMIN — Medication 250 MILLIGRAM(S): at 06:52

## 2021-03-11 RX ADMIN — GABAPENTIN 300 MILLIGRAM(S): 400 CAPSULE ORAL at 14:56

## 2021-03-11 RX ADMIN — Medication 650 MILLIGRAM(S): at 18:00

## 2021-03-11 RX ADMIN — GABAPENTIN 300 MILLIGRAM(S): 400 CAPSULE ORAL at 21:29

## 2021-03-11 RX ADMIN — GABAPENTIN 300 MILLIGRAM(S): 400 CAPSULE ORAL at 05:41

## 2021-03-11 RX ADMIN — SODIUM CHLORIDE 3 MILLILITER(S): 9 INJECTION INTRAMUSCULAR; INTRAVENOUS; SUBCUTANEOUS at 21:28

## 2021-03-11 RX ADMIN — CLOPIDOGREL BISULFATE 75 MILLIGRAM(S): 75 TABLET, FILM COATED ORAL at 13:47

## 2021-03-11 RX ADMIN — CHLORHEXIDINE GLUCONATE 1 APPLICATION(S): 213 SOLUTION TOPICAL at 05:40

## 2021-03-11 RX ADMIN — Medication 81 MILLIGRAM(S): at 13:47

## 2021-03-11 RX ADMIN — LISINOPRIL 20 MILLIGRAM(S): 2.5 TABLET ORAL at 14:56

## 2021-03-11 NOTE — PROGRESS NOTE ADULT - ASSESSMENT
75 year old female PMHx HTN, HLD, CAD s/p cardiac cath with KILEY mLAD placement on 1/29/21, aortic stenosis s/p TAVR Core Valve 3/10, c/b CHB now with L groin TVP. EP consulted for PPM placement.    1. Aortic stenosis s/p TAVR 3/10  2. Post op SR, CHB, LBBB, junctional escape in the 50's with resolution and high risk for reoccurrence      - continue telemetry monitoring  - Keep K+> 4, MG++> 2  - left femoral TVP in place, settings: VVI, HR initially at 30 increased to 80, now at 50 bpm, Sensitivity 2, Output: 10   - maintain NPO for PPM today   - Active type and screen   - Weight based vancomycin 15 mg/kg 1 hour prior to PPM procedure     [id number]

## 2021-03-11 NOTE — PROGRESS NOTE ADULT - ATTENDING COMMENTS
I have personally seen, examined and participated in the care of this patient. I have reviewed all pertinent clinical information, including history, physical exam, plan and the resident's note. I agree with the resident's note with the following additions:    Multiple medical problems including severe AS s/p TAVR complicated by complete heart block requiring trans-venous pacing  Off Levophed, no pressors or inotropes  Sinus currently - TVP at VVI back-up at 40 bpm  EP consulted, for permanent pacemaker today given long amount of time in complete heart block  Fluid challenge to wean pressor  O2 sats mid 90s on nasal cannula  NPO post procedure - advance diet as tolerated  Normal renal function, compensated on exam  H/H low but acceptable post procedure; femoral access sites do not have oozing or hematoma  Afebrile, continue Ancef for edwin-procedure antibiotics  Sugars controlled  Femoral TVP 3/10    The patient required critical care management and I personally provided 35 minutes of non-continuous care to the patient, excluding separate procedures, in addition to discussing the patient and plan at length with the ICU staff and helping coordinate care. I have personally seen, examined and participated in the care of this patient. I have reviewed all pertinent clinical information, including history, physical exam, plan and the resident's note. I agree with the resident's note with the following additions:    Multiple medical problems including severe AS s/p TAVR complicated by complete heart block requiring trans-venous pacing  Off Levophed, no pressors or inotropes  Sinus currently - TVP at VVI back-up at 40 bpm  EP consulted, for permanent pacemaker today given long amount of time in complete heart block  ASA/Plavix post TAVR  O2 sats mid 90s on nasal cannula  NPO for PPM  Normal renal function, compensated on exam  H/H low but acceptable; femoral access sites do not have oozing or hematoma  Afebrile, continue Ancef for edwin-procedure antibiotics  Sugars controlled  Femoral TVP 3/10 - remove post PPM implant    The patient required critical care management and I personally provided 35 minutes of non-continuous care to the patient, excluding separate procedures, in addition to discussing the patient and plan at length with the ICU staff and helping coordinate care. I have personally seen, examined and participated in the care of this patient. I have reviewed all pertinent clinical information, including history, physical exam, plan and the resident's note. I agree with the resident's note with the following additions:    Multiple medical problems including severe AS s/p TAVR complicated by complete heart block requiring trans-venous pacing  Off Levophed, no pressors or inotropes  Sinus currently - TVP at VVI back-up at 40 bpm  EP consulted, for permanent pacemaker today given long amount of time in complete heart block  ASA/Plavix post TAVR  O2 sats mid 90s on nasal cannula  NPO for PPM  Normal renal function, compensated on exam  H/H low but acceptable; femoral access sites do not have oozing or hematoma  Afebrile, continue Ancef for edwin-procedure antibiotics  Sugars controlled  Gabapentin and Tylenol for chronic pain/spinal stenosis  Femoral TVP 3/10 - remove post PPM implant    The patient required critical care management and I personally provided 35 minutes of non-continuous care to the patient, excluding separate procedures, in addition to discussing the patient and plan at length with the ICU staff and helping coordinate care.

## 2021-03-11 NOTE — CHART NOTE - NSCHARTNOTEFT_GEN_A_CORE
Name:  Noah      First Name: Maggie   	: 1945        Medical Record#:  [id number]                               Procedure performed:	    Dual Chamber Pacemaker Implant (54961)   		                   Procedure Date:  3/11/2021           : Chaim Jolley MD        Referring MD:   Dani Perez, Donta Madrid MD, Josesito Paula MD                Preprocedure Indications and Diagnoses:     1. Symptomatic bradycardia due to 3rd Degree AV block (I44.2)  requiring temporary pacing with return of conduction with persistent MT prolongation (280 ms) post TAVR (CoreValve)  2. Syncope:  No	  3. LVEF:   55%+  4. NYHA Class:  II-III pre TAVR    Postprocedure Diagnoses:    Successful implantation of a dual chamber pacemaker in the upper RV septum but unable to achieve either His or LBB pacing    1. Pacemaker Generator:  : Medtronic Model#   [id number]      Serial Number# [id number]  MRI Compatible ?  Position:    Left Suprapectoral with Tyrx pouch  2. Lead 1:   Access:   Left cephalic   Position:  RAA    : Medtronic Model#   4076-52  active-fix    Serial Number# [id number]  3. Lead 2:   Access:   Left cephalic   Position:  RV septum  : Medtronic Model#   [id number] active fix      Serial Number# [id number]    Complications:  None    EBL:   <20cc    Fluoroscopy:    8.9  minutes; 60mGy; [id number]    Anesthesia: 2% local lidocaine with epi. MAC by EP anesthesia  Preprocedure antibiotics:  1 gm Vancomycin IV, 2 gm IV cefazolin    Brief History:  75 year old woman with symptomatic 3rd degree heart block that persisted 18 hours post TAVR and still have evidence for prolonged AV conduction and required temporary pacing for an extended period of time          Procedure Details:    The patient was in the post absorptive state after informed consent was obtained and prepped and draped in the usual manner. Formal time out was performed.     2% lidocaine with epinephrine was infiltrated along the left deltopectoral groove.   A 5 cm diagonal  Incision was made with a 10 blade. Using sharp and blunt dissection and electrocautery, the pectoralis major and deltopectoral groove were exposed. Further dissection exposed the cephalic vein which deemed large enough for passage of leads and isolated with 0-0 silk. Two 7F Safe Sheaths were placed with a retained wire approach. The His sheath was used to place the 3830 lead deep in the high septum with a paced QRS of 140 ms after the lead was screwed in, despite sampling several sites.  The atrial lead was easily placed in the RAA appendage  All stylets were removed. The leads were secured to the pectoralis major fascia using 0-0 silk around the suture sleeves.    Lead testing:    Lead			Amplitude (mV)	     Pacing threshold       Impedance (ohms)     RA				1.5	             0.5/0.4		    510	  RV				9.6	             1.0/0.4	                 703	    A pocket was then made above the pectoralis major.  Bleeding sites were cauterized. The pocket was irrigated with antibiotic solution. The leads were connected to the generator and set screws were tightened. The generator was placed in a Tyrx pouch and placed in the pocket. The leads passed the tug test. The pocket was then closed with 2-0 Vicryl, 3-0 Vicryl , and 4-0 Monocryl.   Steri-strips were used. Fluoroscopy showed excellent lead position and generator. Sponge and needle counts were correct X 2.    Device programmed to DDD   Lower rate (ppm): 60   Upper rate (ppm):  110. The patient tolerated the procedure well. Name:  Noah      First Name: Maggie   	: 1945        Medical Record#:  [id number]                               Procedure performed:	    Dual Chamber Pacemaker Implant (94165)   		                   Procedure Date:  3/11/2021           : Chaim Jolley MD        Referring MD:   Dani Perez MD, Donta Madrid MD, Josesito Paula MD                Preprocedure Indications and Diagnoses:     1. Symptomatic bradycardia due to 3rd Degree AV block (I44.2)  requiring temporary pacing with return of conduction with persistent NE prolongation (280 ms) post TAVR (CoreValve)  2. Syncope:  No	  3. LVEF:   55%+  4. NYHA Class:  II-III pre TAVR    Postprocedure Diagnoses:    Successful implantation of a dual chamber pacemaker in the upper RV septum but unable to achieve either His or LBB pacing    1. Pacemaker Generator:  : Medtronic Model#   [id number]      Serial Number# [id number]  MRI Compatible ?  Position:    Left Suprapectoral with Tyrx pouch  2. Lead 1:   Access:   Left cephalic   Position:  RAA    : Medtronic Model#   4076-52  active-fix    Serial Number# [id number]  3. Lead 2:   Access:   Left cephalic   Position:  RV septum  : Medtronic Model#   3830 active fix      Serial Number# [id number]    Complications:  None    EBL:   <20cc    Fluoroscopy:    8.9  minutes; 60mGy; [id number]    Anesthesia: 2% local lidocaine with epi. MAC by EP anesthesia  Preprocedure antibiotics:  1 gm Vancomycin IV, 2 gm IV cefazolin    Brief History:  75 year old woman with symptomatic 3rd degree heart block that persisted 18 hours post TAVR and still have evidence for prolonged AV conduction and required temporary pacing for an extended period of time          Procedure Details:    The patient was in the post absorptive state after informed consent was obtained and prepped and draped in the usual manner. Formal time out was performed.     2% lidocaine with epinephrine was infiltrated along the left deltopectoral groove.   A 5 cm diagonal  Incision was made with a 10 blade. Using sharp and blunt dissection and electrocautery, the pectoralis major and deltopectoral groove were exposed. Further dissection exposed the cephalic vein which deemed large enough for passage of leads and isolated with 0-0 silk. Two 7F Safe Sheaths were placed with a retained wire approach. The His sheath was used to place the 3830 lead deep in the high septum with a paced QRS of 140 ms after the lead was screwed in, despite sampling several sites.  The atrial lead was easily placed in the RAA appendage  All stylets were removed. The leads were secured to the pectoralis major fascia using 0-0 silk around the suture sleeves.    Lead testing:    Lead			Amplitude (mV)	     Pacing threshold       Impedance (ohms)     RA				1.5	             0.5/0.4		    510	  RV				9.6	             1.0/0.4	                 703	    A pocket was then made above the pectoralis major.  Bleeding sites were cauterized. The pocket was irrigated with antibiotic solution. The leads were connected to the generator and set screws were tightened. The generator was placed in a Tyrx pouch and placed in the pocket. The leads passed the tug test. The pocket was then closed with 2-0 Vicryl, 3-0 Vicryl , and 4-0 Monocryl.   Steri-strips were used. Fluoroscopy showed excellent lead position and generator. Sponge and needle counts were correct X 2.    Device programmed to DDD   Lower rate (ppm): 60   Upper rate (ppm):  110. The patient tolerated the procedure well.

## 2021-03-11 NOTE — CHART NOTE - NSCHARTNOTEFT_GEN_A_CORE
Padua Prediction Score for VTE Risk within 24hours of admission:    Active malignancy:                                                    [  ] YES +3, [  x] NO   Previous VTE (Excluding Superficial Vein Thrombosis): [  ] YES +3, [  x] NO  Reduced mobility:                                                     [x  ] YES +3, [  ] NO  Already known thrombophilic condition:                     [  ] YES +3, [x  ] NO  Recent (</=1 month trauma and/or surgery):             [ x ] YES +2, [  ] NO  Elderly age (>/=70):                                                  [ x ] YES +1, [  ] NO  Heart and/or Respiratory Failure:                               [  ] YES +1, [ x ] NO   Acute MI and/or ischemic CVA:                                  [  ] YES +1, [ x ] NO   Acute infection and/or rheumatologic disorder:          [  ] YES +1, [  x] NO   BMI>/= 30:                                                              [  ] YES +1, [ x ] NO   Ongoing hormonal treatment:                                   [  ] YES +1, [ x ] NO    Total Score: [  6]  points    [  ] Padua Score <  3: Low Risk of VTE         - Chemical Thromboprophylaxis should be considered on case-by-case basis  [ x ] Padua Score >/= 4: High Risk of VTE         - Chemical Thromboprophylaxis is recommended for nonpregnant patients without contraindications (Major bleeding, thrombocytopenia) who are >/=  18 years of age                         VTE Prophylaxis Recommendations:  Mechanical Pneumatic Compression Devices                                [x  ]  Yes,  [  ] No, Contraindicated    Chemical VTE Prophylaxis (Heparin/ Lovenox/ Fondaparinux)        [   ] Yes,  [  ] No              [ x] Contraindicated, because going for PPM in AM. Will use SCDs for now               [ ] Already receiving Systemic Anticoagulation

## 2021-03-11 NOTE — PROGRESS NOTE ADULT - ASSESSMENT
74 y/o with hx of HTN, HLD, CAD with KILEY mLAD 1/29/21, LS compression fracture, neuropathy, B/L lower extremity weakness with severe aortic stenosis, admitted to CICU s/p TAVR Core Valve c/b CHB with left femoral TVP in place and hypotension requiring levophed. Hypotension likely associated with AV mallory asynchrony and possible hypovolemia in s/o edwin op NPO. Pt was given fluid bolus and pacing rate adjusted with resolution of hypotension. Levophed was discontinued shortly after arrival to CICU. Pt underwent PPM placement 3/11 by EP and medically stable to be transferred to the floors.     3/11 s/p PPM , transferred to floor. TTE completed, pending read. Daily EKG.  76 y/o with hx of HTN, HLD, CAD with KILEY mLAD 1/29/21, LS compression fracture, neuropathy, B/L lower extremity weakness with severe aortic stenosis, admitted to CICU s/p TAVR Core Valve c/b CHB with left femoral TVP in place and hypotension requiring levophed. Hypotension likely associated with AV mallory asynchrony and possible hypovolemia in s/o edwin op NPO. Pt was given fluid bolus and pacing rate adjusted with resolution of hypotension. Levophed was discontinued shortly after arrival to CICU. Pt underwent PPM placement 3/11 by EP and medically stable to be transferred to the floors.     3/10 S/P TAVR, complete heart block , TVP in place, requiring vasopressors.   3/11 s/p PPM , transferred to floor. TTE completed, pending read. Daily EKG.  76 y/o with hx of HTN, HLD, CAD with KILEY mLAD 1/29/21, LS compression fracture, neuropathy, B/L lower extremity weakness with severe aortic stenosis, admitted to CICU s/p TAVR Core Valve c/b CHB with left femoral TVP in place and hypotension requiring levophed. Hypotension likely associated with AV mallory asynchrony and possible hypovolemia in s/o edwin op NPO. Pt was given fluid bolus and pacing rate adjusted with resolution of hypotension. Levophed was discontinued shortly after arrival to CICU. Pt underwent PPM placement 3/11 by EP and medically stable to be transferred to the floors.     3/10 S/P TAVR, complete heart block , LBBB,  TVP in place, requiring vasopressors briefly.   3/11 s/p PPM , transferred to floor. TTE completed, pending read. Daily EKG.

## 2021-03-11 NOTE — PROGRESS NOTE ADULT - SUBJECTIVE AND OBJECTIVE BOX
*****Structural Heart Team*****    Subjective:    The patient is resting in a bed, with a complaint of feeling fatigued and uncomfortable. She states she was not able to fall asleep last night. She has a history of compression fractures in her spine, so she was unable to get comfortable. She came to the ICU with a TVP wire in place due to pacing for underlying complete heart block. She denies any SOB,CP or dizziness.    PAST MEDICAL & SURGICAL HISTORY:  Neuropathy    Falls    Transient ischemic attack (TIA)  Jan 2014- with residual weakness on right leg    OAB (overactive bladder)    Type 2 diabetes mellitus  diet controlled    Compression fracture of spine    Aortic stenosis    Compression fracture of spine  2015 lumbar    OA (osteoarthritis)    CAD (coronary artery disease)    HLD (hyperlipidemia)    Hypertension    H/O oral surgery    History of cataract surgery    History of coronary artery stent placement  KILEY to mLAD (Jan 2021)          T(C): 37 (03-11-21 @ 07:00), Max: 37 (03-11-21 @ 07:00)  HR: 84 (03-11-21 @ 08:00) (48 - 88)  BP: 138/41 (03-11-21 @ 06:26) (75/35 - 138/41)  RR: 24 (03-11-21 @ 08:00) (11 - 24)  SpO2: 100% (03-11-21 @ 08:00) (95% - 100%)  Wt(kg): --  03-10 @ 07:01  -  03-11 @ 07:00  --------------------------------------------------------  IN: 1891.2 mL / OUT: 2300 mL / NET: -408.8 mL    03-11 @ 07:01  -  03-11 @ 08:57  --------------------------------------------------------  IN: 20 mL / OUT: 0 mL / NET: 20 mL      MEDICATIONS  (STANDING):  aspirin enteric coated 81 milliGRAM(s) Oral daily  atorvastatin 10 milliGRAM(s) Oral at bedtime  chlorhexidine 4% Liquid 1 Application(s) Topical <User Schedule>  clopidogrel Tablet 75 milliGRAM(s) Oral daily  gabapentin 300 milliGRAM(s) Oral three times a day  sodium chloride 0.9% lock flush 3 milliLiter(s) IV Push every 8 hours  sodium chloride 0.9%. 1000 milliLiter(s) (10 mL/Hr) IV Continuous <Continuous>    MEDICATIONS  (PRN):  benzocaine 15 mG/menthol 3.6 mG (Sugar-Free) Lozenge 1 Lozenge Oral every 2 hours PRN Sore Throat      Review of Symptoms:  Constitutional: Awake, Alert, Follows commands  Respiratory: Denies  Cardiac: Denies CP, Denies Palpitations  Gastrointestinal: Denies Pain, Denies N/V, tolerating po intake  Vascular: Negative  Extremities: No Edema, No joint pain or swelling  Neurological: Negative  Endocrine: No heat or cold intolerance, No excessive thirst  Heme/Onc: Negative    Exam:  General: A/Ox3, COLE, NAD, Mildly fatigued  HEENT: Supple, No JVD, Trachea midline, no masses  Pulmonary: CTAB, = Chest Excursion, no accessory muscle use  Cor: S1S2, RRR, I/VI ABDELRAHMAN, No gallop or Rub  ECG: SR  Groin/Wound: B/L soft, no hematoma, no ecchymosis, Left femoral TVP wire in place.  Gastrointestinal: Soft, NT/ND, + Bowel Sounds  Neuro: = motor and sensory B/L, No focal deficits  Vascular: 1+ Pulses B/L, No edema  Extremities:  No joint pain or swelling, + Back pain  Skin: Warm/Dry/Normal color, Normal turgor, no rashes                          9.4    6.39  )-----------( 188      ( 11 Mar 2021 05:08 )             28.3   03-11    137  |  103  |  11  ----------------------------<  98  4.5   |  21<L>  |  0.45<L>    Ca    8.8      11 Mar 2021 05:08  Phos  2.9     03-11  Mg     2.1     03-11    TPro  5.6<L>  /  Alb  3.7  /  TBili  0.6  /  DBili  x   /  AST  19  /  ALT  6<L>  /  AlkPhos  49  03-11  PT/INR - ( 11 Mar 2021 05:08 )   PT: 11.8 sec;   INR: 0.98 ratio         PTT - ( 11 Mar 2021 05:08 )  PTT:29.9 sec    Imaging Reviewed:    Post Op TTE: Pending        Assesment/Plan:    75 Female S/P TAVR for Severe Symptomatic Aortic Stenosis with a 26 mm CoreValve Evolut Pro, New Complete Heart Block, Essential HTN  1.) S/P TAVR: ASA 81 mg po daily, Plavix 75 mg po daily, Continue to Monitor Groins. Ambulate as tolerated.  Will Follow up on POD #1 TTE when complete.  2.) Complete Heart Block: Patient still with TVP wire secondary to post op CHB which required pacing. She is currently in SR and not pacing. TPM set at VVI 50. She was evaluated by the EP service, and due to the risk of recurrent CHB, she will undergo PPM today.  3.) Essential HTN: Restarted patient's Amlodipine 5 mg po daily. If she tolerates restarting Amlodipine, her Ramipril 1.25mg po qHS should be restarted.  4.) D/C Rosa. Once TPM removed, ambulate patient.  5.) Discharge Plan: Patient is to follow up with Dr. Molina in 1 week post discharge. She should then follow up with the [facility]  in 30 days, with a repeat Transthoracic Echo to be done at that visit.    JASPER Samuel  [id number]

## 2021-03-11 NOTE — PROGRESS NOTE ADULT - SUBJECTIVE AND OBJECTIVE BOX
Interval Hx; Events Overnight:  transferred to floor.      SUBJECTIVE: " I feel ok "    LABS:                9.4                  137  | 21   | 11           6.39  >-----------< 188     ------------------------< 98                    28.3                 4.5  | 103  | 0.45                                         Ca 8.8   Mg 2.1   Ph 2.9        PT/INR - ( 11 Mar 2021 05:08 )   PT: 11.8 sec;   INR: 0.98 ratio         PTT - ( 11 Mar 2021 05:08 )  PTT:29.9 sec    VITAL SIGNS    Telemetry: SR       Daily Height in cm: 165.1 (11 Mar 2021 09:00)    Daily Weight in k.7 (11 Mar 2021 06:00)      Vital Signs Last 24 Hrs  T(C): 36.6 (21 @ 18:41), Max: 37 (21 @ 07:00)  T(F): 97.9 (21 @ 18:41), Max: 98.6 (21 @ 07:00)  HR: 100 (21 @ 18:41) (78 - 100)  BP: 100/61 (21 @ 18:41) (100/61 - 160/68)  RR: 20 (21 @ 18:41) (12 - 25)  SpO2: 99% (21 @ 18:41) (99% - 100%)             I&O's Detail    10 Mar 2021 07:01  -  11 Mar 2021 07:00  --------------------------------------------------------  IN:    IV PiggyBack: 1050 mL    Norepinephrine: 21.2 mL    Oral Fluid: 720 mL    sodium chloride 0.9%: 100 mL  Total IN: 1891.2 mL    OUT:    Voided (mL): 2300 mL  Total OUT: 2300 mL    Total NET: -408.8 mL      11 Mar 2021 07:01  -  11 Mar 2021 19:53  --------------------------------------------------------  IN:    Oral Fluid: 680 mL    sodium chloride 0.9%: 30 mL  Total IN: 710 mL    OUT:    Voided (mL): 480 mL  Total OUT: 480 mL    Total NET: 230 mL                    GLUCOSE  CAPILLARY BLOOD GLUCOSE                      PHYSICAL EXAM      General: NAD, well appearing, in no distress  Neurology: A&O x3, non focal, no neuro deficits. Moves all extremities to command.  CV : s1 s2 RRR, no murmurs, gallops, clicks.   L CW s/p PPM site on hematoma, no bleeding  Lungs: clear to auscultation  Abdomen: soft, nontender, nondistended, positive bowel sounds  :    voiding prima fit       Extremities:    trace  edema. + pedal pulses      Incision: b/l groin sites stable, no bleeding no hematoma  Skin: intact, no lesions        MEDICATIONS  acetaminophen   Tablet .. 650 milliGRAM(s) Oral every 6 hours  amLODIPine   Tablet 5 milliGRAM(s) Oral daily  aspirin enteric coated 81 milliGRAM(s) Oral daily  atorvastatin 10 milliGRAM(s) Oral at bedtime  benzocaine 15 mG/menthol 3.6 mG (Sugar-Free) Lozenge 1 Lozenge Oral every 2 hours PRN  cephalexin 500 milliGRAM(s) Oral every 12 hours  clopidogrel Tablet 75 milliGRAM(s) Oral daily  gabapentin 300 milliGRAM(s) Oral three times a day  lisinopril 20 milliGRAM(s) Oral daily  sodium chloride 0.9% lock flush 3 milliLiter(s) IV Push every 8 hours

## 2021-03-11 NOTE — PROGRESS NOTE ADULT - ASSESSMENT
Assessment:   76 y/o with hx of HTN, HLD, CAD with KILEY mLAD 1/29/21, LS compression fracture, neuropathy, B/L lower extremity weakness with severe aortic stenosis, [facility] Core Valve c/b CHB with left femoral TVP in place plan for PPM implantation.      Plan:  ====================== NEUROLOGY=====================  A&O x3, Nonfocal  - continue to monitor neuro status per ICU protocol.   - C/w Gabapentin 300mg TID for neuropathic pain    gabapentin 300 milliGRAM(s) Oral three times a day  ==================== RESPIRATORY======================  Comfortable on room air, SpO2 100%  - Continue to monitor SpO2 via pulse oximetry  - Encourage bedside spirometry     ====================CARDIOVASCULAR==================  AS c/b CHB  - S/P TAVR   - Left femoral TVP in place. HR 80 with MV 10. Currently completely pacer dependent  - Scheduled for PPM tomorrow AM with Dr. Jolley. NPO post midnight     Hx of CAD  - C/w DAPT: ASA and Plavix  - Monitor continuous telemetry    Hypotension  - Weaned off Levophed gtt  - Holding home antihypertensives. consider re-initiation tomorrow if remains HD stable.   - Invasive hemodynamic monitoring, assess perfusion indices.     norepinephrine Infusion 0.07 MICROgram(s)/kG/Min (8.61 mL/Hr) IV Continuous <Continuous>  aspirin enteric coated 81 milliGRAM(s) Oral daily  clopidogrel Tablet 75 milliGRAM(s) Oral daily  ===================HEMATOLOGIC/ONC ===================  Anemia, H/H 9.5/25.4.   - Continue to monitor hemoglobin and hematocrit levels.     ===================== RENAL =========================  No active issues   - Continue to monitor I/Os, BUN/Creatinine, and urine output.   - Goal net negative fluid balance. Replete lytes PRN. Keep K> 4 and Mg >2.     ==================== GASTROINTESTINAL===================  NPO at this time.  Passed bedside S/S  Monitor for flatulence     potassium chloride    Tablet ER 20 milliEquivalent(s) Oral every 2 hours  sodium chloride 0.9% lock flush 3 milliLiter(s) IV Push every 8 hours  =======================    ENDOCRINE  =====================  Bgl controlled  - monitor glucose for need to initiate sliding scale.   ========================INFECTIOUS DISEASE================  Afebrile, WBC within normal limits.   - Monitor temperature and trend WBC.  - C/w post-op abx coverage w/ Cefazolin  - Vanc x1 dose pre-op for PPM ordered for am Assessment:   76 y/o with hx of HTN, HLD, CAD with KILEY mLAD 1/29/21, LS compression fracture, neuropathy, B/L lower extremity weakness with severe aortic stenosis, admitted to CICU s/p TAVR Core Valve c/b CHB with left femoral TVP in place plan for PPM implantation.      Plan:  ====================== NEUROLOGY=====================  A&O x3, Nonfocal  - continue to monitor neuro status per ICU protocol.   - C/w Gabapentin 300mg TID for neuropathic pain    ==================== RESPIRATORY======================  Comfortable on room air, SpO2 100%  - Continue to monitor SpO2 via pulse oximetry  - Encourage bedside spirometry     ====================CARDIOVASCULAR==================  AS c/b CHB  - S/P TAVR   - Left femoral TVP in place. Sinus rhythm with intermittent pacing overnight on tele  - Scheduled for PPM 3/11 with Dr. Jolley.   - Advance diet as tolerated after PPM  - Monitor on telemetry   - repeat ECHO as per structural cards     Hx of CAD  - C/w DAPT: ASA and Plavix    Hypotension  - Weaned off Levophed gtt  - Resume home antihypertensive given -160  - Invasive hemodynamic monitoring, assess perfusion indices.     ===================HEMATOLOGIC/ONC ===================  Anemia, H/H 9.5/25.4.   - Continue to monitor hemoglobin and hematocrit levels.     ===================== RENAL =========================  No active issues   - Continue to monitor I/Os, BUN/Creatinine, and urine output.   - Goal net negative fluid balance. Replete lytes PRN. Keep K> 4 and Mg >2.     ==================== GASTROINTESTINAL===================  - Passed bedside S/S  - Monitor for flatulence   - Advance diet as tolerated     =======================    ENDOCRINE  =====================  Bgl controlled  - monitor glucose for need to initiate sliding scale.   ========================INFECTIOUS DISEASE================  Afebrile, WBC within normal limits.   - Monitor temperature and trend WBC.

## 2021-03-11 NOTE — PROGRESS NOTE ADULT - SUBJECTIVE AND OBJECTIVE BOX
24H hour events: feeling better than yesterday, blood pressure stable     MEDICATIONS:  amLODIPine   Tablet 5 milliGRAM(s) Oral daily  aspirin enteric coated 81 milliGRAM(s) Oral daily  clopidogrel Tablet 75 milliGRAM(s) Oral daily  lisinopril 20 milliGRAM(s) Oral daily  gabapentin 300 milliGRAM(s) Oral three times a day  atorvastatin 10 milliGRAM(s) Oral at bedtime  benzocaine 15 mG/menthol 3.6 mG (Sugar-Free) Lozenge 1 Lozenge Oral every 2 hours PRN  sodium chloride 0.9% lock flush 3 milliLiter(s) IV Push every 8 hours    REVIEW OF SYSTEMS:  See HPI, otherwise ROS negative.    PHYSICAL EXAM:  T(C): 37 (03-11-21 @ 07:00), Max: 37 (03-11-21 @ 07:00)  HR: 86 (03-11-21 @ 10:00) (48 - 88)  BP: 138/41 (03-11-21 @ 06:26) (75/35 - 138/41)  RR: 13 (03-11-21 @ 10:00) (11 - 24)  SpO2: 100% (03-11-21 @ 10:00) (95% - 100%)    I&O's Summary    10 Mar 2021 07:01  -  11 Mar 2021 07:00  --------------------------------------------------------  IN: 1891.2 mL / OUT: 2300 mL / NET: -408.8 mL    11 Mar 2021 07:01  -  11 Mar 2021 10:19  --------------------------------------------------------  IN: 30 mL / OUT: 480 mL / NET: -450 mL    Appearance: lying in bed Alert. NAD	  Cardiovascular: +S1S2 RRR + systolic murmur   Respiratory: CTA B/L	  Psychiatry: A & O x 3, Mood & affect appropriate  Gastrointestinal:  Soft, NT. ND. +BS	  Skin: Right groin no hematoma, left groin temp wire 	  Neurologic: Non-focal  Extremities: No edema BLE  Vascular: Peripheral pulses palpable 2+ bilaterally    LABS:	 	    CBC Full  -  ( 11 Mar 2021 05:08 )  WBC Count : 6.39 K/uL  Hemoglobin : 9.4 g/dL  Hematocrit : 28.3 %  Platelet Count - Automated : 188 K/uL  Mean Cell Volume : 91.0 fl  Mean Cell Hemoglobin : 30.2 pg  Mean Cell Hemoglobin Concentration : 33.2 gm/dL  Auto Neutrophil # : 4.75 K/uL  Auto Lymphocyte # : 0.93 K/uL  Auto Monocyte # : 0.60 K/uL  Auto Eosinophil # : 0.06 K/uL  Auto Basophil # : 0.03 K/uL  Auto Neutrophil % : 74.3 %  Auto Lymphocyte % : 14.6 %  Auto Monocyte % : 9.4 %  Auto Eosinophil % : 0.9 %  Auto Basophil % : 0.5 %    03-11    137  |  103  |  11  ----------------------------<  98  4.5   |  21<L>  |  0.45<L>  03-10    137  |  101  |  15  ----------------------------<  139<H>  3.4<L>   |  21<L>  |  0.56    Ca    8.8      11 Mar 2021 05:08  Ca    8.3<L>      10 Mar 2021 17:45  Phos  2.9     03-11  Phos  3.0     03-10  Mg     2.1     03-11  Mg     1.9     03-10    TPro  5.6<L>  /  Alb  3.7  /  TBili  0.6  /  DBili  x   /  AST  19  /  ALT  6<L>  /  AlkPhos  49  03-11  TPro  5.1<L>  /  Alb  3.1<L>  /  TBili  0.4  /  DBili  x   /  AST  13  /  ALT  8<L>  /  AlkPhos  49  03-10    TELEMETRY: SR 70's occasional v pacing   	  RADIOLOGY:  < from: Xray Chest 1 View- PORTABLE-Urgent (Xray Chest 1 View- PORTABLE-Urgent .) (03.10.21 @ 18:41) >    EXAM:  XR CHEST PORTABLE URGENT 1V                          PROCEDURE DATE:  03/10/2021    INTERPRETATION:  CLINICAL INFORMATION: Left femoral placement.    A frontal view of the chest was obtained.    Comparison: CT chest 1/10/2014.    IMPRESSION:    The mediastinal cardiac silhouette is unremarkable. TAVR. Left femoral approach pacer wire overlies the left heart.    The lungs are clear.    No acute osseous finding.      < from: Transthoracic Echocardiogram (03.10.21 @ 14:11) >  ------------------------------------------------------------------------  PROCEDURE: Transthoracic echocardiogram with 2-D, M-Mode  and complete spectral and color flow Doppler.  INDICATION: Nonrheumatic aortic (valve) stenosis (I35.0)  ------------------------------------------------------------------------  MEASUREMENTS: (See below)  ------------------------------------------------------------------------  OBSERVATIONS:  Mitral Valve: There is mitral annular calcification. There  is no significant mitral regurgitation.  Aortic Root: Normal aortic root visually.  Aortic Valve: #26mm Evolut Pro+ THV.  The valve is well  seated with normal function.  The peak/mean gradients= 10/6mmHg with a DVI= 0.69. Peak  transaortic valve gradient equals 10 mm Hg, mean  transaortic valve gradient equals 6 mm Hg, aortic valve  velocity time integral equals 42 cm, estimated aortic valve  area equals 2.8 sqcm. There is a mild paravalvular aortic  regurgitation jet originating from the posterior aspect of  the THV.  There is no intravalvular regurgitation seen.  Peak left ventricular outflow tract gradient equals 4 mm  Hg, LVOT velocity time integral equals 29 cm.  Left Atrium: Left atrium not well visualized.  Left Ventricle: Hyperdynamic left ventricular systolic  function with systolic intracavitary obstruction (peak  resting gradient under anesthesia= 16mmHg).  The LVEF about  70%. Normal left ventricular size with concentric  hypertrophy.  Right Heart: Right atrium not well visualized. Normal right  ventricular size and function. Tricuspid valve not well  visualized. Pulmonic valve not well visualized, probably  normal.  Pericardium/PleuraNormal pericardium with no pericardial  effusion.  Hemodynamic: The IVC was not evaluated.  ------------------------------------------------------------------------  CONCLUSIONS:  Intra-procedural TTE for TAVR under MAC.  Brief pre-procedural imaging revealed severe aortic  stenosis with increased left ventricular function.  Please  see study from 12/29/2020 for baseline as this appears  visually unchanged.  The valve was pre-dilated under fluoroscopic guidance.  A  #26mm Evolut Pro+ THV was positioned and deployed under  flouroscopic guidance.  Imaging afterwards revealed a well  seated valve with mild paravalvular aortic regurgitation on  difficult imaging.  Fluoroscopically there appeared to be  an infolding and the valve was post-dilated under  fluoroscopy.  Imaging afterwards revealed mild paravalvular  regurgitation.  There was no intravalvular regurgitation  seen.  Images were reviewed with the interventional/surgical team  at the time of the study.  ------------------------------------------------------------------------  PROCEDURE DESCRIPTION: Transthoracic echocardiogram with  2-D, M-Mode and complete spectral and color flow Doppler.  ------------------------------------------------------------------------  ECHOCARDIOGRAPHIC EXAMINATION:  HR and BP:  HR: 42 bpm  BP: 150/74  BSA: 1.73  ------------------------------------------------------------------------  COLOR FLOW and SPECIAL DOPPLER:  LVOT:  LVOT Velocity: .9 M/sec  LVOT Diameter: 2.2 cm  LVOT Peak Gradient Rest: 3 mm Hg  ------------------------------------------------------------------------  ------------------------------------------------------------------------  Confirmed on  3/10/2021 - 18:09:10 by Mojgan Bazan M.D.  ------------------------------------------------------------------------

## 2021-03-11 NOTE — PROGRESS NOTE ADULT - SUBJECTIVE AND OBJECTIVE BOX
PATIENT:  RAFAEL NO  [id number]    CHIEF COMPLAINT:  Patient is a 75y old  Female who presents with a chief complaint of s/p TAVR (10 Mar 2021 20:47)      INTERVAL HISTORY/OVERNIGHT EVENTS:      REVIEW OF SYSTEMS:    Constitutional:     [ ] negative [ ] fevers [ ] chills [ ] weight loss [ ] weight gain  HEENT:                  [ ] negative [ ] dry eyes [ ] eye irritation [ ] postnasal drip [ ] nasal congestion  CV:                         [ ] negative  [ ] chest pain [ ] orthopnea [ ] palpitations [ ] murmur  Resp:                     [ ] negative [ ] cough [ ] shortness of breath [ ] dyspnea [ ] wheezing [ ] sputum [ ] hemoptysis  GI:                          [ ] negative [ ] nausea [ ] vomiting [ ] diarrhea [ ] constipation [ ] abd pain [ ] dysphagia   :                        [ ] negative [ ] dysuria [ ] nocturia [ ] hematuria [ ] increased urinary frequency  Musculoskeletal: [ ] negative [ ] back pain [ ] myalgias [ ] arthralgias [ ] fracture  Skin:                       [ ] negative [ ] rash [ ] itch  Neurological:        [ ] negative [ ] headache [ ] dizziness [ ] syncope [ ] weakness [ ] numbness  Psychiatric:           [ ] negative [ ] anxiety [ ] depression  Endocrine:            [ ] negative [ ] diabetes [ ] thyroid problem  Heme/Lymph:      [ ] negative [ ] anemia [ ] bleeding problem  Allergic/Immune: [ ] negative [ ] itchy eyes [ ] nasal discharge [ ] hives [ ] angioedema    [ ] All other systems negative  [ ] Unable to assess ROS because ________.    MEDICATIONS:  MEDICATIONS  (STANDING):  aspirin enteric coated 81 milliGRAM(s) Oral daily  atorvastatin 10 milliGRAM(s) Oral at bedtime  chlorhexidine 4% Liquid 1 Application(s) Topical <User Schedule>  clopidogrel Tablet 75 milliGRAM(s) Oral daily  gabapentin 300 milliGRAM(s) Oral three times a day  sodium chloride 0.9% lock flush 3 milliLiter(s) IV Push every 8 hours  sodium chloride 0.9%. 1000 milliLiter(s) (10 mL/Hr) IV Continuous <Continuous>  vancomycin  IVPB 1000 milliGRAM(s) IV Intermittent once    MEDICATIONS  (PRN):  benzocaine 15 mG/menthol 3.6 mG (Sugar-Free) Lozenge 1 Lozenge Oral every 2 hours PRN Sore Throat      ALLERGIES:  Allergies    Vicodin (Vomiting; Nausea)    Intolerances        OBJECTIVE:  ICU Vital Signs Last 24 Hrs  T(C): 36.5 (11 Mar 2021 06:26), Max: 36.8 (10 Mar 2021 17:00)  T(F): 97.7 (11 Mar 2021 06:26), Max: 98.2 (10 Mar 2021 17:00)  HR: 80 (11 Mar 2021 06:26) (48 - 88)  BP: 138/41 (11 Mar 2021 06:26) (75/35 - 138/41)  BP(mean): 79 (10 Mar 2021 19:30) (44 - 79)  ABP: 146/42 (11 Mar 2021 05:00) (98/36 - 174/50)  ABP(mean): 74 (11 Mar 2021 05:00) (58 - 88)  RR: 20 (11 Mar 2021 06:26) (11 - 20)  SpO2: 100% (11 Mar 2021 06:26) (95% - 100%)      Adult Advanced Hemodynamics Last 24 Hrs  CVP(mm Hg): --  CVP(cm H2O): --  CO: --  CI: --  PA: --  PA(mean): --  PCWP: --  SVR: --  SVRI: --  PVR: --  PVRI: --  CAPILLARY BLOOD GLUCOSE      POCT Blood Glucose.: 95 mg/dL (10 Mar 2021 12:33)  POCT Blood Glucose.: 68 mg/dL (10 Mar 2021 12:30)    CAPILLARY BLOOD GLUCOSE      POCT Blood Glucose.: 95 mg/dL (10 Mar 2021 12:33)    I&O's Summary    10 Mar 2021 07:01  -  11 Mar 2021 06:46  --------------------------------------------------------  IN: 1451.2 mL / OUT: 2000 mL / NET: -548.8 mL      Daily Height in cm: 165.1 (10 Mar 2021 14:14)    Daily     PHYSICAL EXAMINATION:  General: WN/WD NAD  HEENT: PERRLA, EOMI, moist mucous membranes  Neurology: A&Ox3, nonfocal, GREENE x 4  Respiratory: CTA B/L, normal respiratory effort, no wheezes, crackles, rales  CV: RRR, S1S2, no murmurs, rubs or gallops  Abdominal: Soft, NT, ND +BS, Last BM  Extremities: No edema, + peripheral pulses  Incisions:   Tubes:    LABS:  ABG - ( 11 Mar 2021 04:59 )  pH, Arterial: 7.48  pH, Blood: x     /  pCO2: 30    /  pO2: 126   / HCO3: 22    / Base Excess: -.7   /  SaO2: 99                                      9.4    6.39  )-----------( 188      ( 11 Mar 2021 05:08 )             28.3     03-11    137  |  103  |  11  ----------------------------<  98  4.5   |  21<L>  |  0.45<L>    Ca    8.8      11 Mar 2021 05:08  Phos  2.9     03-11  Mg     2.1     03-11    TPro  5.6<L>  /  Alb  3.7  /  TBili  0.6  /  DBili  x   /  AST  19  /  ALT  6<L>  /  AlkPhos  49  03-11    LIVER FUNCTIONS - ( 11 Mar 2021 05:08 )  Alb: 3.7 g/dL / Pro: 5.6 g/dL / ALK PHOS: 49 U/L / ALT: 6 U/L / AST: 19 U/L / GGT: x           PT/INR - ( 11 Mar 2021 05:08 )   PT: 11.8 sec;   INR: 0.98 ratio         PTT - ( 11 Mar 2021 05:08 )  PTT:29.9 sec            TELEMETRY:     EKG:     IMAGING:       PATIENT:  RAFAEL NO  [id number]    CHIEF COMPLAINT:  Patient is a 75y old  Female who presents with a chief complaint of s/p TAVR (10 Mar 2021 20:47)    INTERVAL HISTORY/OVERNIGHT EVENTS:  Overnight SBP improved to 140s, levophed discontinued around 10pm.   Pt otherwise reporting overall feeling fine, has chronic b/l LE neuropathy at baseline.     REVIEW OF SYSTEMS:    Constitutional:     [ ] negative [ ] fevers [ ] chills [ ] weight loss [ ] weight gain  HEENT:               [ ] negative [ ] dry eyes [ ] eye irritation [ ] postnasal drip [ ] nasal congestion  CV:                     [ ] negative  [ ] chest pain [ ] orthopnea [ ] palpitations [ ] murmur  Resp:                  [ ] negative [ ] cough [ ] shortness of breath [ ] dyspnea [ ] wheezing [ ] sputum [ ] hemoptysis  GI:                      [ ] negative [ ] nausea [ ] vomiting [ ] diarrhea [ ] constipation [ ] abd pain [ ] dysphagia   :                     [ ] negative [ ] dysuria [ ] nocturia [ ] hematuria [ ] increased urinary frequency  Musculoskeletal:   [ ] negative [ ] back pain [ ] myalgias [ ] arthralgias [ ] fracture  Skin:                    [ ] negative [ ] rash [ ] itch  Neurological:       [ ] negative [ ] headache [ ] dizziness [ ] syncope [ ] weakness [ ] numbness  Psychiatric:          [ ] negative [ ] anxiety [ ] depression  Endocrine:           [ ] negative [ ] diabetes [ ] thyroid problem  Heme/Lymph:      [ ] negative [ ] anemia [ ] bleeding problem  Allergic/Immune:  [ ] negative [ ] itchy eyes [ ] nasal discharge [ ] hives [ ] angioedema    [ ] All other systems negative  [ ] Unable to assess ROS because ________.    MEDICATIONS:  MEDICATIONS  (STANDING):  aspirin enteric coated 81 milliGRAM(s) Oral daily  atorvastatin 10 milliGRAM(s) Oral at bedtime  chlorhexidine 4% Liquid 1 Application(s) Topical <User Schedule>  clopidogrel Tablet 75 milliGRAM(s) Oral daily  gabapentin 300 milliGRAM(s) Oral three times a day  sodium chloride 0.9% lock flush 3 milliLiter(s) IV Push every 8 hours  sodium chloride 0.9%. 1000 milliLiter(s) (10 mL/Hr) IV Continuous <Continuous>  vancomycin  IVPB 1000 milliGRAM(s) IV Intermittent once    MEDICATIONS  (PRN):  benzocaine 15 mG/menthol 3.6 mG (Sugar-Free) Lozenge 1 Lozenge Oral every 2 hours PRN Sore Throat      ALLERGIES:  Allergies    Vicodin (Vomiting; Nausea)    Intolerances    OBJECTIVE:  ICU Vital Signs Last 24 Hrs  T(C): 36.5 (11 Mar 2021 06:26), Max: 36.8 (10 Mar 2021 17:00)  T(F): 97.7 (11 Mar 2021 06:26), Max: 98.2 (10 Mar 2021 17:00)  HR: 80 (11 Mar 2021 06:26) (48 - 88)  BP: 138/41 (11 Mar 2021 06:26) (75/35 - 138/41)  BP(mean): 79 (10 Mar 2021 19:30) (44 - 79)  ABP: 146/42 (11 Mar 2021 05:00) (98/36 - 174/50)  ABP(mean): 74 (11 Mar 2021 05:00) (58 - 88)  RR: 20 (11 Mar 2021 06:26) (11 - 20)  SpO2: 100% (11 Mar 2021 06:26) (95% - 100%)      Adult Advanced Hemodynamics Last 24 Hrs  CVP(mm Hg): --  CVP(cm H2O): --  CO: --  CI: --  PA: --  PA(mean): --  PCWP: --  SVR: --  SVRI: --  PVR: --  PVRI: --  CAPILLARY BLOOD GLUCOSE      POCT Blood Glucose.: 95 mg/dL (10 Mar 2021 12:33)  POCT Blood Glucose.: 68 mg/dL (10 Mar 2021 12:30)    CAPILLARY BLOOD GLUCOSE    POCT Blood Glucose.: 95 mg/dL (10 Mar 2021 12:33)    I&O's Summary    10 Mar 2021 07:01  -  11 Mar 2021 06:46  --------------------------------------------------------  IN: 1451.2 mL / OUT: 2000 mL / NET: -548.8 mL      Daily Height in cm: 165.1 (10 Mar 2021 14:14)    Daily     PHYSICAL EXAMINATION:  General: WN/WD NAD  HEENT: PERRLA, EOMI, moist mucous membranes  Neurology: A&Ox3, nonfocal, GREENE x 4  Respiratory: CTA B/L, normal respiratory effort, no wheezes, crackles, rales  CV: RRR, S1S2, no murmurs, rubs or gallops  Abdominal: Soft, NT, ND +BS, Last BM  Extremities: 1+b/l edema, + peripheral pulses  Right cath site clean dry without erythema/ palpable masses. Left TVP site clean dry without erythema/ palpable masses.   Lines: right radial a-line     LABS:  ABG - ( 11 Mar 2021 04:59 )  pH, Arterial: 7.48  pH, Blood: x     /  pCO2: 30    /  pO2: 126   / HCO3: 22    / Base Excess: -.7   /  SaO2: 99                              9.4    6.39  )-----------( 188      ( 11 Mar 2021 05:08 )             28.3     03-11    137  |  103  |  11  ----------------------------<  98  4.5   |  21<L>  |  0.45<L>    Ca    8.8      11 Mar 2021 05:08  Phos  2.9     03-11  Mg     2.1     03-11    TPro  5.6<L>  /  Alb  3.7  /  TBili  0.6  /  DBili  x   /  AST  19  /  ALT  6<L>  /  AlkPhos  49  03-11    LIVER FUNCTIONS - ( 11 Mar 2021 05:08 )  Alb: 3.7 g/dL / Pro: 5.6 g/dL / ALK PHOS: 49 U/L / ALT: 6 U/L / AST: 19 U/L / GGT: x           PT/INR - ( 11 Mar 2021 05:08 )   PT: 11.8 sec;   INR: 0.98 ratio         PTT - ( 11 Mar 2021 05:08 )  PTT:29.9 sec    TELEMETRY: sinus rhythm with intermittent v pacing.

## 2021-03-11 NOTE — CHART NOTE - NSCHARTNOTEFT_GEN_A_CORE
CCU Transfer Note    Transfer from: CCU  Transfer to:  (  ) Medicine    ( x ) Telemetry    (  ) RCU    (  ) Palliative    (  ) Stroke Unit    (  ) _______________  Accepting physician:    MEDICATIONS:  STANDING MEDICATIONS  acetaminophen   Tablet .. 650 milliGRAM(s) Oral every 6 hours  amLODIPine   Tablet 5 milliGRAM(s) Oral daily  aspirin enteric coated 81 milliGRAM(s) Oral daily  atorvastatin 10 milliGRAM(s) Oral at bedtime  cephalexin 500 milliGRAM(s) Oral every 12 hours  clopidogrel Tablet 75 milliGRAM(s) Oral daily  gabapentin 300 milliGRAM(s) Oral three times a day  lisinopril 20 milliGRAM(s) Oral daily  sodium chloride 0.9% lock flush 3 milliLiter(s) IV Push every 8 hours    PRN MEDICATIONS  benzocaine 15 mG/menthol 3.6 mG (Sugar-Free) Lozenge 1 Lozenge Oral every 2 hours PRN      VITAL SIGNS: Last 24 Hours  T(C): 36.9 (11 Mar 2021 15:00), Max: 37 (11 Mar 2021 07:00)  T(F): 98.4 (11 Mar 2021 15:00), Max: 98.6 (11 Mar 2021 07:00)  HR: 92 (11 Mar 2021 15:30) (48 - 92)  BP: 132/43 (11 Mar 2021 15:30) (75/35 - 160/68)  BP(mean): 65 (11 Mar 2021 15:30) (44 - 108)  RR: 15 (11 Mar 2021 15:30) (11 - 25)  SpO2: 99% (11 Mar 2021 15:30) (95% - 100%)    LABS:                        9.4    6.39  )-----------( 188      ( 11 Mar 2021 05:08 )             28.3     03-11    137  |  103  |  11  ----------------------------<  98  4.5   |  21<L>  |  0.45<L>    Ca    8.8      11 Mar 2021 05:08  Phos  2.9     03-11  Mg     2.1     03-11    TPro  5.6<L>  /  Alb  3.7  /  TBili  0.6  /  DBili  x   /  AST  19  /  ALT  6<L>  /  AlkPhos  49  03-11    PT/INR - ( 11 Mar 2021 05:08 )   PT: 11.8 sec;   INR: 0.98 ratio         PTT - ( 11 Mar 2021 05:08 )  PTT:29.9 sec    ABG - ( 11 Mar 2021 04:59 )  pH, Arterial: 7.48  pH, Blood: x     /  pCO2: 30    /  pO2: 126   / HCO3: 22    / Base Excess: -.7   /  SaO2: 99          CCU COURSE:  74 y/o with hx of HTN, HLD, CAD with KILEY mLAD 1/29/21, LS compression fracture, neuropathy, B/L lower extremity weakness with severe aortic stenosis, admitted to CICU s/p TAVR Core Valve c/b CHB with left femoral TVP in place and hypotension requiring levophed. Hypotension likely associated with AV mallory asynchrony and possible hypovolemia in s/o edwin op NPO. Pt was given fluid bolus and pacing rate adjusted with resolution of hypotension. Levophed was discontinued shortly after arrival to CICU. Pt underwent PPM placement 3/11 by EP and medicall stable to be transferred to the floors.       ASSESSMENT & PLAN:   74 y/o with hx of HTN, HLD, CAD with KILEY mLAD 1/29/21, LS compression fracture, neuropathy, B/L lower extremity weakness with severe aortic stenosis, admitted to CICU s/p TAVR Core Valve c/b CHB with left femoral TVP in place plan for PPM implantation.      Plan:  ====================== NEUROLOGY=====================  A&O x3, Nonfocal  - continue to monitor neuro status per ICU protocol.   - C/w Gabapentin 300mg TID for neuropathic pain    ==================== RESPIRATORY======================  Comfortable on room air, SpO2 100%  - Continue to monitor SpO2 via pulse oximetry  - Encourage bedside spirometry     ====================CARDIOVASCULAR==================  AS c/b CHB  - S/P TAVR   - Left femoral TVP in place. Sinus rhythm with intermittent pacing overnight on tele  - Scheduled for PPM 3/11 with Dr. Jolley.   - Advance diet as tolerated after PPM  - Monitor on telemetry   - repeat ECHO as per structural cards     Hx of CAD  - C/w DAPT: ASA and Plavix    Hypotension  - Weaned off Levophed gtt  - Resume home antihypertensive given -160  - Invasive hemodynamic monitoring, assess perfusion indices.     ===================HEMATOLOGIC/ONC ===================  Anemia, H/H 9.5/25.4.   - Continue to monitor hemoglobin and hematocrit levels.     ===================== RENAL =========================  No active issues   - Continue to monitor I/Os, BUN/Creatinine, and urine output.   - Goal net negative fluid balance. Replete lytes PRN. Keep K> 4 and Mg >2.     ==================== GASTROINTESTINAL===================  - Passed bedside S/S  - Monitor for flatulence   - Advance diet as tolerated     =======================    ENDOCRINE  =====================  Bgl controlled  - monitor glucose for need to initiate sliding scale.   ========================INFECTIOUS DISEASE================  Afebrile, WBC within normal limits.   - Monitor temperature and trend WBC.    For Follow-Up:  [ ] AC for TAVR   [ ] monitor on telemetry  [ ] EP final recs   [ ] TTE CCU Transfer Note    Transfer from: CCU  Transfer to:  (  ) Medicine    ( x ) Telemetry    (  ) RCU    (  ) Palliative    (  ) Stroke Unit    (  ) _______________  Accepting physician: Julius  Sign out given to Alan from thoracic sx     MEDICATIONS:  STANDING MEDICATIONS  acetaminophen   Tablet .. 650 milliGRAM(s) Oral every 6 hours  amLODIPine   Tablet 5 milliGRAM(s) Oral daily  aspirin enteric coated 81 milliGRAM(s) Oral daily  atorvastatin 10 milliGRAM(s) Oral at bedtime  cephalexin 500 milliGRAM(s) Oral every 12 hours  clopidogrel Tablet 75 milliGRAM(s) Oral daily  gabapentin 300 milliGRAM(s) Oral three times a day  lisinopril 20 milliGRAM(s) Oral daily  sodium chloride 0.9% lock flush 3 milliLiter(s) IV Push every 8 hours    PRN MEDICATIONS  benzocaine 15 mG/menthol 3.6 mG (Sugar-Free) Lozenge 1 Lozenge Oral every 2 hours PRN      VITAL SIGNS: Last 24 Hours  T(C): 36.9 (11 Mar 2021 15:00), Max: 37 (11 Mar 2021 07:00)  T(F): 98.4 (11 Mar 2021 15:00), Max: 98.6 (11 Mar 2021 07:00)  HR: 92 (11 Mar 2021 15:30) (48 - 92)  BP: 132/43 (11 Mar 2021 15:30) (75/35 - 160/68)  BP(mean): 65 (11 Mar 2021 15:30) (44 - 108)  RR: 15 (11 Mar 2021 15:30) (11 - 25)  SpO2: 99% (11 Mar 2021 15:30) (95% - 100%)    LABS:                        9.4    6.39  )-----------( 188      ( 11 Mar 2021 05:08 )             28.3     03-11    137  |  103  |  11  ----------------------------<  98  4.5   |  21<L>  |  0.45<L>    Ca    8.8      11 Mar 2021 05:08  Phos  2.9     03-11  Mg     2.1     03-11    TPro  5.6<L>  /  Alb  3.7  /  TBili  0.6  /  DBili  x   /  AST  19  /  ALT  6<L>  /  AlkPhos  49  03-11    PT/INR - ( 11 Mar 2021 05:08 )   PT: 11.8 sec;   INR: 0.98 ratio         PTT - ( 11 Mar 2021 05:08 )  PTT:29.9 sec    ABG - ( 11 Mar 2021 04:59 )  pH, Arterial: 7.48  pH, Blood: x     /  pCO2: 30    /  pO2: 126   / HCO3: 22    / Base Excess: -.7   /  SaO2: 99          CCU COURSE:  74 y/o with hx of HTN, HLD, CAD with KILEY mLAD 1/29/21, LS compression fracture, neuropathy, B/L lower extremity weakness with severe aortic stenosis, admitted to CICU s/p TAVR Core Valve c/b CHB with left femoral TVP in place and hypotension requiring levophed. Hypotension likely associated with AV mallory asynchrony and possible hypovolemia in s/o edwin op NPO. Pt was given fluid bolus and pacing rate adjusted with resolution of hypotension. Levophed was discontinued shortly after arrival to CICU. Pt underwent PPM placement 3/11 by EP and medicall stable to be transferred to the floors.       ASSESSMENT & PLAN:   74 y/o with hx of HTN, HLD, CAD with KILEY mLAD 1/29/21, LS compression fracture, neuropathy, B/L lower extremity weakness with severe aortic stenosis, admitted to CICU s/p TAVR Core Valve c/b CHB with left femoral TVP in place plan for PPM implantation.      Plan:  ====================== NEUROLOGY=====================  A&O x3, Nonfocal  - continue to monitor neuro status per ICU protocol.   - C/w Gabapentin 300mg TID for neuropathic pain    ==================== RESPIRATORY======================  Comfortable on room air, SpO2 100%  - Continue to monitor SpO2 via pulse oximetry  - Encourage bedside spirometry     ====================CARDIOVASCULAR==================  AS c/b CHB  - S/P TAVR   - Left femoral TVP in place. Sinus rhythm with intermittent pacing overnight on tele  - Scheduled for PPM 3/11 with Dr. Jolley.   - Advance diet as tolerated after PPM  - Monitor on telemetry   - repeat ECHO as per structural cards     Hx of CAD  - C/w DAPT: ASA and Plavix    Hypotension  - Weaned off Levophed gtt  - Resume home antihypertensive given -160  - Invasive hemodynamic monitoring, assess perfusion indices.     ===================HEMATOLOGIC/ONC ===================  Anemia, H/H 9.5/25.4.   - Continue to monitor hemoglobin and hematocrit levels.     ===================== RENAL =========================  No active issues   - Continue to monitor I/Os, BUN/Creatinine, and urine output.   - Goal net negative fluid balance. Replete lytes PRN. Keep K> 4 and Mg >2.     ==================== GASTROINTESTINAL===================  - Passed bedside S/S  - Monitor for flatulence   - Advance diet as tolerated     =======================    ENDOCRINE  =====================  Bgl controlled  - monitor glucose for need to initiate sliding scale.   ========================INFECTIOUS DISEASE================  Afebrile, WBC within normal limits.   - Monitor temperature and trend WBC.    For Follow-Up:  [ ] AC for TAVR   [ ] monitor on telemetry  [ ] EP final recs   [ ] TTE

## 2021-03-12 DIAGNOSIS — Z95.0 PRESENCE OF CARDIAC PACEMAKER: ICD-10-CM

## 2021-03-12 LAB
ALBUMIN SERPL ELPH-MCNC: 3.5 G/DL — SIGNIFICANT CHANGE UP (ref 3.3–5)
ALP SERPL-CCNC: 52 U/L — SIGNIFICANT CHANGE UP (ref 40–120)
ALT FLD-CCNC: 8 U/L — LOW (ref 10–45)
ANION GAP SERPL CALC-SCNC: 9 MMOL/L — SIGNIFICANT CHANGE UP (ref 5–17)
AST SERPL-CCNC: 26 U/L — SIGNIFICANT CHANGE UP (ref 10–40)
BILIRUB SERPL-MCNC: 0.6 MG/DL — SIGNIFICANT CHANGE UP (ref 0.2–1.2)
BUN SERPL-MCNC: 18 MG/DL — SIGNIFICANT CHANGE UP (ref 7–23)
CALCIUM SERPL-MCNC: 8.9 MG/DL — SIGNIFICANT CHANGE UP (ref 8.4–10.5)
CHLORIDE SERPL-SCNC: 104 MMOL/L — SIGNIFICANT CHANGE UP (ref 96–108)
CO2 SERPL-SCNC: 21 MMOL/L — LOW (ref 22–31)
CREAT SERPL-MCNC: 0.62 MG/DL — SIGNIFICANT CHANGE UP (ref 0.5–1.3)
GLUCOSE BLDC GLUCOMTR-MCNC: 149 MG/DL — HIGH (ref 70–99)
GLUCOSE SERPL-MCNC: 127 MG/DL — HIGH (ref 70–99)
HCT VFR BLD CALC: 28.8 % — LOW (ref 34.5–45)
HGB BLD-MCNC: 9.6 G/DL — LOW (ref 11.5–15.5)
MCHC RBC-ENTMCNC: 30.3 PG — SIGNIFICANT CHANGE UP (ref 27–34)
MCHC RBC-ENTMCNC: 33.3 GM/DL — SIGNIFICANT CHANGE UP (ref 32–36)
MCV RBC AUTO: 90.9 FL — SIGNIFICANT CHANGE UP (ref 80–100)
NRBC # BLD: 0 /100 WBCS — SIGNIFICANT CHANGE UP (ref 0–0)
PLATELET # BLD AUTO: 167 K/UL — SIGNIFICANT CHANGE UP (ref 150–400)
POTASSIUM SERPL-MCNC: 3.8 MMOL/L — SIGNIFICANT CHANGE UP (ref 3.5–5.3)
POTASSIUM SERPL-SCNC: 3.8 MMOL/L — SIGNIFICANT CHANGE UP (ref 3.5–5.3)
PROT SERPL-MCNC: 5.8 G/DL — LOW (ref 6–8.3)
RBC # BLD: 3.17 M/UL — LOW (ref 3.8–5.2)
RBC # FLD: 12.8 % — SIGNIFICANT CHANGE UP (ref 10.3–14.5)
SARS-COV-2 IGG SERPL QL IA: POSITIVE
SARS-COV-2 IGM SERPL IA-ACNC: 3.19 INDEX — HIGH
SODIUM SERPL-SCNC: 134 MMOL/L — LOW (ref 135–145)
WBC # BLD: 7.48 K/UL — SIGNIFICANT CHANGE UP (ref 3.8–10.5)
WBC # FLD AUTO: 7.48 K/UL — SIGNIFICANT CHANGE UP (ref 3.8–10.5)

## 2021-03-12 PROCEDURE — 93010 ELECTROCARDIOGRAM REPORT: CPT

## 2021-03-12 PROCEDURE — 99233 SBSQ HOSP IP/OBS HIGH 50: CPT

## 2021-03-12 PROCEDURE — 71046 X-RAY EXAM CHEST 2 VIEWS: CPT | Mod: 26

## 2021-03-12 PROCEDURE — 99232 SBSQ HOSP IP/OBS MODERATE 35: CPT

## 2021-03-12 RX ORDER — SODIUM CHLORIDE 9 MG/ML
250 INJECTION INTRAMUSCULAR; INTRAVENOUS; SUBCUTANEOUS ONCE
Refills: 0 | Status: COMPLETED | OUTPATIENT
Start: 2021-03-12 | End: 2021-03-12

## 2021-03-12 RX ORDER — POTASSIUM CHLORIDE 20 MEQ
20 PACKET (EA) ORAL ONCE
Refills: 0 | Status: COMPLETED | OUTPATIENT
Start: 2021-03-12 | End: 2021-03-12

## 2021-03-12 RX ORDER — LISINOPRIL 2.5 MG/1
5 TABLET ORAL DAILY
Refills: 0 | Status: DISCONTINUED | OUTPATIENT
Start: 2021-03-13 | End: 2021-03-15

## 2021-03-12 RX ORDER — FUROSEMIDE 40 MG
20 TABLET ORAL ONCE
Refills: 0 | Status: COMPLETED | OUTPATIENT
Start: 2021-03-12 | End: 2021-03-12

## 2021-03-12 RX ADMIN — AMLODIPINE BESYLATE 5 MILLIGRAM(S): 2.5 TABLET ORAL at 06:00

## 2021-03-12 RX ADMIN — CLOPIDOGREL BISULFATE 75 MILLIGRAM(S): 75 TABLET, FILM COATED ORAL at 12:57

## 2021-03-12 RX ADMIN — Medication 20 MILLIGRAM(S): at 08:33

## 2021-03-12 RX ADMIN — Medication 20 MILLIEQUIVALENT(S): at 06:52

## 2021-03-12 RX ADMIN — GABAPENTIN 300 MILLIGRAM(S): 400 CAPSULE ORAL at 06:00

## 2021-03-12 RX ADMIN — GABAPENTIN 300 MILLIGRAM(S): 400 CAPSULE ORAL at 20:40

## 2021-03-12 RX ADMIN — Medication 650 MILLIGRAM(S): at 23:50

## 2021-03-12 RX ADMIN — Medication 650 MILLIGRAM(S): at 08:34

## 2021-03-12 RX ADMIN — Medication 650 MILLIGRAM(S): at 16:34

## 2021-03-12 RX ADMIN — SODIUM CHLORIDE 500 MILLILITER(S): 9 INJECTION INTRAMUSCULAR; INTRAVENOUS; SUBCUTANEOUS at 13:13

## 2021-03-12 RX ADMIN — SODIUM CHLORIDE 3 MILLILITER(S): 9 INJECTION INTRAMUSCULAR; INTRAVENOUS; SUBCUTANEOUS at 21:25

## 2021-03-12 RX ADMIN — Medication 650 MILLIGRAM(S): at 16:30

## 2021-03-12 RX ADMIN — SODIUM CHLORIDE 3 MILLILITER(S): 9 INJECTION INTRAMUSCULAR; INTRAVENOUS; SUBCUTANEOUS at 14:11

## 2021-03-12 RX ADMIN — Medication 650 MILLIGRAM(S): at 04:39

## 2021-03-12 RX ADMIN — Medication 650 MILLIGRAM(S): at 00:35

## 2021-03-12 RX ADMIN — ATORVASTATIN CALCIUM 10 MILLIGRAM(S): 80 TABLET, FILM COATED ORAL at 21:26

## 2021-03-12 RX ADMIN — Medication 500 MILLIGRAM(S): at 12:56

## 2021-03-12 RX ADMIN — Medication 81 MILLIGRAM(S): at 12:56

## 2021-03-12 RX ADMIN — Medication 650 MILLIGRAM(S): at 08:33

## 2021-03-12 RX ADMIN — GABAPENTIN 300 MILLIGRAM(S): 400 CAPSULE ORAL at 13:42

## 2021-03-12 RX ADMIN — Medication 500 MILLIGRAM(S): at 21:26

## 2021-03-12 RX ADMIN — LISINOPRIL 20 MILLIGRAM(S): 2.5 TABLET ORAL at 06:00

## 2021-03-12 RX ADMIN — SODIUM CHLORIDE 3 MILLILITER(S): 9 INJECTION INTRAMUSCULAR; INTRAVENOUS; SUBCUTANEOUS at 05:15

## 2021-03-12 NOTE — PHYSICAL THERAPY INITIAL EVALUATION ADULT - PERTINENT HX OF CURRENT PROBLEM, REHAB EVAL
76 y/o with hx of HTN, HLD, CAD with KILEY mLAD 1/29/21, LS compression fracture, neuropathy, B/L lower extremity weakness with severe aortic stenosis, admitted to CICU s/p TAVR Core Valve c/b CHB with left femoral TVP in place and hypotension requiring levophed.. Pt underwent PPM placement 3/11

## 2021-03-12 NOTE — PROGRESS NOTE ADULT - ASSESSMENT
75 year old female PMHx HTN, HLD, CAD s/p cardiac cath with KILEY mLAD placement on 1/29/21, aortic stenosis s/p TAVR Core Valve 3/10, c/b CHB s/p  Left groin TVP, now s/p MDT dual chamber 3/11.    1. Aortic stenosis s/p TAVR 3/10  2. Post op SR, CHB, LBBB, junctional escape in the 50's with resolution and high risk for reoccurrence      - continue telemetry monitoring  - Keep K+> 4, MG++> 2  - post procedure teaching done with patient  - device paired and checked by representative  - ID card discharge instruction, and booklet given to patient    - chest xray PA lat to check lead tip placement  - follow in EP office on 3/19 at 10:40 am for wound and device check appointment. [phone]       75 year old female PMHx HTN, HLD, CAD s/p cardiac cath with KILEY mLAD placement on 1/29/21, aortic stenosis s/p TAVR Core Valve 3/10, c/b CHB s/p  Left groin TVP, now s/p MDT dual chamber 3/11.    1. Aortic stenosis s/p TAVR 3/10  2. Post op SR, CHB, LBBB, junctional escape in the 50's with resolution and high risk for reoccurrence      - continue telemetry monitoring  - Keep K+> 4, MG++> 2  - post procedure teaching done with patient  - device paired and checked by representative  - ID card discharge instruction, and booklet given to patient    - chest xray PA lat to check lead tip placement  - follow in EP office on 3/19 at 10:40 am for wound and device check appointment. [phone]  - once chest xray done, read, no pneumothorax patient will be cleared from EP perspective for discharge home   [phone]

## 2021-03-12 NOTE — PROGRESS NOTE ADULT - ASSESSMENT
74 y/o with hx of HTN, HLD, CAD with KILEY mLAD 1/29/21, LS compression fracture, neuropathy, B/L lower extremity weakness with severe aortic stenosis, admitted to CICU s/p TAVR Core Valve c/b CHB with left femoral TVP in place and hypotension requiring levophed. Hypotension likely associated with AV mallory asynchrony and possible hypovolemia in s/o edwin op NPO. Pt was given fluid bolus and pacing rate adjusted with resolution of hypotension. Levophed was discontinued shortly after arrival to CICU. Pt underwent PPM placement 3/11 by EP and medically stable to be transferred to the floors.     3/10 S/P TAVR, complete heart block , LBBB,  TVP in place, requiring vasopressors briefly.   3/11 s/p PPM , transferred to floor. TTE completed, pending read. Daily EKG.  74 y/o with hx of HTN, HLD, CAD with KILEY mLAD 1/29/21, LS compression fracture, neuropathy, B/L lower extremity weakness with severe aortic stenosis, admitted to CICU s/p TAVR Core Valve c/b CHB with left femoral TVP in place and hypotension requiring levophed. Hypotension likely associated with AV mallory asynchrony and possible hypovolemia in s/o edwin op NPO. Pt was given fluid bolus and pacing rate adjusted with resolution of hypotension. Levophed was discontinued shortly after arrival to CICU. Pt underwent PPM placement 3/11 by EP and medically stable to be transferred to the floors.   3/10 S/P TAVR, complete heart block , LBBB,  TVP in place, requiring vasopressors briefly.   3/11 s/p PPM , transferred to floor. TTE mild AR trace pericardial effusion,  Daily EKG.   3/12 VSS; lasix 20 mg po given for LE edema, pt- rehab recommended  Discharge plannig- rehab monday

## 2021-03-12 NOTE — PROGRESS NOTE ADULT - SUBJECTIVE AND OBJECTIVE BOX
*****Structural Heart Team*****    Subjective:    Patient resting in chair, c/o neuropathy pain to B/L Lower extremities.    PAST MEDICAL & SURGICAL HISTORY:  Neuropathy    Falls    Transient ischemic attack (TIA)  Jan 2014- with residual weakness on right leg    OAB (overactive bladder)    Type 2 diabetes mellitus  diet controlled    Compression fracture of spine    Aortic stenosis    Compression fracture of spine  2015 lumbar    OA (osteoarthritis)    CAD (coronary artery disease)    HLD (hyperlipidemia)    Hypertension    H/O oral surgery    History of cataract surgery    History of coronary artery stent placement  KILEY to mLAD (Jan 2021)          T(C): 37.7 (03-12-21 @ 12:31), Max: 37.7 (03-12-21 @ 12:31)  HR: 105 (03-12-21 @ 12:31) (90 - 105)  BP: 82/50 (03-12-21 @ 12:40) (82/48 - 139/58)  RR: 16 (03-12-21 @ 12:31) (15 - 25)  SpO2: 98% (03-12-21 @ 12:31) (98% - 100%)  Wt(kg): --  03-11 @ 07:01  -  03-12 @ 07:00  --------------------------------------------------------  IN: 710 mL / OUT: 680 mL / NET: 30 mL    03-12 @ 07:01  -  03-12 @ 14:33  --------------------------------------------------------  IN: 480 mL / OUT: 551 mL / NET: -71 mL      MEDICATIONS  (STANDING):  acetaminophen   Tablet .. 650 milliGRAM(s) Oral every 6 hours  aspirin enteric coated 81 milliGRAM(s) Oral daily  atorvastatin 10 milliGRAM(s) Oral at bedtime  cephalexin 500 milliGRAM(s) Oral every 12 hours  clopidogrel Tablet 75 milliGRAM(s) Oral daily  gabapentin 300 milliGRAM(s) Oral three times a day  sodium chloride 0.9% lock flush 3 milliLiter(s) IV Push every 8 hours    MEDICATIONS  (PRN):  benzocaine 15 mG/menthol 3.6 mG (Sugar-Free) Lozenge 1 Lozenge Oral every 2 hours PRN Sore Throat      Review of Symptoms:  Constitutional: Awake, Alert, Follows commands  Respiratory: Denies  Cardiac: Denies CP, Denies Palpitations  Gastrointestinal: Denies Pain, Denies N/V, tolerating po intake  Vascular: Negative  Extremities: + Edema, No joint pain or swelling  Neurological: Negative  Endocrine: No heat or cold intolerance, No excessive thirst  Heme/Onc: Negative    Exam:  General: A/Ox3, COLE, NAD  HEENT: Supple, No JVD, Trachea midline, no masses  Pulmonary: CTAB, = Chest Excursion, no accessory muscle use  Cor: S1S2, RRR, No murmur or rub  ECG: SR  Groin/Wound: Soft, No hematoma, Mild ecchymosis  Gastrointestinal: Soft, NT/ND, + Bowel Sounds  Neuro: = motor and sensory B/L, No focal deficits  Vascular: 1+ pulses B/L, Trace edema  Extremities: Trace edema, + Pain on palpation to lower extremities  Skin: Warm/Dry/Normal color, Normal turgor, no rashes                          9.6    7.48  )-----------( 167      ( 12 Mar 2021 06:10 )             28.8   03-12    134<L>  |  104  |  18  ----------------------------<  127<H>  3.8   |  21<L>  |  0.62    Ca    8.9      12 Mar 2021 06:10  Phos  2.9     03-11  Mg     2.1     03-11    TPro  5.8<L>  /  Alb  3.5  /  TBili  0.6  /  DBili  x   /  AST  26  /  ALT  8<L>  /  AlkPhos  52  03-12  PT/INR - ( 11 Mar 2021 05:08 )   PT: 11.8 sec;   INR: 0.98 ratio         PTT - ( 11 Mar 2021 05:08 )  PTT:29.9 sec    Imaging Reviewed:    Post Op TTE:  < from: Transthoracic Echocardiogram (03.11.21 @ 19:11) >  EF (Visual Estimate): >70 %  Doppler Peak Velocity (m/sec): MV=1.9 AoV=2.4  ------------------------------------------------------------------------  Observations:  Mitral Valve: Mitral annular calcification and calcified  mitral leaflets with decreased diastolic opening. Mild  mitral regurgitation.  Peak mitral valve gradient equals 16  mm Hg, mean transmitral valve gradient equals 5 mm Hg,  consistent with moderate mitral stenosis. ()  Aortic Valve/Aorta: Transcatheter aortic valve replacement.  Peak transaortic valve gradient equals 23 mm Hg, mean  transaortic valve gradient equals 12 mm Hg, which is  probably normal in the presence of a transcatheter aortic  valve replacement. Minimal paravalvular aortic  regurgitation.  Peak left ventricular outflow tract  gradient equals 100mm Hg, consistent with severe LVOT  obstruction.  Aortic Root: 2.5 cm.  LVOT diameter: 1.6 cm.  Left Atrium: Normal left atrium.  LA volume index = 19  cc/m2.  Left Ventricle: Hyperdynamic left ventricular systolic  function. Moderate concentric leftventricular hypertrophy.  Reversal of the E-A waves of the mitral inflow pattern  consistent with reduced compliance of the left ventricle.  Right Heart: Normal right atrium. Normal right ventricular  size and function. Tricuspid valve not well visualized.  Mild tricuspid regurgitation. Pulmonic valve not well  visualized. Minimal pulmonic regurgitation.  Pericardium/Pleura: Normal pericardium with trace  pericardial effusion.  Hemodynamic: Estimated right atrial pressure is 8 mm Hg.  Estimated right ventricular systolic pressure equals 42 mm  Hg, assuming right atrial pressure equals 8 mm Hg,  consistent with mild pulmonary hypertension.  ------------------------------------------------------------------------  Conclusions:  1. Mitral annular calcification and calcified mitral  leaflets with decreased diastolic opening. Mild mitral  regurgitation.  Peak mitral valve gradient equals 16 mm Hg,  mean transmitral valve gradient equals 5 mm Hg, consistent  with moderate mitral stenosis. ()  2. Transcatheter aortic valve replacement. Peak transaortic  valve gradient equals 23 mm Hg, mean transaortic valve  gradient equals 12 mm Hg, which is probably normal in the  presence of a transcatheter aortic valve replacement.  Minimal paravalvular aortic regurgitation.  3. Moderate concentric left ventricular hypertrophy.  4. Hyperdynamic left ventricular systolic function. Peak  left ventricular outflow tract gradient equals 100 mm Hg,  consistent with severe LVOT obstruction.  5. Reversal of the E-A waves of the mitral inflow pattern  consistent with reduced compliance of the left ventricle.  6. Normal right ventricular size and function.  7. Estimated pulmonary artery systolic pressure equals 42  mm Hg, assuming right atrial pressure equals 8 mm Hg,  consistent with mild pulmonary pressures.  8. Normal pericardium with trace pericardial effusion.  *** Compared with echocardiogram of 3/10/2021, s/p TAVR  with improved gradients across the AV but has LVOT  obstruction in the setting of concentric left ventricular  hypertrophy.    < end of copied text >        Assesment/Plan:    75Female, S/P TAVR for Severe Aortic Stenosis,Peripheral Neuropathy    1.) S/P TAVR: ASA 81 mg po daily, Plavix 75 mg po daily, Continue to Monitor Groins. Ambulate as tolerated. Post op TTE looks good  2.) Patient will be discharged on an MCOT for a period of 30days to monitor for rhythm changes post TAVR.  3.) Peripheral Neuropathy: Patient c/o pain in Lower extremities, she states the timing of her Neurontin and tylenol are off from her home doses. Would recommend readjust timing of medication to coincide with her usual dosing.  4.) PT evaluated, recommended for rehab post discharge  5.) Discharge Plan: Patient is to follow up with Dr. Madrid in 1 week post discharge. She should then follow up with [facility]  in 30 days, with a repeat Transthoracic Echo to be done at that visit.    JASPER Samuel  [phone]

## 2021-03-12 NOTE — PROGRESS NOTE ADULT - PROBLEM SELECTOR PLAN 1
asa , plavix  POD #1 TTE completed, pending read  s/p PPM , check PA/Lat CXR in AM  daily EKG continue postop  care  continue asa and plavix for anticoagulation   lasix 20 mg po - 1 dose this am given   postop echo completed  daily EKG  pain management  pulm toilet  Discharge planning- rehab monday

## 2021-03-12 NOTE — PROGRESS NOTE ADULT - SUBJECTIVE AND OBJECTIVE BOX
24H hour events: states feels weak has leg swelling     MEDICATIONS:  amLODIPine   Tablet 5 milliGRAM(s) Oral daily  aspirin enteric coated 81 milliGRAM(s) Oral daily  clopidogrel Tablet 75 milliGRAM(s) Oral daily  cephalexin 500 milliGRAM(s) Oral every 12 hours  acetaminophen   Tablet .. 650 milliGRAM(s) Oral every 6 hours  gabapentin 300 milliGRAM(s) Oral three times a day  atorvastatin 10 milliGRAM(s) Oral at bedtime  benzocaine 15 mG/menthol 3.6 mG (Sugar-Free) Lozenge 1 Lozenge Oral every 2 hours PRN  sodium chloride 0.9% lock flush 3 milliLiter(s) IV Push every 8 hours    REVIEW OF SYSTEMS:  See HPI, otherwise ROS negative.    PHYSICAL EXAM:  T(C): 36.4 (03-12-21 @ 04:52), Max: 36.9 (03-11-21 @ 15:00)  HR: 98 (03-12-21 @ 10:55) (80 - 101)  BP: 112/58 (03-12-21 @ 10:55) (93/58 - 160/68)  RR: 18 (03-12-21 @ 04:52) (15 - 25)  SpO2: 98% (03-12-21 @ 04:52) (98% - 100%)    I&O's Summary    11 Mar 2021 07:01  -  12 Mar 2021 07:00  --------------------------------------------------------  IN: 710 mL / OUT: 680 mL / NET: 30 mL    12 Mar 2021 07:01  -  12 Mar 2021 12:19  --------------------------------------------------------  IN: 240 mL / OUT: 550 mL / NET: -310 mL    Appearance: Alert. NAD	sitting up in bed   Cardiovascular: +S1S2 RRR no m/g/r  Respiratory: CTA B/L	  Psychiatry: A & O x 3, Mood & affect appropriate  Gastrointestinal:  Soft, NT. ND. +BS	  Skin: left infraclavicular incision implant site flat , no hematoma, no bleeding no ecchymosis 	  Neurologic: Non-focal  Extremities: pitting edema BLE   Vascular: Peripheral pulses palpable 2+ bilaterally  LABS:	 	    CBC Full  -  ( 12 Mar 2021 06:10 )  WBC Count : 7.48 K/uL  Hemoglobin : 9.6 g/dL  Hematocrit : 28.8 %  Platelet Count - Automated : 167 K/uL  Mean Cell Volume : 90.9 fl  Mean Cell Hemoglobin : 30.3 pg  Mean Cell Hemoglobin Concentration : 33.3 gm/dL  Auto Neutrophil # : x  Auto Lymphocyte # : x  Auto Monocyte # : x  Auto Eosinophil # : x  Auto Basophil # : x  Auto Neutrophil % : x  Auto Lymphocyte % : x  Auto Monocyte % : x  Auto Eosinophil % : x  Auto Basophil % : x    03-12    134<L>  |  104  |  18  ----------------------------<  127<H>  3.8   |  21<L>  |  0.62  03-11    137  |  103  |  11  ----------------------------<  98  4.5   |  21<L>  |  0.45<L>    Ca    8.9      12 Mar 2021 06:10  Ca    8.8      11 Mar 2021 05:08  Phos  2.9     03-11  Phos  3.0     03-10  Mg     2.1     03-11  Mg     1.9     03-10    TPro  5.8<L>  /  Alb  3.5  /  TBili  0.6  /  DBili  x   /  AST  26  /  ALT  8<L>  /  AlkPhos  52  03-12  TPro  5.6<L>  /  Alb  3.7  /  TBili  0.6  /  DBili  x   /  AST  19  /  ALT  6<L>  /  AlkPhos  49  03-11    TELEMETRY: SR 90 -100  	    ECG:  SR 88 bpm, 1st degree AV delay  TX  210ms, RBBB 140 ms     RADIOLOGY:

## 2021-03-12 NOTE — PROGRESS NOTE ADULT - SUBJECTIVE AND OBJECTIVE BOX
VITAL SIGNS    Telemetry:    Vital Signs Last 24 Hrs  T(C): 36.4 (03-12-21 @ 04:52), Max: 36.9 (03-11-21 @ 15:00)  T(F): 97.6 (03-12-21 @ 04:52), Max: 98.4 (03-11-21 @ 15:00)  HR: 98 (03-12-21 @ 10:55) (80 - 101)  BP: 112/58 (03-12-21 @ 10:55) (93/58 - 160/68)  RR: 18 (03-12-21 @ 04:52) (15 - 25)  SpO2: 98% (03-12-21 @ 04:52) (98% - 100%)            03-11 @ 07:01  -  03-12 @ 07:00  --------------------------------------------------------  IN: 710 mL / OUT: 680 mL / NET: 30 mL    03-12 @ 07:01  -  03-12 @ 11:57  --------------------------------------------------------  IN: 240 mL / OUT: 550 mL / NET: -310 mL       Daily     Daily   Admit Wt: Drug Dosing Weight  Height (cm): 165.1 (11 Mar 2021 09:00)  Weight (kg): 65.6 (11 Mar 2021 09:00)  BMI (kg/m2): 24.1 (11 Mar 2021 09:00)  BSA (m2): 1.72 (11 Mar 2021 09:00)    Bilirubin Total, Serum: 0.6 mg/dL (03-12 @ 06:10)    CAPILLARY BLOOD GLUCOSE      POCT Blood Glucose.: 149 mg/dL (12 Mar 2021 00:52)  POCT Blood Glucose.: 214 mg/dL (11 Mar 2021 21:41)          acetaminophen   Tablet .. 650 milliGRAM(s) Oral every 6 hours  amLODIPine   Tablet 5 milliGRAM(s) Oral daily  aspirin enteric coated 81 milliGRAM(s) Oral daily  atorvastatin 10 milliGRAM(s) Oral at bedtime  benzocaine 15 mG/menthol 3.6 mG (Sugar-Free) Lozenge 1 Lozenge Oral every 2 hours PRN  cephalexin 500 milliGRAM(s) Oral every 12 hours  clopidogrel Tablet 75 milliGRAM(s) Oral daily  gabapentin 300 milliGRAM(s) Oral three times a day  sodium chloride 0.9% lock flush 3 milliLiter(s) IV Push every 8 hours      PHYSICAL EXAM    Subjective: "Hi.   Neurology: alert and oriented x 3, nonfocal, no gross deficits  CV : tele:  RSR  Sternal Wound :  CDI with dressing , Stable  Lungs: clear. RR easy, unlabored   Abdomen: soft, nontender, nondistended, positive bowel sounds, bowel movement   Neg N/V/D   :  pt voiding without difficulty   Extremities:   GREENE; edema, neg calf tenderness.   PPP bilaterally      PW:  Chest tubes:                 VITAL SIGNS    Telemetry:  rsr 1st 80-90   Vital Signs Last 24 Hrs  T(C): 36.4 (03-12-21 @ 04:52), Max: 36.9 (03-11-21 @ 15:00)  T(F): 97.6 (03-12-21 @ 04:52), Max: 98.4 (03-11-21 @ 15:00)  HR: 98 (03-12-21 @ 10:55) (80 - 101)  BP: 112/58 (03-12-21 @ 10:55) (93/58 - 160/68)  RR: 18 (03-12-21 @ 04:52) (15 - 25)  SpO2: 98% (03-12-21 @ 04:52) (98% - 100%)            03-11 @ 07:01  -  03-12 @ 07:00  --------------------------------------------------------  IN: 710 mL / OUT: 680 mL / NET: 30 mL    03-12 @ 07:01  -  03-12 @ 11:57  --------------------------------------------------------  IN: 240 mL / OUT: 550 mL / NET: -310 mL       Daily     Daily   Admit Wt: Drug Dosing Weight  Height (cm): 165.1 (11 Mar 2021 09:00)  Weight (kg): 65.6 (11 Mar 2021 09:00)  BMI (kg/m2): 24.1 (11 Mar 2021 09:00)  BSA (m2): 1.72 (11 Mar 2021 09:00)    Bilirubin Total, Serum: 0.6 mg/dL (03-12 @ 06:10)    CAPILLARY BLOOD GLUCOSE      POCT Blood Glucose.: 149 mg/dL (12 Mar 2021 00:52)  POCT Blood Glucose.: 214 mg/dL (11 Mar 2021 21:41)          acetaminophen   Tablet .. 650 milliGRAM(s) Oral every 6 hours  amLODIPine   Tablet 5 milliGRAM(s) Oral daily  aspirin enteric coated 81 milliGRAM(s) Oral daily  atorvastatin 10 milliGRAM(s) Oral at bedtime  benzocaine 15 mG/menthol 3.6 mG (Sugar-Free) Lozenge 1 Lozenge Oral every 2 hours PRN  cephalexin 500 milliGRAM(s) Oral every 12 hours  clopidogrel Tablet 75 milliGRAM(s) Oral daily  gabapentin 300 milliGRAM(s) Oral three times a day  sodium chloride 0.9% lock flush 3 milliLiter(s) IV Push every 8 hours      PHYSICAL EXAM    Subjective: "I need rehab."   Neurology: alert and oriented x 3, nonfocal, no gross deficits  CV : tele:  RSR 1st 80-90   Lungs: clear. RR easy, unlabored   Abdomen: soft, nontender, nondistended, positive bowel sounds, + bowel movement   Neg N/V/D; obese abdomen    :  pt voiding without difficulty - primafit in place   Extremities:   GREENE; trace LE edema, neg calf tenderness.   PPP bilaterally

## 2021-03-13 LAB
ALBUMIN SERPL ELPH-MCNC: 3.4 G/DL — SIGNIFICANT CHANGE UP (ref 3.3–5)
ALP SERPL-CCNC: 49 U/L — SIGNIFICANT CHANGE UP (ref 40–120)
ALT FLD-CCNC: 8 U/L — LOW (ref 10–45)
ANION GAP SERPL CALC-SCNC: 9 MMOL/L — SIGNIFICANT CHANGE UP (ref 5–17)
AST SERPL-CCNC: 23 U/L — SIGNIFICANT CHANGE UP (ref 10–40)
BILIRUB SERPL-MCNC: 0.4 MG/DL — SIGNIFICANT CHANGE UP (ref 0.2–1.2)
BUN SERPL-MCNC: 20 MG/DL — SIGNIFICANT CHANGE UP (ref 7–23)
CALCIUM SERPL-MCNC: 8.8 MG/DL — SIGNIFICANT CHANGE UP (ref 8.4–10.5)
CHLORIDE SERPL-SCNC: 107 MMOL/L — SIGNIFICANT CHANGE UP (ref 96–108)
CO2 SERPL-SCNC: 20 MMOL/L — LOW (ref 22–31)
CREAT SERPL-MCNC: 0.59 MG/DL — SIGNIFICANT CHANGE UP (ref 0.5–1.3)
GLUCOSE SERPL-MCNC: 138 MG/DL — HIGH (ref 70–99)
HCT VFR BLD CALC: 26.5 % — LOW (ref 34.5–45)
HGB BLD-MCNC: 8.5 G/DL — LOW (ref 11.5–15.5)
MCHC RBC-ENTMCNC: 29.3 PG — SIGNIFICANT CHANGE UP (ref 27–34)
MCHC RBC-ENTMCNC: 32.1 GM/DL — SIGNIFICANT CHANGE UP (ref 32–36)
MCV RBC AUTO: 91.4 FL — SIGNIFICANT CHANGE UP (ref 80–100)
NRBC # BLD: 0 /100 WBCS — SIGNIFICANT CHANGE UP (ref 0–0)
PLATELET # BLD AUTO: 152 K/UL — SIGNIFICANT CHANGE UP (ref 150–400)
POTASSIUM SERPL-MCNC: 4 MMOL/L — SIGNIFICANT CHANGE UP (ref 3.5–5.3)
POTASSIUM SERPL-SCNC: 4 MMOL/L — SIGNIFICANT CHANGE UP (ref 3.5–5.3)
PROT SERPL-MCNC: 5.7 G/DL — LOW (ref 6–8.3)
RBC # BLD: 2.9 M/UL — LOW (ref 3.8–5.2)
RBC # FLD: 13.1 % — SIGNIFICANT CHANGE UP (ref 10.3–14.5)
SODIUM SERPL-SCNC: 136 MMOL/L — SIGNIFICANT CHANGE UP (ref 135–145)
WBC # BLD: 5.59 K/UL — SIGNIFICANT CHANGE UP (ref 3.8–10.5)
WBC # FLD AUTO: 5.59 K/UL — SIGNIFICANT CHANGE UP (ref 3.8–10.5)

## 2021-03-13 PROCEDURE — 99232 SBSQ HOSP IP/OBS MODERATE 35: CPT

## 2021-03-13 RX ORDER — METOPROLOL TARTRATE 50 MG
12.5 TABLET ORAL
Refills: 0 | Status: DISCONTINUED | OUTPATIENT
Start: 2021-03-13 | End: 2021-03-15

## 2021-03-13 RX ORDER — GABAPENTIN 400 MG/1
600 CAPSULE ORAL AT BEDTIME
Refills: 0 | Status: DISCONTINUED | OUTPATIENT
Start: 2021-03-13 | End: 2021-03-15

## 2021-03-13 RX ORDER — GABAPENTIN 400 MG/1
300 CAPSULE ORAL
Refills: 0 | Status: DISCONTINUED | OUTPATIENT
Start: 2021-03-13 | End: 2021-03-15

## 2021-03-13 RX ORDER — GABAPENTIN 400 MG/1
300 CAPSULE ORAL ONCE
Refills: 0 | Status: COMPLETED | OUTPATIENT
Start: 2021-03-13 | End: 2021-03-13

## 2021-03-13 RX ADMIN — CLOPIDOGREL BISULFATE 75 MILLIGRAM(S): 75 TABLET, FILM COATED ORAL at 11:34

## 2021-03-13 RX ADMIN — Medication 650 MILLIGRAM(S): at 03:40

## 2021-03-13 RX ADMIN — SODIUM CHLORIDE 3 MILLILITER(S): 9 INJECTION INTRAMUSCULAR; INTRAVENOUS; SUBCUTANEOUS at 14:19

## 2021-03-13 RX ADMIN — Medication 12.5 MILLIGRAM(S): at 21:49

## 2021-03-13 RX ADMIN — Medication 12.5 MILLIGRAM(S): at 11:32

## 2021-03-13 RX ADMIN — Medication 81 MILLIGRAM(S): at 11:33

## 2021-03-13 RX ADMIN — ATORVASTATIN CALCIUM 10 MILLIGRAM(S): 80 TABLET, FILM COATED ORAL at 21:49

## 2021-03-13 RX ADMIN — GABAPENTIN 300 MILLIGRAM(S): 400 CAPSULE ORAL at 05:44

## 2021-03-13 RX ADMIN — GABAPENTIN 300 MILLIGRAM(S): 400 CAPSULE ORAL at 20:52

## 2021-03-13 RX ADMIN — LISINOPRIL 5 MILLIGRAM(S): 2.5 TABLET ORAL at 05:45

## 2021-03-13 RX ADMIN — Medication 500 MILLIGRAM(S): at 21:49

## 2021-03-13 RX ADMIN — Medication 650 MILLIGRAM(S): at 23:59

## 2021-03-13 RX ADMIN — Medication 650 MILLIGRAM(S): at 11:33

## 2021-03-13 RX ADMIN — Medication 500 MILLIGRAM(S): at 10:57

## 2021-03-13 RX ADMIN — SODIUM CHLORIDE 3 MILLILITER(S): 9 INJECTION INTRAMUSCULAR; INTRAVENOUS; SUBCUTANEOUS at 21:49

## 2021-03-13 RX ADMIN — GABAPENTIN 300 MILLIGRAM(S): 400 CAPSULE ORAL at 13:16

## 2021-03-13 RX ADMIN — Medication 650 MILLIGRAM(S): at 17:24

## 2021-03-13 RX ADMIN — SODIUM CHLORIDE 3 MILLILITER(S): 9 INJECTION INTRAMUSCULAR; INTRAVENOUS; SUBCUTANEOUS at 05:43

## 2021-03-13 NOTE — PROVIDER CONTACT NOTE (OTHER) - ASSESSMENT
Asymptomatic  Patient sitting at bedside on the phone  Denies cp, sob or dizziness  BP=95/61  RR=988  R= 18

## 2021-03-13 NOTE — PROGRESS NOTE ADULT - SUBJECTIVE AND OBJECTIVE BOX
VITAL SIGNS    Telemetry:    Vital Signs Last 24 Hrs  T(C): 36.9 (03-13-21 @ 04:05), Max: 37.7 (03-12-21 @ 12:31)  T(F): 98.5 (03-13-21 @ 04:05), Max: 99.8 (03-12-21 @ 12:31)  HR: 87 (03-13-21 @ 04:05) (87 - 105)  BP: 110/64 (03-13-21 @ 04:05) (82/48 - 117/69)  RR: 18 (03-13-21 @ 04:05) (16 - 18)  SpO2: 99% (03-13-21 @ 04:05) (98% - 100%)            03-12 @ 07:01  -  03-13 @ 07:00  --------------------------------------------------------  IN: 720 mL / OUT: 551 mL / NET: 169 mL       Daily     Daily   Admit Wt: Drug Dosing Weight  Height (cm): 165.1 (12 Mar 2021 08:30)  Weight (kg): 65.6 (12 Mar 2021 08:30)  BMI (kg/m2): 24.1 (12 Mar 2021 08:30)  BSA (m2): 1.72 (12 Mar 2021 08:30)    Bilirubin Total, Serum: 0.4 mg/dL (03-13 @ 05:24)    CAPILLARY BLOOD GLUCOSE              acetaminophen   Tablet .. 650 milliGRAM(s) Oral every 6 hours  aspirin enteric coated 81 milliGRAM(s) Oral daily  atorvastatin 10 milliGRAM(s) Oral at bedtime  benzocaine 15 mG/menthol 3.6 mG (Sugar-Free) Lozenge 1 Lozenge Oral every 2 hours PRN  cephalexin 500 milliGRAM(s) Oral every 12 hours  clopidogrel Tablet 75 milliGRAM(s) Oral daily  gabapentin 300 milliGRAM(s) Oral three times a day  lisinopril 5 milliGRAM(s) Oral daily  sodium chloride 0.9% lock flush 3 milliLiter(s) IV Push every 8 hours      PHYSICAL EXAM    Subjective: "Hi.   Neurology: alert and oriented x 3, nonfocal, no gross deficits  CV : tele:  RSR  Sternal Wound :  CDI with dressing , Stable  Lungs: clear. RR easy, unlabored   Abdomen: soft, nontender, nondistended, positive bowel sounds, bowel movement   Neg N/V/D   :  pt voiding without difficulty   Extremities:   GREENE; edema, neg calf tenderness.   PPP bilaterally      PW:  Chest tubes:                 VITAL SIGNS    Telemetry:  rsr  ; brief pat 160  Vital Signs Last 24 Hrs  T(C): 36.9 (03-13-21 @ 04:05), Max: 37.7 (03-12-21 @ 12:31)  T(F): 98.5 (03-13-21 @ 04:05), Max: 99.8 (03-12-21 @ 12:31)  HR: 87 (03-13-21 @ 04:05) (87 - 105)  BP: 110/64 (03-13-21 @ 04:05) (82/48 - 117/69)  RR: 18 (03-13-21 @ 04:05) (16 - 18)  SpO2: 99% (03-13-21 @ 04:05) (98% - 100%)            03-12 @ 07:01  -  03-13 @ 07:00  --------------------------------------------------------  IN: 720 mL / OUT: 551 mL / NET: 169 mL       Daily     Daily   Admit Wt: Drug Dosing Weight  Height (cm): 165.1 (12 Mar 2021 08:30)  Weight (kg): 65.6 (12 Mar 2021 08:30)  BMI (kg/m2): 24.1 (12 Mar 2021 08:30)  BSA (m2): 1.72 (12 Mar 2021 08:30)    Bilirubin Total, Serum: 0.4 mg/dL (03-13 @ 05:24)    CAPILLARY BLOOD GLUCOSE              acetaminophen   Tablet .. 650 milliGRAM(s) Oral every 6 hours  aspirin enteric coated 81 milliGRAM(s) Oral daily  atorvastatin 10 milliGRAM(s) Oral at bedtime  benzocaine 15 mG/menthol 3.6 mG (Sugar-Free) Lozenge 1 Lozenge Oral every 2 hours PRN  cephalexin 500 milliGRAM(s) Oral every 12 hours  clopidogrel Tablet 75 milliGRAM(s) Oral daily  gabapentin 300 milliGRAM(s) Oral three times a day  lisinopril 5 milliGRAM(s) Oral daily  sodium chloride 0.9% lock flush 3 milliLiter(s) IV Push every 8 hours      PHYSICAL EXAM    Subjective: "I'm better today."   Neurology: alert and oriented x 3, nonfocal, no gross deficits  CV : tele:   rsr  ; brief pat 160; PPM site cdi with SS- HIWOT- neg bleeding/hematoma   Lungs: clear. RR easy, unlabored   Abdomen: soft, nontender, nondistended, positive bowel sounds,+ bowel movement;  Neg N/V/D   :  pt voiding without difficulty   Extremities:   GREENE; trace LE edema, neg calf tenderness.   PPP bilaterally; bilateral groin sites cdi hiwot- neg bleeding/hematoma

## 2021-03-13 NOTE — PROGRESS NOTE ADULT - NSHPATTENDINGPLANDISCUSS_GEN_ALL_CORE
DR. Madrid
cardiac surgery and structural heart team.
CICU team/Dr. Hebert, cardiac surgery and structural heart team.
CTS
DR. Madrid

## 2021-03-13 NOTE — PROGRESS NOTE ADULT - ASSESSMENT
74 y/o with hx of HTN, HLD, CAD with KILEY mLAD 1/29/21, LS compression fracture, neuropathy, B/L lower extremity weakness with severe aortic stenosis, admitted to CICU s/p TAVR Core Valve c/b CHB with left femoral TVP in place and hypotension requiring levophed. Hypotension likely associated with AV mallory asynchrony and possible hypovolemia in s/o edwin op NPO. Pt was given fluid bolus and pacing rate adjusted with resolution of hypotension. Levophed was discontinued shortly after arrival to CICU. Pt underwent PPM placement 3/11 by EP and medically stable to be transferred to the floors.   3/10 S/P TAVR, complete heart block , LBBB,  TVP in place, requiring vasopressors briefly.   3/11 s/p PPM , transferred to floor. TTE mild AR trace pericardial effusion,  Daily EKG.   3/12 VSS; lasix 20 mg po given for LE edema, pt- rehab recommended  Discharge plannig- rehab monday    76 y/o with hx of HTN, HLD, CAD with KILEY mLAD 1/29/21, LS compression fracture, neuropathy, B/L lower extremity weakness with severe aortic stenosis, admitted to CICU s/p TAVR Core Valve c/b CHB with left femoral TVP in place and hypotension requiring levophed. Hypotension likely associated with AV mallory asynchrony and possible hypovolemia in s/o edwin op NPO. Pt was given fluid bolus and pacing rate adjusted with resolution of hypotension. Levophed was discontinued shortly after arrival to CICU. Pt underwent PPM placement 3/11 by EP and medically stable to be transferred to the floors.   3/10 S/P TAVR, complete heart block , LBBB,  TVP in place, requiring vasopressors briefly.   3/11 s/p PPM , transferred to floor. TTE mild AR trace pericardial effusion,  Daily EKG.   3/12 VSS; lasix 20 mg po given for LE edema, pt- rehab recommended  3/13 chest xray- pa/lateral neg ptx; brief PAT- lopressor 12.5 bid initiated   Discharge planning- rehab monday - ck covid sun

## 2021-03-13 NOTE — PROGRESS NOTE ADULT - PROBLEM SELECTOR PLAN 2
s/p PPM 3/11 with EP  ck pa/lateral chest xray today  pain management  continue sling s/p PPM 3/11 with EP  ck pa/lateral chest xray neg ptx   pain management  lopressor 12.5 mg po bid initiated

## 2021-03-13 NOTE — PROGRESS NOTE ADULT - PROBLEM SELECTOR PLAN 1
continue postop  care  continue asa and plavix for anticoagulation   lasix 20 mg po - 1 dose this am given   postop echo completed  daily EKG  pain management  pulm toilet  Discharge planning- rehab monday continue postop  care  continue asa and plavix for anticoagulation   initiate lopressor 12.5 mg po bid for HR control  postop echo completed  daily EKG  pain management  pulm toilet  Discharge planning- rehab monday

## 2021-03-14 LAB
ALBUMIN SERPL ELPH-MCNC: 3.2 G/DL — LOW (ref 3.3–5)
ALP SERPL-CCNC: 47 U/L — SIGNIFICANT CHANGE UP (ref 40–120)
ALT FLD-CCNC: 7 U/L — LOW (ref 10–45)
ANION GAP SERPL CALC-SCNC: 9 MMOL/L — SIGNIFICANT CHANGE UP (ref 5–17)
AST SERPL-CCNC: 18 U/L — SIGNIFICANT CHANGE UP (ref 10–40)
BILIRUB SERPL-MCNC: 0.3 MG/DL — SIGNIFICANT CHANGE UP (ref 0.2–1.2)
BUN SERPL-MCNC: 21 MG/DL — SIGNIFICANT CHANGE UP (ref 7–23)
CALCIUM SERPL-MCNC: 9 MG/DL — SIGNIFICANT CHANGE UP (ref 8.4–10.5)
CHLORIDE SERPL-SCNC: 104 MMOL/L — SIGNIFICANT CHANGE UP (ref 96–108)
CO2 SERPL-SCNC: 21 MMOL/L — LOW (ref 22–31)
CREAT SERPL-MCNC: 0.58 MG/DL — SIGNIFICANT CHANGE UP (ref 0.5–1.3)
GLUCOSE SERPL-MCNC: 126 MG/DL — HIGH (ref 70–99)
HCT VFR BLD CALC: 26.6 % — LOW (ref 34.5–45)
HGB BLD-MCNC: 8.6 G/DL — LOW (ref 11.5–15.5)
MCHC RBC-ENTMCNC: 29.9 PG — SIGNIFICANT CHANGE UP (ref 27–34)
MCHC RBC-ENTMCNC: 32.3 GM/DL — SIGNIFICANT CHANGE UP (ref 32–36)
MCV RBC AUTO: 92.4 FL — SIGNIFICANT CHANGE UP (ref 80–100)
NRBC # BLD: 0 /100 WBCS — SIGNIFICANT CHANGE UP (ref 0–0)
PLATELET # BLD AUTO: 157 K/UL — SIGNIFICANT CHANGE UP (ref 150–400)
POTASSIUM SERPL-MCNC: 4.2 MMOL/L — SIGNIFICANT CHANGE UP (ref 3.5–5.3)
POTASSIUM SERPL-SCNC: 4.2 MMOL/L — SIGNIFICANT CHANGE UP (ref 3.5–5.3)
PROT SERPL-MCNC: 5.6 G/DL — LOW (ref 6–8.3)
RBC # BLD: 2.88 M/UL — LOW (ref 3.8–5.2)
RBC # FLD: 13.1 % — SIGNIFICANT CHANGE UP (ref 10.3–14.5)
SARS-COV-2 RNA SPEC QL NAA+PROBE: SIGNIFICANT CHANGE UP
SODIUM SERPL-SCNC: 134 MMOL/L — LOW (ref 135–145)
WBC # BLD: 5.66 K/UL — SIGNIFICANT CHANGE UP (ref 3.8–10.5)
WBC # FLD AUTO: 5.66 K/UL — SIGNIFICANT CHANGE UP (ref 3.8–10.5)

## 2021-03-14 PROCEDURE — 99238 HOSP IP/OBS DSCHRG MGMT 30/<: CPT

## 2021-03-14 RX ORDER — MUPIROCIN 20 MG/G
1 OINTMENT TOPICAL EVERY 12 HOURS
Refills: 0 | Status: COMPLETED | OUTPATIENT
Start: 2021-03-14 | End: 2021-03-15

## 2021-03-14 RX ADMIN — Medication 650 MILLIGRAM(S): at 21:41

## 2021-03-14 RX ADMIN — BENZOCAINE AND MENTHOL 1 LOZENGE: 5; 1 LIQUID ORAL at 11:50

## 2021-03-14 RX ADMIN — Medication 12.5 MILLIGRAM(S): at 05:24

## 2021-03-14 RX ADMIN — ATORVASTATIN CALCIUM 10 MILLIGRAM(S): 80 TABLET, FILM COATED ORAL at 21:40

## 2021-03-14 RX ADMIN — Medication 650 MILLIGRAM(S): at 10:12

## 2021-03-14 RX ADMIN — Medication 650 MILLIGRAM(S): at 15:56

## 2021-03-14 RX ADMIN — SODIUM CHLORIDE 3 MILLILITER(S): 9 INJECTION INTRAMUSCULAR; INTRAVENOUS; SUBCUTANEOUS at 21:39

## 2021-03-14 RX ADMIN — Medication 650 MILLIGRAM(S): at 05:26

## 2021-03-14 RX ADMIN — GABAPENTIN 300 MILLIGRAM(S): 400 CAPSULE ORAL at 06:18

## 2021-03-14 RX ADMIN — CLOPIDOGREL BISULFATE 75 MILLIGRAM(S): 75 TABLET, FILM COATED ORAL at 10:12

## 2021-03-14 RX ADMIN — Medication 81 MILLIGRAM(S): at 10:12

## 2021-03-14 RX ADMIN — Medication 650 MILLIGRAM(S): at 06:19

## 2021-03-14 RX ADMIN — Medication 650 MILLIGRAM(S): at 01:01

## 2021-03-14 RX ADMIN — SODIUM CHLORIDE 3 MILLILITER(S): 9 INJECTION INTRAMUSCULAR; INTRAVENOUS; SUBCUTANEOUS at 12:19

## 2021-03-14 RX ADMIN — Medication 650 MILLIGRAM(S): at 22:48

## 2021-03-14 RX ADMIN — Medication 12.5 MILLIGRAM(S): at 18:22

## 2021-03-14 RX ADMIN — Medication 500 MILLIGRAM(S): at 09:09

## 2021-03-14 RX ADMIN — GABAPENTIN 300 MILLIGRAM(S): 400 CAPSULE ORAL at 18:22

## 2021-03-14 RX ADMIN — Medication 650 MILLIGRAM(S): at 11:00

## 2021-03-14 RX ADMIN — GABAPENTIN 300 MILLIGRAM(S): 400 CAPSULE ORAL at 11:50

## 2021-03-14 RX ADMIN — LISINOPRIL 5 MILLIGRAM(S): 2.5 TABLET ORAL at 05:24

## 2021-03-14 RX ADMIN — MUPIROCIN 1 APPLICATION(S): 20 OINTMENT TOPICAL at 23:44

## 2021-03-14 RX ADMIN — GABAPENTIN 600 MILLIGRAM(S): 400 CAPSULE ORAL at 23:43

## 2021-03-14 RX ADMIN — SODIUM CHLORIDE 3 MILLILITER(S): 9 INJECTION INTRAMUSCULAR; INTRAVENOUS; SUBCUTANEOUS at 05:24

## 2021-03-14 RX ADMIN — Medication 650 MILLIGRAM(S): at 16:50

## 2021-03-14 RX ADMIN — GABAPENTIN 600 MILLIGRAM(S): 400 CAPSULE ORAL at 00:00

## 2021-03-14 NOTE — PROGRESS NOTE ADULT - PROVIDER SPECIALTY LIST ADULT
[facility]
Structural Heart
Structural Heart
CCU
Structural Heart
CCU
CT Surgery
Electrophysiology
CT Surgery
Electrophysiology

## 2021-03-14 NOTE — PROGRESS NOTE ADULT - ASSESSMENT
76 y/o with hx of HTN, HLD, CAD with KILEY mLAD 1/29/21, LS compression fracture, neuropathy, B/L lower extremity weakness with severe aortic stenosis, admitted to CICU s/p TAVR Core Valve c/b CHB with left femoral TVP in place and hypotension requiring levophed. Hypotension likely associated with AV mallory asynchrony and possible hypovolemia in s/o edwin op NPO. Pt was given fluid bolus and pacing rate adjusted with resolution of hypotension. Levophed was discontinued shortly after arrival to CICU. Pt underwent PPM placement 3/11 by EP and medically stable to be transferred to the floors.   3/10 S/P TAVR, complete heart block , LBBB,  TVP in place, requiring vasopressors briefly.   3/11 s/p PPM , transferred to floor. TTE mild AR trace pericardial effusion,  Daily EKG.   3/12 VSS; lasix 20 mg po given for LE edema, pt- rehab recommended  3/13 chest xray- pa/lateral neg ptx; brief PAT- lopressor 12.5 bid initiated   3/14 VSS - covid ordered -   Discharge planning- rehab monday

## 2021-03-14 NOTE — PROGRESS NOTE ADULT - PROBLEM SELECTOR PLAN 1
continue postop  care  continue asa and plavix for anticoagulation   initiate lopressor 12.5 mg po bid for HR control  postop echo completed  daily EKG  pain management  pulm toilet  Discharge planning- rehab monday

## 2021-03-14 NOTE — PROGRESS NOTE ADULT - PROBLEM SELECTOR PLAN 2
s/p PPM 3/11 with EP  ck pa/lateral chest xray neg ptx   pain management  lopressor 12.5 mg po bid initiated

## 2021-03-14 NOTE — PROGRESS NOTE ADULT - SUBJECTIVE AND OBJECTIVE BOX
VITAL SIGNS-Telemetry:  SR 1st deg 80-90  Vital Signs Last 24 Hrs  T(C): 36.8 (21 @ 04:25), Max: 37.1 (21 @ 13:27)  T(F): 98.3 (21 @ 04:25), Max: 98.8 (21 @ 13:27)  HR: 82 (21 @ 04:25) (82 - 88)  BP: 122/73 (21 @ 04:25) (122/73 - 132/60)  RR: 16 (21 @ 04:25) (16 - 18)  SpO2: 99% (21 @ 04:25) (98% - 99%)          @ 07:01  -   @ 07:00  --------------------------------------------------------  IN: 720 mL / OUT: 551 mL / NET: 169 mL     @ 06:01  -   @ 06:58  --------------------------------------------------------  IN: 840 mL / OUT: 3 mL / NET: 837 mL    Daily     Daily Weight in k.5 (13 Mar 2021 08:00)         PHYSICAL EXAM:  Neurology: alert and oriented x 3, nonfocal, no gross deficits  CV : S1S2  Lungs: CTA  Abdomen: soft, nontender, nondistended, positive bowel sounds, last bowel movement         Extremities:    no edema , no calf tenderness     acetaminophen   Tablet .. 650 milliGRAM(s) Oral every 6 hours  aspirin enteric coated 81 milliGRAM(s) Oral daily  atorvastatin 10 milliGRAM(s) Oral at bedtime  benzocaine 15 mG/menthol 3.6 mG (Sugar-Free) Lozenge 1 Lozenge Oral every 2 hours PRN  cephalexin 500 milliGRAM(s) Oral every 12 hours  clopidogrel Tablet 75 milliGRAM(s) Oral daily  gabapentin 600 milliGRAM(s) Oral at bedtime  gabapentin 300 milliGRAM(s) Oral <User Schedule>  lisinopril 5 milliGRAM(s) Oral daily  metoprolol tartrate 12.5 milliGRAM(s) Oral two times a day  sodium chloride 0.9% lock flush 3 milliLiter(s) IV Push every 8 hours    Physical Therapy Rec:   Home  [  ]   Home w/ PT  [  ]  Rehab  [x  ]  Discussed with Cardiothoracic Team at AM rounds.

## 2021-03-15 ENCOUNTER — TRANSCRIPTION ENCOUNTER (OUTPATIENT)
Age: 76
End: 2021-03-15

## 2021-03-15 VITALS
DIASTOLIC BLOOD PRESSURE: 66 MMHG | HEART RATE: 83 BPM | TEMPERATURE: 98 F | RESPIRATION RATE: 16 BRPM | SYSTOLIC BLOOD PRESSURE: 120 MMHG | OXYGEN SATURATION: 98 %

## 2021-03-15 LAB
ALBUMIN SERPL ELPH-MCNC: 3.7 G/DL — SIGNIFICANT CHANGE UP (ref 3.3–5)
ALP SERPL-CCNC: 54 U/L — SIGNIFICANT CHANGE UP (ref 40–120)
ALT FLD-CCNC: 9 U/L — LOW (ref 10–45)
ANION GAP SERPL CALC-SCNC: 8 MMOL/L — SIGNIFICANT CHANGE UP (ref 5–17)
AST SERPL-CCNC: 15 U/L — SIGNIFICANT CHANGE UP (ref 10–40)
BILIRUB SERPL-MCNC: 0.5 MG/DL — SIGNIFICANT CHANGE UP (ref 0.2–1.2)
BUN SERPL-MCNC: 23 MG/DL — SIGNIFICANT CHANGE UP (ref 7–23)
CALCIUM SERPL-MCNC: 9.4 MG/DL — SIGNIFICANT CHANGE UP (ref 8.4–10.5)
CHLORIDE SERPL-SCNC: 106 MMOL/L — SIGNIFICANT CHANGE UP (ref 96–108)
CO2 SERPL-SCNC: 23 MMOL/L — SIGNIFICANT CHANGE UP (ref 22–31)
CREAT SERPL-MCNC: 0.56 MG/DL — SIGNIFICANT CHANGE UP (ref 0.5–1.3)
GLUCOSE SERPL-MCNC: 116 MG/DL — HIGH (ref 70–99)
HCT VFR BLD CALC: 27.3 % — LOW (ref 34.5–45)
HGB BLD-MCNC: 8.8 G/DL — LOW (ref 11.5–15.5)
MCHC RBC-ENTMCNC: 30 PG — SIGNIFICANT CHANGE UP (ref 27–34)
MCHC RBC-ENTMCNC: 32.2 GM/DL — SIGNIFICANT CHANGE UP (ref 32–36)
MCV RBC AUTO: 93.2 FL — SIGNIFICANT CHANGE UP (ref 80–100)
NRBC # BLD: 0 /100 WBCS — SIGNIFICANT CHANGE UP (ref 0–0)
PLATELET # BLD AUTO: 192 K/UL — SIGNIFICANT CHANGE UP (ref 150–400)
POTASSIUM SERPL-MCNC: 4.5 MMOL/L — SIGNIFICANT CHANGE UP (ref 3.5–5.3)
POTASSIUM SERPL-SCNC: 4.5 MMOL/L — SIGNIFICANT CHANGE UP (ref 3.5–5.3)
PROT SERPL-MCNC: 6.2 G/DL — SIGNIFICANT CHANGE UP (ref 6–8.3)
RBC # BLD: 2.93 M/UL — LOW (ref 3.8–5.2)
RBC # FLD: 13 % — SIGNIFICANT CHANGE UP (ref 10.3–14.5)
SODIUM SERPL-SCNC: 137 MMOL/L — SIGNIFICANT CHANGE UP (ref 135–145)
WBC # BLD: 5.08 K/UL — SIGNIFICANT CHANGE UP (ref 3.8–10.5)
WBC # FLD AUTO: 5.08 K/UL — SIGNIFICANT CHANGE UP (ref 3.8–10.5)

## 2021-03-15 PROCEDURE — 85610 PROTHROMBIN TIME: CPT

## 2021-03-15 PROCEDURE — 83036 HEMOGLOBIN GLYCOSYLATED A1C: CPT

## 2021-03-15 PROCEDURE — 80061 LIPID PANEL: CPT

## 2021-03-15 PROCEDURE — 82962 GLUCOSE BLOOD TEST: CPT

## 2021-03-15 PROCEDURE — 97161 PT EVAL LOW COMPLEX 20 MIN: CPT

## 2021-03-15 PROCEDURE — C1889: CPT

## 2021-03-15 PROCEDURE — 84100 ASSAY OF PHOSPHORUS: CPT

## 2021-03-15 PROCEDURE — 82330 ASSAY OF CALCIUM: CPT

## 2021-03-15 PROCEDURE — 82803 BLOOD GASES ANY COMBINATION: CPT

## 2021-03-15 PROCEDURE — 85027 COMPLETE CBC AUTOMATED: CPT

## 2021-03-15 PROCEDURE — U0005: CPT

## 2021-03-15 PROCEDURE — 85014 HEMATOCRIT: CPT

## 2021-03-15 PROCEDURE — 83735 ASSAY OF MAGNESIUM: CPT

## 2021-03-15 PROCEDURE — 71045 X-RAY EXAM CHEST 1 VIEW: CPT

## 2021-03-15 PROCEDURE — C1785: CPT

## 2021-03-15 PROCEDURE — 84443 ASSAY THYROID STIM HORMONE: CPT

## 2021-03-15 PROCEDURE — 86769 SARS-COV-2 COVID-19 ANTIBODY: CPT

## 2021-03-15 PROCEDURE — 82435 ASSAY OF BLOOD CHLORIDE: CPT

## 2021-03-15 PROCEDURE — C1898: CPT

## 2021-03-15 PROCEDURE — 76000 FLUOROSCOPY <1 HR PHYS/QHP: CPT

## 2021-03-15 PROCEDURE — 86900 BLOOD TYPING SEROLOGIC ABO: CPT

## 2021-03-15 PROCEDURE — C1894: CPT

## 2021-03-15 PROCEDURE — 33208 INSRT HEART PM ATRIAL & VENT: CPT

## 2021-03-15 PROCEDURE — C1887: CPT

## 2021-03-15 PROCEDURE — 80053 COMPREHEN METABOLIC PANEL: CPT

## 2021-03-15 PROCEDURE — 0031A: CPT

## 2021-03-15 PROCEDURE — 93005 ELECTROCARDIOGRAM TRACING: CPT

## 2021-03-15 PROCEDURE — C1892: CPT

## 2021-03-15 PROCEDURE — U0003: CPT

## 2021-03-15 PROCEDURE — 86901 BLOOD TYPING SEROLOGIC RH(D): CPT

## 2021-03-15 PROCEDURE — 85025 COMPLETE CBC W/AUTO DIFF WBC: CPT

## 2021-03-15 PROCEDURE — C1769: CPT

## 2021-03-15 PROCEDURE — 82947 ASSAY GLUCOSE BLOOD QUANT: CPT

## 2021-03-15 PROCEDURE — 99239 HOSP IP/OBS DSCHRG MGMT >30: CPT

## 2021-03-15 PROCEDURE — 85018 HEMOGLOBIN: CPT

## 2021-03-15 PROCEDURE — 86850 RBC ANTIBODY SCREEN: CPT

## 2021-03-15 PROCEDURE — 84295 ASSAY OF SERUM SODIUM: CPT

## 2021-03-15 PROCEDURE — 85730 THROMBOPLASTIN TIME PARTIAL: CPT

## 2021-03-15 PROCEDURE — 93306 TTE W/DOPPLER COMPLETE: CPT

## 2021-03-15 PROCEDURE — 71046 X-RAY EXAM CHEST 2 VIEWS: CPT

## 2021-03-15 PROCEDURE — P9045: CPT

## 2021-03-15 PROCEDURE — C1725: CPT

## 2021-03-15 PROCEDURE — 83605 ASSAY OF LACTIC ACID: CPT

## 2021-03-15 PROCEDURE — 84132 ASSAY OF SERUM POTASSIUM: CPT

## 2021-03-15 PROCEDURE — C1760: CPT

## 2021-03-15 RX ORDER — METOPROLOL TARTRATE 50 MG
0.5 TABLET ORAL
Qty: 0 | Refills: 0 | DISCHARGE
Start: 2021-03-15

## 2021-03-15 RX ORDER — JNJ-78436735 50000000000 [PFU]/.5ML
0.5 SUSPENSION INTRAMUSCULAR ONCE
Refills: 0 | Status: COMPLETED | OUTPATIENT
Start: 2021-03-15 | End: 2021-03-15

## 2021-03-15 RX ORDER — OXYBUTYNIN CHLORIDE 5 MG
5 TABLET ORAL THREE TIMES A DAY
Refills: 0 | Status: DISCONTINUED | OUTPATIENT
Start: 2021-03-15 | End: 2021-03-15

## 2021-03-15 RX ADMIN — SODIUM CHLORIDE 3 MILLILITER(S): 9 INJECTION INTRAMUSCULAR; INTRAVENOUS; SUBCUTANEOUS at 05:31

## 2021-03-15 RX ADMIN — Medication 650 MILLIGRAM(S): at 06:25

## 2021-03-15 RX ADMIN — LISINOPRIL 5 MILLIGRAM(S): 2.5 TABLET ORAL at 05:33

## 2021-03-15 RX ADMIN — JNJ-78436735 0.5 MILLILITER(S): 50000000000 SUSPENSION INTRAMUSCULAR at 11:23

## 2021-03-15 RX ADMIN — MUPIROCIN 1 APPLICATION(S): 20 OINTMENT TOPICAL at 05:34

## 2021-03-15 RX ADMIN — GABAPENTIN 300 MILLIGRAM(S): 400 CAPSULE ORAL at 11:27

## 2021-03-15 RX ADMIN — GABAPENTIN 300 MILLIGRAM(S): 400 CAPSULE ORAL at 05:33

## 2021-03-15 RX ADMIN — Medication 81 MILLIGRAM(S): at 11:27

## 2021-03-15 RX ADMIN — Medication 12.5 MILLIGRAM(S): at 05:33

## 2021-03-15 RX ADMIN — Medication 5 MILLIGRAM(S): at 11:22

## 2021-03-15 NOTE — DISCHARGE NOTE NURSING/CASE MANAGEMENT/SOCIAL WORK - PATIENT PORTAL LINK FT
You can access the FollowMyHealth Patient Portal offered by [facility] by registering at the following website: [email or web address]. By joining [facility]’s FollowMyHealth portal, you will also be able to view your health information using other applications (apps) compatible with our system.

## 2021-03-15 NOTE — DISCHARGE NOTE PROVIDER - NSDCFUADDAPPT_GEN_ALL_CORE_FT
see   see Dr Madrid   after rehab  follow up with Dr Paula       in one month  se graham own cardiologist and PCP after rehab

## 2021-03-15 NOTE — DISCHARGE NOTE PROVIDER - NSDCMRMEDTOKEN_GEN_ALL_CORE_FT
acetaminophen 325 mg oral capsule: 1 cap(s) orally every 8 hours, As Needed  amLODIPine 5 mg oral tablet: 1 tab(s) orally once a day  aspirin 81 mg oral delayed release tablet: 1 tab(s) orally once a day  Caltrate 600 + D oral tablet: 1 tab(s) orally once a day  clopidogrel 75 mg oral tablet: 1 tab(s) orally once a day (at bedtime)  Ditropan 5 mg oral tablet: 1 tab(s) orally 3 times a day  Fish Oil 1000 mg oral capsule: 1 cap(s) orally once a day. Instructed to stop on 3/8/21.  gabapentin 300 mg oral capsule: 1 cap(s) orally 3 times a day  lovastatin 40 mg oral tablet: 1 tab(s) orally once a day (at bedtime)  metoprolol: 0.5 tab(s) orally 2 times a day  Multiple Vitamins oral tablet: 1 tab(s) orally once a day. Instructed to stop on 3/8/21.  Osteo Bi-Flex 250 mg-200 mg oral tablet: orally once a day. Instructed to stop on 3/8/21.   ramipril 1.25 mg oral tablet: 1 tab(s) orally once a day (at bedtime)   acetaminophen 325 mg oral capsule: 1 cap(s) orally every 8 hours, As Needed  amLODIPine 5 mg oral tablet: 1 tab(s) orally once a day  aspirin 81 mg oral delayed release tablet: 1 tab(s) orally once a day  Caltrate 600 + D oral tablet: 1 tab(s) orally once a day  clopidogrel 75 mg oral tablet: 1 tab(s) orally once a day (at bedtime)  Ditropan 5 mg oral tablet: 1 tab(s) orally 3 times a day  Fish Oil 1000 mg oral capsule: 1 cap(s) orally once a day. Instructed to stop on 3/8/21.  gabapentin 300 mg oral capsule: 1 cap(s) orally 3 times a day  gabapentin 600 mg oral tablet: 1 cap(s) orally once a day (at bedtime)  lovastatin 40 mg oral tablet: 1 tab(s) orally once a day (at bedtime)  metoprolol: 0.5 tab(s) orally 2 times a day  Multiple Vitamins oral tablet: 1 tab(s) orally once a day. Instructed to stop on 3/8/21.  Osteo Bi-Flex 250 mg-200 mg oral tablet: orally once a day. Instructed to stop on 3/8/21.   ramipril 1.25 mg oral tablet: 1 tab(s) orally once a day (at bedtime)

## 2021-03-15 NOTE — DISCHARGE NOTE NURSING/CASE MANAGEMENT/SOCIAL WORK - NSDCFUADDAPPT_GEN_ALL_CORE_FT
see Dr Madrid   after rehab  follow up with Dr Paula       in one month  se graham own cardiologist and PCP after rehab

## 2021-03-15 NOTE — DISCHARGE NOTE PROVIDER - CARE PROVIDER_API CALL
Donta Madrid)  Thoracic and Cardiac Surgery  [address] [place] [address]  Phone: [phone]  Fax: [phone]  Follow Up Time:

## 2021-03-15 NOTE — DISCHARGE NOTE PROVIDER - HOSPITAL COURSE
76 y/o with hx of HTN, HLD, CAD with KILEY mLAD 1/29/21, LS compression fracture, neuropathy, B/L lower extremity weakness with severe aortic stenosis, admitted to CICU s/p TAVR Core Valve c/b CHB with left femoral TVP in place and hypotension requiring levophed. Hypotension likely associated with AV mallory asynchrony and possible hypovolemia in s/o edwin op NPO. Pt was given fluid bolus and pacing rate adjusted with resolution of hypotension. Levophed was discontinued shortly after arrival to CICU. Pt underwent PPM placement 3/11 by EP and medically stable to be transferred to the floors.   3/10 S/P TAVR, complete heart block , LBBB,  TVP in place, requiring vasopressors briefly.   3/11 s/p PPM , transferred to floor. TTE mild AR trace pericardial effusion,  Daily EKG.   3/12 VSS; lasix 20 mg po given for LE edema, pt- rehab recommended  3/13 chest xray- pa/lateral neg ptx; brief PAT- lopressor 12.5 bid initiated   Discharge planning- rehab monday - ck covid sun   3/15   ordered covid chauncey  dc to rehab

## 2021-03-16 ENCOUNTER — TRANSCRIPTION ENCOUNTER (OUTPATIENT)
Age: 76
End: 2021-03-16

## 2021-03-17 ENCOUNTER — TRANSCRIPTION ENCOUNTER (OUTPATIENT)
Age: 76
End: 2021-03-17

## 2021-03-18 ENCOUNTER — TRANSCRIPTION ENCOUNTER (OUTPATIENT)
Age: 76
End: 2021-03-18

## 2021-03-19 ENCOUNTER — TRANSCRIPTION ENCOUNTER (OUTPATIENT)
Age: 76
End: 2021-03-19

## 2021-03-19 ENCOUNTER — APPOINTMENT (OUTPATIENT)
Dept: ELECTROPHYSIOLOGY | Facility: CLINIC | Age: 76
End: 2021-03-19

## 2021-03-24 ENCOUNTER — TRANSCRIPTION ENCOUNTER (OUTPATIENT)
Age: 76
End: 2021-03-24

## 2021-03-27 ENCOUNTER — TRANSCRIPTION ENCOUNTER (OUTPATIENT)
Age: 76
End: 2021-03-27

## 2021-03-29 ENCOUNTER — NON-APPOINTMENT (OUTPATIENT)
Age: 76
End: 2021-03-29

## 2021-03-29 ENCOUNTER — TRANSCRIPTION ENCOUNTER (OUTPATIENT)
Age: 76
End: 2021-03-29

## 2021-04-02 ENCOUNTER — TRANSCRIPTION ENCOUNTER (OUTPATIENT)
Age: 76
End: 2021-04-02

## 2021-04-05 ENCOUNTER — APPOINTMENT (OUTPATIENT)
Dept: CARE COORDINATION | Facility: HOME HEALTH | Age: 76
End: 2021-04-05

## 2021-04-05 DIAGNOSIS — Z95.2 PRESENCE OF PROSTHETIC HEART VALVE: ICD-10-CM

## 2021-04-06 VITALS
RESPIRATION RATE: 14 BRPM | HEART RATE: 73 BPM | SYSTOLIC BLOOD PRESSURE: 128 MMHG | DIASTOLIC BLOOD PRESSURE: 60 MMHG | OXYGEN SATURATION: 98 %

## 2021-04-06 PROBLEM — Z95.2 S/P TAVR (TRANSCATHETER AORTIC VALVE REPLACEMENT): Status: ACTIVE | Noted: 2021-04-06

## 2021-04-06 RX ORDER — LOVASTATIN 40 MG/1
40 TABLET ORAL
Refills: 0 | Status: ACTIVE | COMMUNITY

## 2021-04-06 RX ORDER — PNV NO.95/FERROUS FUM/FOLIC AC 28MG-0.8MG
TABLET ORAL
Refills: 0 | Status: ACTIVE | COMMUNITY

## 2021-04-06 RX ORDER — RAMIPRIL 1.25 MG/1
1.25 CAPSULE ORAL
Refills: 0 | Status: ACTIVE | COMMUNITY

## 2021-04-06 RX ORDER — METRONIDAZOLE 500 MG/1
5 TABLET ORAL
Refills: 0 | Status: ACTIVE | COMMUNITY

## 2021-04-06 RX ORDER — GABAPENTIN 300 MG/1
300 CAPSULE ORAL
Refills: 0 | Status: ACTIVE | COMMUNITY

## 2021-04-06 RX ORDER — GABAPENTIN 300 MG/1
300 CAPSULE ORAL
Refills: 0 | Status: DISCONTINUED | COMMUNITY
End: 2021-04-06

## 2021-04-06 RX ORDER — AMLODIPINE BESYLATE 5 MG/1
5 TABLET ORAL DAILY
Qty: 30 | Refills: 0 | Status: ACTIVE | COMMUNITY

## 2021-04-06 NOTE — HISTORY OF PRESENT ILLNESS
[FreeTextEntry1] : FOLLOW YOUR HEART HOME VISIT-[employer]\par Home Visit made Silvia NO ] .   patient of Dr Tapia   S/P TAVR   on 3/10. Discharged  from [facility] to [facility] [facility]\par \par \par Visiting patient for post discharge transitional care management and post surgical follow up. \par Arrived to  apartment.  she is accompanied by her .\par she appears well. in no distress on my arrival.\par states she was awaiting HHA arrival but times were confused as per , HHA will arrive on Friday.\par \par no acute concerns regarding safety at this time.  She ambulates with a walker around the house without trouble. \par raised toilet seat and walk in shower.\par \par she expressed concern about making follow up visit with CT surgery... uses a car service to visits because she does not have access a ride.  Her and her  try to coordinate visits on the same day due to mobility for each and transportation constraints.\par She prefers to follow up with her cardiologist at this time\par

## 2021-04-06 NOTE — ASSESSMENT
[FreeTextEntry1] : 76 y/o with hx of HTN, HLD, CAD with KILEY mLAD 1/29/21, LS compression fracture, neuropathy, B/L lower extremity weakness, sever aortic stenosis-\par 3/10 S/P TAVR with Dr Madrid- post op complete heart block \par 3/11 s/p PPM \par Discharged to sub acute

## 2021-04-06 NOTE — PHYSICAL EXAM
[Sclera] : the sclera and conjunctiva were normal [Neck Appearance] : the appearance of the neck was normal [Auscultation Breath Sounds / Voice Sounds] : lungs were clear to auscultation bilaterally [Heart Rate And Rhythm] : heart rate was normal and rhythm regular [Heart Sounds] : normal S1 and S2 [1+] : left 1+ [Bowel Sounds] : normal bowel sounds [Abdomen Soft] : soft [Skin Color & Pigmentation] : normal skin color and pigmentation [FreeTextEntry1] : B/L groins healed  [No Focal Deficits] : no focal deficits [Oriented To Time, Place, And Person] : oriented to person, place, and time

## 2021-04-14 ENCOUNTER — TRANSCRIPTION ENCOUNTER (OUTPATIENT)
Age: 76
End: 2021-04-14

## 2021-04-23 ENCOUNTER — NON-APPOINTMENT (OUTPATIENT)
Age: 76
End: 2021-04-23

## 2021-04-23 RX ORDER — METOPROLOL TARTRATE 25 MG/1
25 TABLET, FILM COATED ORAL
Qty: 60 | Refills: 0 | Status: ACTIVE | COMMUNITY

## 2022-08-25 NOTE — PHYSICAL THERAPY INITIAL EVALUATION ADULT - ORIENTATION, REHAB EVAL
Refill request has been to sent to pcp, awaiting signature.    oriented to person, place, time and situation

## 2022-11-24 NOTE — PATIENT PROFILE ADULT - FALL HARM RISK CONCLUSION
Fall with Harm Risk
Pt arrives to ED s/p two bowel movements with blood as per daughter.  Pt daughter states pt is baseline mental status A&Ox2 knows herself and where she is but does not know the year.  Pt denies pain.  Past history of colon cancer that was removed.  MD at bedside assessing pt.  Pt is on Xarelto for prior heart rhythm issue which daughter cannot name.

## 2023-01-13 NOTE — ASU PATIENT PROFILE, ADULT - CENTRAL VENOUS CATHETER
no PAST SURGICAL HISTORY:  H/O abdominal hysterectomy     History of cholecystectomy     Status post bariatric surgery Gastric bypass status for obesity

## 2023-06-03 ENCOUNTER — INPATIENT (INPATIENT)
Facility: HOSPITAL | Age: 78
LOS: 10 days | Discharge: NURSING, REHAB OR LONG-TERM CARE | DRG: 480 | End: 2023-06-14
Attending: SURGERY | Admitting: SURGERY
Payer: MEDICARE

## 2023-06-03 VITALS
OXYGEN SATURATION: 98 % | RESPIRATION RATE: 18 BRPM | HEART RATE: 69 BPM | HEIGHT: 68 IN | SYSTOLIC BLOOD PRESSURE: 180 MMHG | TEMPERATURE: 98 F | WEIGHT: 160.06 LBS | DIASTOLIC BLOOD PRESSURE: 78 MMHG

## 2023-06-03 DIAGNOSIS — Z95.5 PRESENCE OF CORONARY ANGIOPLASTY IMPLANT AND GRAFT: Chronic | ICD-10-CM
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
## 2023-06-03 DIAGNOSIS — Z98.890 OTHER SPECIFIED POSTPROCEDURAL STATES: Chronic | ICD-10-CM

## 2023-06-03 DIAGNOSIS — Z98.49 CATARACT EXTRACTION STATUS, UNSPECIFIED EYE: Chronic | ICD-10-CM

## 2023-06-03 LAB
ALBUMIN SERPL ELPH-MCNC: 4 G/DL — SIGNIFICANT CHANGE UP (ref 3.5–5.2)
ALP SERPL-CCNC: 66 U/L — SIGNIFICANT CHANGE UP (ref 30–115)
ALT FLD-CCNC: 12 U/L — SIGNIFICANT CHANGE UP (ref 0–41)
ANION GAP SERPL CALC-SCNC: 12 MMOL/L — SIGNIFICANT CHANGE UP (ref 7–14)
ANION GAP SERPL CALC-SCNC: 12 MMOL/L — SIGNIFICANT CHANGE UP (ref 7–14)
APTT BLD: 30.8 SEC — SIGNIFICANT CHANGE UP (ref 27–39.2)
AST SERPL-CCNC: 30 U/L — SIGNIFICANT CHANGE UP (ref 0–41)
BASOPHILS # BLD AUTO: 0.02 K/UL — SIGNIFICANT CHANGE UP (ref 0–0.2)
BASOPHILS # BLD AUTO: 0.03 K/UL — SIGNIFICANT CHANGE UP (ref 0–0.2)
BASOPHILS NFR BLD AUTO: 0.3 % — SIGNIFICANT CHANGE UP (ref 0–1)
BASOPHILS NFR BLD AUTO: 0.3 % — SIGNIFICANT CHANGE UP (ref 0–1)
BILIRUB SERPL-MCNC: 0.4 MG/DL — SIGNIFICANT CHANGE UP (ref 0.2–1.2)
BLD GP AB SCN SERPL QL: SIGNIFICANT CHANGE UP
BUN SERPL-MCNC: 38 MG/DL — HIGH (ref 10–20)
BUN SERPL-MCNC: 38 MG/DL — HIGH (ref 10–20)
CALCIUM SERPL-MCNC: 9 MG/DL — SIGNIFICANT CHANGE UP (ref 8.4–10.5)
CALCIUM SERPL-MCNC: 9.2 MG/DL — SIGNIFICANT CHANGE UP (ref 8.4–10.5)
CHLORIDE SERPL-SCNC: 101 MMOL/L — SIGNIFICANT CHANGE UP (ref 98–110)
CHLORIDE SERPL-SCNC: 102 MMOL/L — SIGNIFICANT CHANGE UP (ref 98–110)
CO2 SERPL-SCNC: 21 MMOL/L — SIGNIFICANT CHANGE UP (ref 17–32)
CO2 SERPL-SCNC: 23 MMOL/L — SIGNIFICANT CHANGE UP (ref 17–32)
CREAT SERPL-MCNC: 0.9 MG/DL — SIGNIFICANT CHANGE UP (ref 0.7–1.5)
CREAT SERPL-MCNC: 1 MG/DL — SIGNIFICANT CHANGE UP (ref 0.7–1.5)
EGFR: 57 ML/MIN/1.73M2 — LOW
EGFR: 65 ML/MIN/1.73M2 — SIGNIFICANT CHANGE UP
EOSINOPHIL # BLD AUTO: 0 K/UL — SIGNIFICANT CHANGE UP (ref 0–0.7)
EOSINOPHIL # BLD AUTO: 0.02 K/UL — SIGNIFICANT CHANGE UP (ref 0–0.7)
EOSINOPHIL NFR BLD AUTO: 0 % — SIGNIFICANT CHANGE UP (ref 0–8)
EOSINOPHIL NFR BLD AUTO: 0.2 % — SIGNIFICANT CHANGE UP (ref 0–8)
GLUCOSE SERPL-MCNC: 136 MG/DL — HIGH (ref 70–99)
GLUCOSE SERPL-MCNC: 136 MG/DL — HIGH (ref 70–99)
HCT VFR BLD CALC: 31.1 % — LOW (ref 37–47)
HCT VFR BLD CALC: 32.6 % — LOW (ref 37–47)
HGB BLD-MCNC: 10 G/DL — LOW (ref 12–16)
HGB BLD-MCNC: 10.8 G/DL — LOW (ref 12–16)
IMM GRANULOCYTES NFR BLD AUTO: 0.4 % — HIGH (ref 0.1–0.3)
IMM GRANULOCYTES NFR BLD AUTO: 0.4 % — HIGH (ref 0.1–0.3)
INR BLD: 0.97 RATIO — SIGNIFICANT CHANGE UP (ref 0.65–1.3)
LYMPHOCYTES # BLD AUTO: 0.86 K/UL — LOW (ref 1.2–3.4)
LYMPHOCYTES # BLD AUTO: 1.07 K/UL — LOW (ref 1.2–3.4)
LYMPHOCYTES # BLD AUTO: 15.5 % — LOW (ref 20.5–51.1)
LYMPHOCYTES # BLD AUTO: 8.4 % — LOW (ref 20.5–51.1)
MAGNESIUM SERPL-MCNC: 1.7 MG/DL — LOW (ref 1.8–2.4)
MCHC RBC-ENTMCNC: 29.9 PG — SIGNIFICANT CHANGE UP (ref 27–31)
MCHC RBC-ENTMCNC: 30.8 PG — SIGNIFICANT CHANGE UP (ref 27–31)
MCHC RBC-ENTMCNC: 32.2 G/DL — SIGNIFICANT CHANGE UP (ref 32–37)
MCHC RBC-ENTMCNC: 33.1 G/DL — SIGNIFICANT CHANGE UP (ref 32–37)
MCV RBC AUTO: 92.8 FL — SIGNIFICANT CHANGE UP (ref 81–99)
MCV RBC AUTO: 92.9 FL — SIGNIFICANT CHANGE UP (ref 81–99)
MONOCYTES # BLD AUTO: 0.52 K/UL — SIGNIFICANT CHANGE UP (ref 0.1–0.6)
MONOCYTES # BLD AUTO: 0.57 K/UL — SIGNIFICANT CHANGE UP (ref 0.1–0.6)
MONOCYTES NFR BLD AUTO: 5.1 % — SIGNIFICANT CHANGE UP (ref 1.7–9.3)
MONOCYTES NFR BLD AUTO: 8.2 % — SIGNIFICANT CHANGE UP (ref 1.7–9.3)
NEUTROPHILS # BLD AUTO: 5.23 K/UL — SIGNIFICANT CHANGE UP (ref 1.4–6.5)
NEUTROPHILS # BLD AUTO: 8.76 K/UL — HIGH (ref 1.4–6.5)
NEUTROPHILS NFR BLD AUTO: 75.6 % — HIGH (ref 42.2–75.2)
NEUTROPHILS NFR BLD AUTO: 85.6 % — HIGH (ref 42.2–75.2)
NRBC # BLD: 0 /100 WBCS — SIGNIFICANT CHANGE UP (ref 0–0)
NRBC # BLD: 0 /100 WBCS — SIGNIFICANT CHANGE UP (ref 0–0)
PHOSPHATE SERPL-MCNC: 4.2 MG/DL — SIGNIFICANT CHANGE UP (ref 2.1–4.9)
PLATELET # BLD AUTO: 216 K/UL — SIGNIFICANT CHANGE UP (ref 130–400)
PLATELET # BLD AUTO: 229 K/UL — SIGNIFICANT CHANGE UP (ref 130–400)
PMV BLD: 9.7 FL — SIGNIFICANT CHANGE UP (ref 7.4–10.4)
PMV BLD: 9.8 FL — SIGNIFICANT CHANGE UP (ref 7.4–10.4)
POTASSIUM SERPL-MCNC: 4.1 MMOL/L — SIGNIFICANT CHANGE UP (ref 3.5–5)
POTASSIUM SERPL-MCNC: 5.4 MMOL/L — HIGH (ref 3.5–5)
POTASSIUM SERPL-SCNC: 4.1 MMOL/L — SIGNIFICANT CHANGE UP (ref 3.5–5)
POTASSIUM SERPL-SCNC: 5.4 MMOL/L — HIGH (ref 3.5–5)
PROT SERPL-MCNC: 6.4 G/DL — SIGNIFICANT CHANGE UP (ref 6–8)
PROTHROM AB SERPL-ACNC: 11 SEC — SIGNIFICANT CHANGE UP (ref 9.95–12.87)
RBC # BLD: 3.35 M/UL — LOW (ref 4.2–5.4)
RBC # BLD: 3.51 M/UL — LOW (ref 4.2–5.4)
RBC # FLD: 13.3 % — SIGNIFICANT CHANGE UP (ref 11.5–14.5)
RBC # FLD: 13.4 % — SIGNIFICANT CHANGE UP (ref 11.5–14.5)
SODIUM SERPL-SCNC: 135 MMOL/L — SIGNIFICANT CHANGE UP (ref 135–146)
SODIUM SERPL-SCNC: 136 MMOL/L — SIGNIFICANT CHANGE UP (ref 135–146)
WBC # BLD: 10.23 K/UL — SIGNIFICANT CHANGE UP (ref 4.8–10.8)
WBC # BLD: 6.92 K/UL — SIGNIFICANT CHANGE UP (ref 4.8–10.8)
WBC # FLD AUTO: 10.23 K/UL — SIGNIFICANT CHANGE UP (ref 4.8–10.8)
WBC # FLD AUTO: 6.92 K/UL — SIGNIFICANT CHANGE UP (ref 4.8–10.8)

## 2023-06-03 PROCEDURE — 86850 RBC ANTIBODY SCREEN: CPT

## 2023-06-03 PROCEDURE — 81001 URINALYSIS AUTO W/SCOPE: CPT

## 2023-06-03 PROCEDURE — C1751: CPT

## 2023-06-03 PROCEDURE — 83036 HEMOGLOBIN GLYCOSYLATED A1C: CPT

## 2023-06-03 PROCEDURE — 82550 ASSAY OF CK (CPK): CPT

## 2023-06-03 PROCEDURE — 71045 X-RAY EXAM CHEST 1 VIEW: CPT

## 2023-06-03 PROCEDURE — 86900 BLOOD TYPING SEROLOGIC ABO: CPT

## 2023-06-03 PROCEDURE — 78452 HT MUSCLE IMAGE SPECT MULT: CPT

## 2023-06-03 PROCEDURE — 85025 COMPLETE CBC W/AUTO DIFF WBC: CPT

## 2023-06-03 PROCEDURE — 73502 X-RAY EXAM HIP UNI 2-3 VIEWS: CPT | Mod: 26,LT

## 2023-06-03 PROCEDURE — 71260 CT THORAX DX C+: CPT | Mod: 26,MA

## 2023-06-03 PROCEDURE — 85730 THROMBOPLASTIN TIME PARTIAL: CPT

## 2023-06-03 PROCEDURE — 72125 CT NECK SPINE W/O DYE: CPT | Mod: 26,MA

## 2023-06-03 PROCEDURE — 99233 SBSQ HOSP IP/OBS HIGH 50: CPT

## 2023-06-03 PROCEDURE — 73564 X-RAY EXAM KNEE 4 OR MORE: CPT | Mod: 26,LT

## 2023-06-03 PROCEDURE — 97116 GAIT TRAINING THERAPY: CPT | Mod: GP

## 2023-06-03 PROCEDURE — 97162 PT EVAL MOD COMPLEX 30 MIN: CPT | Mod: GP

## 2023-06-03 PROCEDURE — 93017 CV STRESS TEST TRACING ONLY: CPT

## 2023-06-03 PROCEDURE — 80061 LIPID PANEL: CPT

## 2023-06-03 PROCEDURE — 97166 OT EVAL MOD COMPLEX 45 MIN: CPT | Mod: GO

## 2023-06-03 PROCEDURE — 84100 ASSAY OF PHOSPHORUS: CPT

## 2023-06-03 PROCEDURE — 93010 ELECTROCARDIOGRAM REPORT: CPT

## 2023-06-03 PROCEDURE — 82553 CREATINE MB FRACTION: CPT

## 2023-06-03 PROCEDURE — A9500: CPT

## 2023-06-03 PROCEDURE — 73564 X-RAY EXAM KNEE 4 OR MORE: CPT | Mod: LT

## 2023-06-03 PROCEDURE — 93306 TTE W/DOPPLER COMPLETE: CPT

## 2023-06-03 PROCEDURE — P9045: CPT

## 2023-06-03 PROCEDURE — 36415 COLL VENOUS BLD VENIPUNCTURE: CPT

## 2023-06-03 PROCEDURE — 73501 X-RAY EXAM HIP UNI 1 VIEW: CPT | Mod: LT

## 2023-06-03 PROCEDURE — 94760 N-INVAS EAR/PLS OXIMETRY 1: CPT

## 2023-06-03 PROCEDURE — P9016: CPT

## 2023-06-03 PROCEDURE — 84484 ASSAY OF TROPONIN QUANT: CPT

## 2023-06-03 PROCEDURE — 99223 1ST HOSP IP/OBS HIGH 75: CPT | Mod: 25,24

## 2023-06-03 PROCEDURE — 70450 CT HEAD/BRAIN W/O DYE: CPT | Mod: 26,MA

## 2023-06-03 PROCEDURE — 83605 ASSAY OF LACTIC ACID: CPT

## 2023-06-03 PROCEDURE — 93005 ELECTROCARDIOGRAM TRACING: CPT

## 2023-06-03 PROCEDURE — 86923 COMPATIBILITY TEST ELECTRIC: CPT

## 2023-06-03 PROCEDURE — 71045 X-RAY EXAM CHEST 1 VIEW: CPT | Mod: 26

## 2023-06-03 PROCEDURE — 36430 TRANSFUSION BLD/BLD COMPNT: CPT

## 2023-06-03 PROCEDURE — 83735 ASSAY OF MAGNESIUM: CPT

## 2023-06-03 PROCEDURE — 82962 GLUCOSE BLOOD TEST: CPT

## 2023-06-03 PROCEDURE — 80048 BASIC METABOLIC PNL TOTAL CA: CPT

## 2023-06-03 PROCEDURE — 73552 X-RAY EXAM OF FEMUR 2/>: CPT | Mod: 26,LT

## 2023-06-03 PROCEDURE — P9040: CPT

## 2023-06-03 PROCEDURE — 97530 THERAPEUTIC ACTIVITIES: CPT | Mod: GP

## 2023-06-03 PROCEDURE — 80053 COMPREHEN METABOLIC PANEL: CPT

## 2023-06-03 PROCEDURE — C9399: CPT

## 2023-06-03 PROCEDURE — 85610 PROTHROMBIN TIME: CPT

## 2023-06-03 PROCEDURE — 85027 COMPLETE CBC AUTOMATED: CPT

## 2023-06-03 PROCEDURE — C1769: CPT

## 2023-06-03 PROCEDURE — 97535 SELF CARE MNGMENT TRAINING: CPT | Mod: GO

## 2023-06-03 PROCEDURE — 74177 CT ABD & PELVIS W/CONTRAST: CPT | Mod: 26,MA

## 2023-06-03 PROCEDURE — 86901 BLOOD TYPING SEROLOGIC RH(D): CPT

## 2023-06-03 PROCEDURE — 99285 EMERGENCY DEPT VISIT HI MDM: CPT

## 2023-06-03 PROCEDURE — 87635 SARS-COV-2 COVID-19 AMP PRB: CPT

## 2023-06-03 PROCEDURE — 93280 PM DEVICE PROGR EVAL DUAL: CPT

## 2023-06-03 PROCEDURE — 97110 THERAPEUTIC EXERCISES: CPT | Mod: GP

## 2023-06-03 PROCEDURE — C1713: CPT

## 2023-06-03 RX ORDER — LISINOPRIL 2.5 MG/1
10 TABLET ORAL DAILY
Refills: 0 | Status: DISCONTINUED | OUTPATIENT
Start: 2023-06-03 | End: 2023-06-03

## 2023-06-03 RX ORDER — GABAPENTIN 400 MG/1
300 CAPSULE ORAL THREE TIMES A DAY
Refills: 0 | Status: DISCONTINUED | OUTPATIENT
Start: 2023-06-03 | End: 2023-06-04

## 2023-06-03 RX ORDER — ATORVASTATIN CALCIUM 80 MG/1
10 TABLET, FILM COATED ORAL AT BEDTIME
Refills: 0 | Status: DISCONTINUED | OUTPATIENT
Start: 2023-06-03 | End: 2023-06-03

## 2023-06-03 RX ORDER — METOPROLOL TARTRATE 50 MG
1 TABLET ORAL
Refills: 0 | DISCHARGE

## 2023-06-03 RX ORDER — MORPHINE SULFATE 50 MG/1
4 CAPSULE, EXTENDED RELEASE ORAL ONCE
Refills: 0 | Status: DISCONTINUED | OUTPATIENT
Start: 2023-06-03 | End: 2023-06-03

## 2023-06-03 RX ORDER — ATORVASTATIN CALCIUM 80 MG/1
20 TABLET, FILM COATED ORAL AT BEDTIME
Refills: 0 | Status: DISCONTINUED | OUTPATIENT
Start: 2023-06-03 | End: 2023-06-04

## 2023-06-03 RX ORDER — METOPROLOL TARTRATE 50 MG
25 TABLET ORAL DAILY
Refills: 0 | Status: DISCONTINUED | OUTPATIENT
Start: 2023-06-03 | End: 2023-06-03

## 2023-06-03 RX ORDER — AMLODIPINE BESYLATE 2.5 MG/1
5 TABLET ORAL DAILY
Refills: 0 | Status: DISCONTINUED | OUTPATIENT
Start: 2023-06-03 | End: 2023-06-03

## 2023-06-03 RX ORDER — GABAPENTIN 400 MG/1
100 CAPSULE ORAL THREE TIMES A DAY
Refills: 0 | Status: DISCONTINUED | OUTPATIENT
Start: 2023-06-03 | End: 2023-06-03

## 2023-06-03 RX ORDER — ALPRAZOLAM 0.25 MG
0.25 TABLET ORAL DAILY
Refills: 0 | Status: DISCONTINUED | OUTPATIENT
Start: 2023-06-03 | End: 2023-06-04

## 2023-06-03 RX ORDER — MORPHINE SULFATE 50 MG/1
6 CAPSULE, EXTENDED RELEASE ORAL ONCE
Refills: 0 | Status: DISCONTINUED | OUTPATIENT
Start: 2023-06-03 | End: 2023-06-03

## 2023-06-03 RX ORDER — OXYCODONE HYDROCHLORIDE 5 MG/1
5 TABLET ORAL EVERY 6 HOURS
Refills: 0 | Status: DISCONTINUED | OUTPATIENT
Start: 2023-06-03 | End: 2023-06-03

## 2023-06-03 RX ORDER — SODIUM CHLORIDE 9 MG/ML
1000 INJECTION INTRAMUSCULAR; INTRAVENOUS; SUBCUTANEOUS
Refills: 0 | Status: DISCONTINUED | OUTPATIENT
Start: 2023-06-03 | End: 2023-06-04

## 2023-06-03 RX ORDER — METOPROLOL TARTRATE 50 MG
12.5 TABLET ORAL
Refills: 0 | Status: DISCONTINUED | OUTPATIENT
Start: 2023-06-03 | End: 2023-06-03

## 2023-06-03 RX ORDER — OXYBUTYNIN CHLORIDE 5 MG
5 TABLET ORAL THREE TIMES A DAY
Refills: 0 | Status: DISCONTINUED | OUTPATIENT
Start: 2023-06-03 | End: 2023-06-04

## 2023-06-03 RX ORDER — METOPROLOL TARTRATE 50 MG
25 TABLET ORAL EVERY 12 HOURS
Refills: 0 | Status: DISCONTINUED | OUTPATIENT
Start: 2023-06-03 | End: 2023-06-04

## 2023-06-03 RX ORDER — ONDANSETRON 8 MG/1
4 TABLET, FILM COATED ORAL EVERY 6 HOURS
Refills: 0 | Status: DISCONTINUED | OUTPATIENT
Start: 2023-06-03 | End: 2023-06-04

## 2023-06-03 RX ORDER — GABAPENTIN 400 MG/1
1 CAPSULE ORAL
Qty: 0 | Refills: 0 | DISCHARGE

## 2023-06-03 RX ORDER — OXYBUTYNIN CHLORIDE 5 MG
1 TABLET ORAL
Qty: 0 | Refills: 0 | DISCHARGE

## 2023-06-03 RX ORDER — LOVASTATIN 20 MG
1 TABLET ORAL
Qty: 0 | Refills: 0 | DISCHARGE

## 2023-06-03 RX ORDER — HYDROMORPHONE HYDROCHLORIDE 2 MG/ML
0.25 INJECTION INTRAMUSCULAR; INTRAVENOUS; SUBCUTANEOUS EVERY 4 HOURS
Refills: 0 | Status: DISCONTINUED | OUTPATIENT
Start: 2023-06-03 | End: 2023-06-04

## 2023-06-03 RX ORDER — KETOROLAC TROMETHAMINE 30 MG/ML
15 SYRINGE (ML) INJECTION EVERY 6 HOURS
Refills: 0 | Status: DISCONTINUED | OUTPATIENT
Start: 2023-06-03 | End: 2023-06-04

## 2023-06-03 RX ORDER — AMLODIPINE BESYLATE 2.5 MG/1
5 TABLET ORAL DAILY
Refills: 0 | Status: DISCONTINUED | OUTPATIENT
Start: 2023-06-03 | End: 2023-06-04

## 2023-06-03 RX ORDER — CHLORTHALIDONE 50 MG
1 TABLET ORAL
Refills: 0 | DISCHARGE

## 2023-06-03 RX ORDER — MAGNESIUM SULFATE 500 MG/ML
2 VIAL (ML) INJECTION ONCE
Refills: 0 | Status: COMPLETED | OUTPATIENT
Start: 2023-06-03 | End: 2023-06-03

## 2023-06-03 RX ORDER — ENOXAPARIN SODIUM 100 MG/ML
40 INJECTION SUBCUTANEOUS EVERY 24 HOURS
Refills: 0 | Status: DISCONTINUED | OUTPATIENT
Start: 2023-06-03 | End: 2023-06-04

## 2023-06-03 RX ORDER — OXYBUTYNIN CHLORIDE 5 MG
5 TABLET ORAL THREE TIMES A DAY
Refills: 0 | Status: DISCONTINUED | OUTPATIENT
Start: 2023-06-03 | End: 2023-06-03

## 2023-06-03 RX ORDER — OXYBUTYNIN CHLORIDE 5 MG
1 TABLET ORAL
Refills: 0 | DISCHARGE

## 2023-06-03 RX ORDER — RAMIPRIL 5 MG
1 CAPSULE ORAL
Qty: 0 | Refills: 0 | DISCHARGE

## 2023-06-03 RX ORDER — ASPIRIN/CALCIUM CARB/MAGNESIUM 324 MG
81 TABLET ORAL DAILY
Refills: 0 | Status: DISCONTINUED | OUTPATIENT
Start: 2023-06-03 | End: 2023-06-04

## 2023-06-03 RX ORDER — SENNA PLUS 8.6 MG/1
2 TABLET ORAL AT BEDTIME
Refills: 0 | Status: DISCONTINUED | OUTPATIENT
Start: 2023-06-03 | End: 2023-06-04

## 2023-06-03 RX ORDER — SODIUM CHLORIDE 9 MG/ML
1000 INJECTION INTRAMUSCULAR; INTRAVENOUS; SUBCUTANEOUS
Refills: 0 | Status: DISCONTINUED | OUTPATIENT
Start: 2023-06-03 | End: 2023-06-03

## 2023-06-03 RX ORDER — SIMVASTATIN 20 MG/1
1 TABLET, FILM COATED ORAL
Refills: 0 | DISCHARGE

## 2023-06-03 RX ORDER — ACETAMINOPHEN 500 MG
650 TABLET ORAL EVERY 6 HOURS
Refills: 0 | Status: DISCONTINUED | OUTPATIENT
Start: 2023-06-03 | End: 2023-06-04

## 2023-06-03 RX ADMIN — MORPHINE SULFATE 6 MILLIGRAM(S): 50 CAPSULE, EXTENDED RELEASE ORAL at 14:14

## 2023-06-03 RX ADMIN — ATORVASTATIN CALCIUM 20 MILLIGRAM(S): 80 TABLET, FILM COATED ORAL at 22:11

## 2023-06-03 RX ADMIN — ENOXAPARIN SODIUM 40 MILLIGRAM(S): 100 INJECTION SUBCUTANEOUS at 22:11

## 2023-06-03 RX ADMIN — MORPHINE SULFATE 4 MILLIGRAM(S): 50 CAPSULE, EXTENDED RELEASE ORAL at 12:42

## 2023-06-03 RX ADMIN — SODIUM CHLORIDE 100 MILLILITER(S): 9 INJECTION INTRAMUSCULAR; INTRAVENOUS; SUBCUTANEOUS at 17:08

## 2023-06-03 RX ADMIN — MORPHINE SULFATE 4 MILLIGRAM(S): 50 CAPSULE, EXTENDED RELEASE ORAL at 13:00

## 2023-06-03 RX ADMIN — GABAPENTIN 300 MILLIGRAM(S): 400 CAPSULE ORAL at 22:39

## 2023-06-03 RX ADMIN — Medication 5 MILLIGRAM(S): at 22:11

## 2023-06-03 RX ADMIN — SODIUM CHLORIDE 50 MILLILITER(S): 9 INJECTION INTRAMUSCULAR; INTRAVENOUS; SUBCUTANEOUS at 22:42

## 2023-06-03 RX ADMIN — SENNA PLUS 2 TABLET(S): 8.6 TABLET ORAL at 22:11

## 2023-06-03 NOTE — CONSULT NOTE ADULT - SUBJECTIVE AND OBJECTIVE BOX
Patient is a 78y old  Female who presents with a chief complaint of Left femur fracture (03 Jun 2023 17:35)    HPI:  Patient is a 78-year-old female past medical history of hypertension hyperlipidemia CAD with stents neuropathy seen as a trauma consult coming in here for fall.  Patient had a mechanical fall onto her left back and femur earlier today.  Unable to ambulate afterwards.  Denies head trauma LOC or AC.  No other complaints. Trauma assessment in ED: ABCs intact , GCS 15 , AAOx3.    Patient seen and examined with sister at bedside. Patient reports that she was adjusting her bedding and fell out of bed. Denies any LOC/Dizziness/lightheadness. Has full recollection of event. He  helped get her back into bed. Few hours later, patient realized that she couldnt move her LLE, so she was brought to the ED. In the ED, patient found to have L femur fx, planned for OR with Ortho tomorrow.      PAST MEDICAL & SURGICAL HISTORY:  Hypertension  HLD (hyperlipidemia)  CAD (coronary artery disease)  OA (osteoarthritis)  Compression fracture of spine 2015 lumbar  Aortic stenosis s/p TAVR  Type 2 diabetes mellitus - diet controlled  OAB (overactive bladder)  Transient ischemic attack (TIA)  Jan 2014- with residual weakness on right leg  Falls  Neuropathy  History of coronary artery stent placement  KILEY to mLAD (Jan 2021)      History of cataract surgery  H/O oral surgery  H/op TAVR, PPM placement     FAMILY HISTORY:  FH: type 2 diabetes  grandfather    FH: stroke  mother    FH: heart disease  father, grandfather    SOCIAL HISTORY: lives at [facility], ambulates with a rollator at baseline     Allergies    Vicodin (Vomiting; Nausea)  codeine (Unknown)    Intolerances      MEDICATIONS  (STANDING):  acetaminophen     Tablet .. 650 milliGRAM(s) Oral every 6 hours  amLODIPine   Tablet 5 milliGRAM(s) Oral daily  aspirin enteric coated 81 milliGRAM(s) Oral daily  atorvastatin 20 milliGRAM(s) Oral at bedtime  calcium carbonate 1250 mG  + Vitamin D (OsCal 500 + D) 1 Tablet(s) Oral daily  enoxaparin Injectable 40 milliGRAM(s) SubCutaneous every 24 hours  gabapentin 300 milliGRAM(s) Oral three times a day  lisinopril 10 milliGRAM(s) Oral daily  metoprolol tartrate 25 milliGRAM(s) Oral daily  multivitamin 1 Tablet(s) Oral daily  oxybutynin 5 milliGRAM(s) Oral three times a day  senna 2 Tablet(s) Oral at bedtime  sodium chloride 0.9%. 1000 milliLiter(s) (100 mL/Hr) IV Continuous <Continuous>    MEDICATIONS  (PRN):  ALPRAZolam 0.25 milliGRAM(s) Oral daily PRN for anxiety  HYDROmorphone  Injectable 0.25 milliGRAM(s) IV Push every 4 hours PRN breakthrough pain  ketorolac   Injectable 15 milliGRAM(s) IV Push every 6 hours PRN Moderate Pain (4 - 6)  ondansetron Injectable 4 milliGRAM(s) IV Push every 6 hours PRN Nausea    Vital Signs Last 24 Hrs  T(C): 37.1 (03 Jun 2023 15:43), Max: 37.1 (03 Jun 2023 15:43)  T(F): 98.7 (03 Jun 2023 15:43), Max: 98.7 (03 Jun 2023 15:43)  HR: 82 (03 Jun 2023 15:43) (69 - 82)  BP: 144/65 (03 Jun 2023 15:43) (144/65 - 180/78)  BP(mean): --  RR: 18 (03 Jun 2023 15:43) (18 - 18)  SpO2: 98% (03 Jun 2023 15:43) (98% - 98%)    Parameters below as of 03 Jun 2023 15:43  Patient On (Oxygen Delivery Method): room air    LABS:                        10.8   10.23 )-----------( 216      ( 03 Jun 2023 14:33 )             32.6     135  |  102  |  38<H>  ----------------------------<  136<H>  5.4<H>   |  21  |  1.0    Ca    9.2      03 Jun 2023 12:27    TPro  6.4  /  Alb  4.0  /  TBili  0.4  /  DBili  x   /  AST  30  /  ALT  12  /  AlkPhos  66  06-03    REVIEW OF SYSTEMS:    CONSTITUTIONAL: No fever, weight loss, or fatigue  EYES: No eye pain, visual disturbances, or discharge  ENMT:  No difficulty hearing, tinnitus, vertigo; No sinus or throat pain  NECK: No pain or stiffness  BREASTS: No pain, masses, or nipple discharge  RESPIRATORY: No cough, wheezing, chills or hemoptysis; No shortness of breath  CARDIOVASCULAR: No chest pain, palpitations, dizziness, or leg swelling  GASTROINTESTINAL: No abdominal or epigastric pain. No nausea, vomiting, or hematemesis; No diarrhea or constipation. No melena or hematochezia.  GENITOURINARY: No dysuria, frequency, hematuria, or incontinence  NEUROLOGICAL: No headaches, memory loss, loss of strength, numbness, or tremors  SKIN: No itching, burning, rashes, or lesions   LYMPH NODES: No enlarged glands  ENDOCRINE: No heat or cold intolerance; No hair loss  MUSCULOSKELETAL: LLE pain extending to kneee  PSYCHIATRIC: No depression, anxiety, mood swings, or difficulty sleeping  HEME/LYMPH: No easy bruising, or bleeding gums  ALLERY AND IMMUNOLOGIC: No hives or eczema    PHYSICAL EXAM:    GENERAL: NAD, well-groomed, well-developed  HEAD:  Atraumatic, Normocephalic  EYES: EOMI,conjunctiva and sclera clear  ENMT:  Moist mucous membranes, Good dentition, No lesions  NECK: Supple, No JVD, Normal thyroid  NERVOUS SYSTEM:  Alert & Oriented X3, Good concentration; RUE tremor  CHEST/LUNG: Clear to auscultation bilaterally; No rales, rhonchi, wheezing, or rubs  HEART: Regular rate and rhythm; No murmurs, rubs, or gallops  ABDOMEN: Soft, Nontender, Nondistended; Bowel sounds present  EXTREMITIES:No clubbing, cyanosis, or edema. LLE extremity internally rotated and bent  SKIN: No rashes or lesions

## 2023-06-03 NOTE — H&P ADULT - HISTORY OF PRESENT ILLNESS
Patient is a 78-year-old female past medical history of hypertension hyperlipidemia CAD with stents neuropathy seen as a trauma consult coming in here for fall.  Patient had a mechanical fall onto her left back and femur earlier today.  Unable to ambulate afterwards.  Denies head trauma LOC or AC.  No other complaints. Trauma assessment in ED: ABCs intact , GCS 15 , AAOx3.    Patient seen and examined at bedside. HPI noted above. Patient laying in bed and in no acute distress with left leg inverted and adducted. Patient states she was adjusting a balaji on her bed when she lost balance from standing and fell on the left side of her body, she immediately felt the left lower leg pain and was unable to get up, or ambulate on her own after the fall. Xray of LLE notes left femur fracture. She denies any other injury or sites of pain. Neuro exam grossly intact. Vascular, neurological exam normal distal to injury. A/Ox3 and GCS 15. Denies fevers, chills, night sweats, chest pain. Admits to inability to ambulate.

## 2023-06-03 NOTE — H&P ADULT - NSHPPHYSICALEXAM_GEN_ALL_CORE
T(C): 37.1 (06-03-23 @ 15:43), Max: 37.1 (06-03-23 @ 15:43)  HR: 82 (06-03-23 @ 15:43) (69 - 82)  BP: 144/65 (06-03-23 @ 15:43) (144/65 - 180/78)  RR: 18 (06-03-23 @ 15:43) (18 - 18)  SpO2: 98% (06-03-23 @ 15:43) (98% - 98%)    CONSTITUTIONAL: Well groomed, no apparent distress  EYES: PERRLA and symmetric, EOMI, No conjunctival or scleral injection, non-icteric  ENMT: Oral mucosa with moist membranes. Normal dentition; no pharyngeal injection or exudates             NECK: Supple, symmetric and without tracheal deviation   RESP: No respiratory distress, no use of accessory muscles; CTA b/l, no WRR  CV: RRR, +S1S2, no MRG; no JVD; no peripheral edema  GI: Soft, NT, ND, no rebound, no guarding; no palpable masses; no hepatosplenomegaly; no hernia palpated  LYMPH: No cervical LAD or tenderness; no axillary LAD or tenderness; no inguinal LAD or tenderness  Ext:  Moving b/l upper and RLE without restriced ROM. Left leg inverted and adducted. Palpable Radial b/l UE, b/l DP palpable in LE.   SKIN: No rashes or ulcers noted; no subcutaneous nodules or induration palpable  NEURO: CN II-XII intact; normal reflexes in upper and lower extremities, sensation intact in upper and lower extremities b/l to light touch   PSYCH: Appropriate insight/judgment; A+O x 3, mood and affect appropriate, recent/remote memory intact T(C): 37.1 (06-03-23 @ 15:43), Max: 37.1 (06-03-23 @ 15:43)  HR: 82 (06-03-23 @ 15:43) (69 - 82)  BP: 144/65 (06-03-23 @ 15:43) (144/65 - 180/78)  RR: 18 (06-03-23 @ 15:43) (18 - 18)  SpO2: 98% (06-03-23 @ 15:43) (98% - 98%)    CONSTITUTIONAL: Well groomed, no apparent distress  EYES: PERRLA and symmetric, EOMI, No conjunctival or scleral injection, non-icteric  ENMT: Oral mucosa with moist membranes. Normal dentition; no pharyngeal injection or exudates             NECK: Supple, symmetric and without tracheal deviation   RESP: No respiratory distress, no use of accessory muscles; CTA b/l, no WRR  CV: RRR, +S1S2, no MRG; no JVD; no peripheral edema  GI: Soft, NT, ND, no rebound, no guarding; no palpable masses; no hepatosplenomegaly; no hernia palpated  LYMPH: No cervical LAD or tenderness; no axillary LAD or tenderness; no inguinal LAD or tenderness  Ext:  Moving b/l upper and RLE without restriced ROM. Left leg internally rotated and adducted. Palpable Radial b/l UE, b/l DP palpable in LE.   SKIN: No rashes or ulcers noted; no subcutaneous nodules or induration palpable  NEURO: CN II-XII intact; normal reflexes in upper and lower extremities, sensation intact in upper and lower extremities b/l to light touch   PSYCH: Appropriate insight/judgment; A+O x 3, mood and affect appropriate, recent/remote memory intact

## 2023-06-03 NOTE — H&P ADULT - NSICDXPASTMEDICALHX_GEN_ALL_CORE_FT
PAST MEDICAL HISTORY:  Aortic stenosis     CAD (coronary artery disease)     Compression fracture of spine     Compression fracture of spine 2015 lumbar    Falls     HLD (hyperlipidemia)     Hypertension     Neuropathy     OA (osteoarthritis)     OAB (overactive bladder)     Transient ischemic attack (TIA) Jan 2014- with residual weakness on right leg    Type 2 diabetes mellitus diet controlled

## 2023-06-03 NOTE — CONSULT NOTE ADULT - SUBJECTIVE AND OBJECTIVE BOX
TRAUMA ACTIVATION LEVEL: CONSULT  ACTIVATED BY:  ED  INTUBATED: NO      MECHANISM OF INJURY:   Mechanical fall      GCS: 15  E: 4  V: 5  M: 6    HPI:  Patient is a 78-year-old female past medical history of hypertension hyperlipidemia CAD with stents neuropathy seen as a trauma consult coming in here for fall.  Patient had a mechanical fall onto her left back and femur earlier today.  Unable to ambulate afterwards.  Denies head trauma LOC or AC.  No other complaints. Trauma assessment in ED: ABCs intact , GCS 15 , AAOx3.    Trauma surgery: Patient seen and examined at bedside. HPI noted above. Patient laying in bed and in no acute distress with left leg inverted and adducted. Patient states she was adjusting a balaji on her bed when she lost balance from standing and fell on the left side of her body, she immediately felt the left lower leg pain and was unable to get up, or ambulate on her own after the fall. Xray of LLE notes left femur fracture. She denies any other injury or sites of pain. Neuro exam grossly intact. Vascular, neurological exam normal distal to injury. A/Ox3 and GCS 15.     PAST MEDICAL & SURGICAL HISTORY:  Hypertension      HLD (hyperlipidemia)      CAD (coronary artery disease)      OA (osteoarthritis)      Compression fracture of spine  2015 lumbar      Aortic stenosis      Compression fracture of spine      Type 2 diabetes mellitus  diet controlled      OAB (overactive bladder)      Transient ischemic attack (TIA)  Jan 2014- with residual weakness on right leg      Falls      Neuropathy      History of coronary artery stent placement  KILEY to mLAD (Jan 2021)      History of cataract surgery      H/O oral surgery          Allergies    Vicodin (Vomiting; Nausea)  codeine (Unknown)    Intolerances        Home Medications:  acetaminophen 325 mg oral capsule: 1 cap(s) orally every 8 hours, As Needed (10 Mar 2021 12:29)  amLODIPine 5 mg oral tablet: 1 tab(s) orally once a day (10 Mar 2021 12:29)  aspirin 81 mg oral delayed release tablet: 1 tab(s) orally once a day (10 Mar 2021 12:29)  Caltrate 600 + D oral tablet: 1 tab(s) orally once a day (10 Mar 2021 12:29)  clopidogrel 75 mg oral tablet: 1 tab(s) orally once a day (at bedtime) (10 Mar 2021 12:29)  Ditropan 5 mg oral tablet: 1 tab(s) orally 3 times a day (10 Mar 2021 12:29)  Fish Oil 1000 mg oral capsule: 1 cap(s) orally once a day. Instructed to stop on 3/8/21. (10 Mar 2021 12:29)  gabapentin 300 mg oral capsule: 1 cap(s) orally 3 times a day (10 Mar 2021 12:29)  gabapentin 600 mg oral tablet: 1 cap(s) orally once a day (at bedtime) (15 Mar 2021 11:48)  lovastatin 40 mg oral tablet: 1 tab(s) orally once a day (at bedtime) (10 Mar 2021 12:29)  metoprolol: 0.5 tab(s) orally 2 times a day (15 Mar 2021 09:50)  Multiple Vitamins oral tablet: 1 tab(s) orally once a day. Instructed to stop on 3/8/21. (10 Mar 2021 12:29)  Osteo Bi-Flex 250 mg-200 mg oral tablet: orally once a day. Instructed to stop on 3/8/21.  (10 Mar 2021 12:29)  ramipril 1.25 mg oral tablet: 1 tab(s) orally once a day (at bedtime) (10 Mar 2021 12:29)      ROS: 10-system review is otherwise negative except HPI above.      Primary Survey:    A - airway intact  B - bilateral breath sounds and good chest rise  C - palpable pulses in all extremities  D - GCS 15 on arrival, GREENE  Exposure obtained    Vital Signs Last 24 Hrs  T(C): 36.6 (03 Jun 2023 11:33), Max: 36.6 (03 Jun 2023 11:33)  T(F): 97.9 (03 Jun 2023 11:33), Max: 97.9 (03 Jun 2023 11:33)  HR: 69 (03 Jun 2023 11:33) (69 - 69)  BP: 180/78 (03 Jun 2023 11:33) (180/78 - 180/78)  RR: 18 (03 Jun 2023 11:33) (18 - 18)  SpO2: 98% (03 Jun 2023 11:33) (98% - 98%)    Parameters below as of 03 Jun 2023 11:33  Patient On (Oxygen Delivery Method): room air        Secondary Survey:   General: NAD  HEENT: Normocephalic, atraumatic, EOMI, PEERLA. no scalp lacerations   Neck: Soft, midline trachea. no c-spine tenderness  Chest: No chest wall tenderness, no subcutaneous emphysema   Cardiac: S1, S2, RRR  Respiratory: Bilateral breath sounds, clear and equal bilaterally  Abdomen: Soft, non-distended, non-tender, no rebound, no guarding.  Groin: Normal appearing, pelvis stable   Ext:  Moving b/l upper and lower extremities. Palpable Radial b/l UE, b/l DP palpable in LE.   Back: No T/L/S spine tenderness, No palpable runoff/stepoff/deformity      ACCESS / DEVICES:  Peripheral IV      Labs:  CAPILLARY BLOOD GLUCOSE              06-03    135  |  102  |  38<H>  ----------------------------<  136<H>  5.4<H>   |  21  |  1.0      Calcium, Total Serum: 9.2 mg/dL (06-03-23 @ 12:27)      LFTs:             6.4  | 0.4  | 30       ------------------[66      ( 03 Jun 2023 12:27 )  4.0  | x    | 12                  RADIOLOGY & ADDITIONAL STUDIES:  F/u imaging  ---------------------------------------------------------------------------------------    ASSESSMENT:  Patient is a 78-year-old female past medical history of hypertension hyperlipidemia CAD with stents neuropathy seen as a trauma consult coming in here for fall found to have left femur fracture. Trauma assessment in ED: ABCs intact , GCS 15 , AAOx3,  GREENE.     Injuries identified:   - Left femur fracture    PLAN:   - Recommend consulting orthopedics for left femure fracture  - INCOMPLETE NOTE, WILL FINALIZE WITH PLANS SHORTLY    Disposition pending results of above labs and imaging  Above plan discussed with Trauma attending,  ***  , patient, patient family, and ED team  --------------------------------------------------------------------------------------  06-03-23 @ 14:32 TRAUMA ACTIVATION LEVEL: CONSULT  ACTIVATED BY:  ED  INTUBATED: NO      MECHANISM OF INJURY:   Mechanical fall      GCS: 15  E: 4  V: 5  M: 6    HPI:  Patient is a 78-year-old female past medical history of hypertension hyperlipidemia CAD with stents neuropathy seen as a trauma consult coming in here for fall.  Patient had a mechanical fall onto her left back and femur earlier today.  Unable to ambulate afterwards.  Denies head trauma LOC or AC.  No other complaints. Trauma assessment in ED: ABCs intact , GCS 15 , AAOx3.    Trauma surgery: Patient seen and examined at bedside. HPI noted above. Patient laying in bed and in no acute distress with left leg inverted and adducted. Patient states she was adjusting a balaji on her bed when she lost balance from standing and fell on the left side of her body, she immediately felt the left lower leg pain and was unable to get up, or ambulate on her own after the fall. Xray of LLE notes left femur fracture. She denies any other injury or sites of pain. Neuro exam grossly intact. Vascular, neurological exam normal distal to injury. A/Ox3 and GCS 15.     PAST MEDICAL & SURGICAL HISTORY:  Hypertension      HLD (hyperlipidemia)      CAD (coronary artery disease)      OA (osteoarthritis)      Compression fracture of spine  2015 lumbar      Aortic stenosis      Compression fracture of spine      Type 2 diabetes mellitus  diet controlled      OAB (overactive bladder)      Transient ischemic attack (TIA)  Jan 2014- with residual weakness on right leg      Falls      Neuropathy      History of coronary artery stent placement  KILEY to mLAD (Jan 2021)      History of cataract surgery      H/O oral surgery          Allergies    Vicodin (Vomiting; Nausea)  codeine (Unknown)    Intolerances        Home Medications:  acetaminophen 325 mg oral capsule: 1 cap(s) orally every 8 hours, As Needed (10 Mar 2021 12:29)  amLODIPine 5 mg oral tablet: 1 tab(s) orally once a day (10 Mar 2021 12:29)  aspirin 81 mg oral delayed release tablet: 1 tab(s) orally once a day (10 Mar 2021 12:29)  Caltrate 600 + D oral tablet: 1 tab(s) orally once a day (10 Mar 2021 12:29)  clopidogrel 75 mg oral tablet: 1 tab(s) orally once a day (at bedtime) (10 Mar 2021 12:29)  Ditropan 5 mg oral tablet: 1 tab(s) orally 3 times a day (10 Mar 2021 12:29)  Fish Oil 1000 mg oral capsule: 1 cap(s) orally once a day. Instructed to stop on 3/8/21. (10 Mar 2021 12:29)  gabapentin 300 mg oral capsule: 1 cap(s) orally 3 times a day (10 Mar 2021 12:29)  gabapentin 600 mg oral tablet: 1 cap(s) orally once a day (at bedtime) (15 Mar 2021 11:48)  lovastatin 40 mg oral tablet: 1 tab(s) orally once a day (at bedtime) (10 Mar 2021 12:29)  metoprolol: 0.5 tab(s) orally 2 times a day (15 Mar 2021 09:50)  Multiple Vitamins oral tablet: 1 tab(s) orally once a day. Instructed to stop on 3/8/21. (10 Mar 2021 12:29)  Osteo Bi-Flex 250 mg-200 mg oral tablet: orally once a day. Instructed to stop on 3/8/21.  (10 Mar 2021 12:29)  ramipril 1.25 mg oral tablet: 1 tab(s) orally once a day (at bedtime) (10 Mar 2021 12:29)      ROS: 10-system review is otherwise negative except HPI above.      Primary Survey:    A - airway intact  B - bilateral breath sounds and good chest rise  C - palpable pulses in all extremities  D - GCS 15 on arrival, GREENE  Exposure obtained    Vital Signs Last 24 Hrs  T(C): 36.6 (03 Jun 2023 11:33), Max: 36.6 (03 Jun 2023 11:33)  T(F): 97.9 (03 Jun 2023 11:33), Max: 97.9 (03 Jun 2023 11:33)  HR: 69 (03 Jun 2023 11:33) (69 - 69)  BP: 180/78 (03 Jun 2023 11:33) (180/78 - 180/78)  RR: 18 (03 Jun 2023 11:33) (18 - 18)  SpO2: 98% (03 Jun 2023 11:33) (98% - 98%)    Parameters below as of 03 Jun 2023 11:33  Patient On (Oxygen Delivery Method): room air        Secondary Survey:   General: NAD  HEENT: Normocephalic, atraumatic, EOMI, PEERLA. no scalp lacerations   Neck: Soft, midline trachea. no c-spine tenderness  Chest: No chest wall tenderness, no subcutaneous emphysema   Cardiac: S1, S2, RRR  Respiratory: Bilateral breath sounds, clear and equal bilaterally  Abdomen: Soft, non-distended, non-tender, no rebound, no guarding.  Groin: Normal appearing, pelvis stable   Ext:  Moving b/l upper and RLE without restriced ROM. Left leg inverted and adducted. Palpable Radial b/l UE, b/l DP palpable in LE.   Back: No T/L/S spine tenderness, No palpable runoff/stepoff/deformity      ACCESS / DEVICES:  Peripheral IV      Labs:  CAPILLARY BLOOD GLUCOSE              06-03    135  |  102  |  38<H>  ----------------------------<  136<H>  5.4<H>   |  21  |  1.0      Calcium, Total Serum: 9.2 mg/dL (06-03-23 @ 12:27)      LFTs:             6.4  | 0.4  | 30       ------------------[66      ( 03 Jun 2023 12:27 )  4.0  | x    | 12                  RADIOLOGY & ADDITIONAL STUDIES:  F/u imaging  ---------------------------------------------------------------------------------------    ASSESSMENT:  Patient is a 78-year-old female past medical history of hypertension hyperlipidemia CAD with stents neuropathy seen as a trauma consult coming in here for fall found to have left femur fracture. Trauma assessment in ED: ABCs intact , GCS 15 , AAOx3,  GREENE.     Injuries identified:   - Left femur fracture    PLAN:   - Recommend consulting orthopedics for left femure fracture  - INCOMPLETE NOTE, WILL FINALIZE WITH PLANS SHORTLY    Disposition pending results of above labs and imaging  Above plan discussed with Trauma attending,  ***  , patient, patient family, and ED team  --------------------------------------------------------------------------------------  06-03-23 @ 14:32 TRAUMA ACTIVATION LEVEL: CONSULT  ACTIVATED BY:  ED  INTUBATED: NO      MECHANISM OF INJURY:   Mechanical fall      GCS: 15  E: 4  V: 5  M: 6    HPI:  Patient is a 78-year-old female past medical history of hypertension hyperlipidemia CAD with stents neuropathy seen as a trauma consult coming in here for fall.  Patient had a mechanical fall onto her left back and femur earlier today.  Unable to ambulate afterwards.  Denies head trauma LOC or AC.  No other complaints. Trauma assessment in ED: ABCs intact , GCS 15 , AAOx3.    Trauma surgery: Patient seen and examined at bedside. HPI noted above. Patient laying in bed and in no acute distress with left leg inverted and adducted. Patient states she was adjusting a balaji on her bed when she lost balance from standing and fell on the left side of her body, she immediately felt the left lower leg pain and was unable to get up, or ambulate on her own after the fall. Xray of LLE notes left femur fracture. She denies any other injury or sites of pain. Neuro exam grossly intact. Vascular, neurological exam normal distal to injury. A/Ox3 and GCS 15.     PAST MEDICAL & SURGICAL HISTORY:  Hypertension      HLD (hyperlipidemia)      CAD (coronary artery disease)      OA (osteoarthritis)      Compression fracture of spine  2015 lumbar      Aortic stenosis      Compression fracture of spine      Type 2 diabetes mellitus  diet controlled      OAB (overactive bladder)      Transient ischemic attack (TIA)  Jan 2014- with residual weakness on right leg      Falls      Neuropathy      History of coronary artery stent placement  KILEY to mLAD (Jan 2021)      History of cataract surgery      H/O oral surgery          Allergies    Vicodin (Vomiting; Nausea)  codeine (Unknown)    Intolerances        Home Medications:  acetaminophen 325 mg oral capsule: 1 cap(s) orally every 8 hours, As Needed (10 Mar 2021 12:29)  amLODIPine 5 mg oral tablet: 1 tab(s) orally once a day (10 Mar 2021 12:29)  aspirin 81 mg oral delayed release tablet: 1 tab(s) orally once a day (10 Mar 2021 12:29)  Caltrate 600 + D oral tablet: 1 tab(s) orally once a day (10 Mar 2021 12:29)  clopidogrel 75 mg oral tablet: 1 tab(s) orally once a day (at bedtime) (10 Mar 2021 12:29)  Ditropan 5 mg oral tablet: 1 tab(s) orally 3 times a day (10 Mar 2021 12:29)  Fish Oil 1000 mg oral capsule: 1 cap(s) orally once a day. Instructed to stop on 3/8/21. (10 Mar 2021 12:29)  gabapentin 300 mg oral capsule: 1 cap(s) orally 3 times a day (10 Mar 2021 12:29)  gabapentin 600 mg oral tablet: 1 cap(s) orally once a day (at bedtime) (15 Mar 2021 11:48)  lovastatin 40 mg oral tablet: 1 tab(s) orally once a day (at bedtime) (10 Mar 2021 12:29)  metoprolol: 0.5 tab(s) orally 2 times a day (15 Mar 2021 09:50)  Multiple Vitamins oral tablet: 1 tab(s) orally once a day. Instructed to stop on 3/8/21. (10 Mar 2021 12:29)  Osteo Bi-Flex 250 mg-200 mg oral tablet: orally once a day. Instructed to stop on 3/8/21.  (10 Mar 2021 12:29)  ramipril 1.25 mg oral tablet: 1 tab(s) orally once a day (at bedtime) (10 Mar 2021 12:29)      ROS: 10-system review is otherwise negative except HPI above.      Primary Survey:    A - airway intact  B - bilateral breath sounds and good chest rise  C - palpable pulses in all extremities  D - GCS 15 on arrival, GREENE  Exposure obtained    Vital Signs Last 24 Hrs  T(C): 36.6 (03 Jun 2023 11:33), Max: 36.6 (03 Jun 2023 11:33)  T(F): 97.9 (03 Jun 2023 11:33), Max: 97.9 (03 Jun 2023 11:33)  HR: 69 (03 Jun 2023 11:33) (69 - 69)  BP: 180/78 (03 Jun 2023 11:33) (180/78 - 180/78)  RR: 18 (03 Jun 2023 11:33) (18 - 18)  SpO2: 98% (03 Jun 2023 11:33) (98% - 98%)    Parameters below as of 03 Jun 2023 11:33  Patient On (Oxygen Delivery Method): room air        Secondary Survey:   General: NAD  HEENT: Normocephalic, atraumatic, EOMI, PEERLA. no scalp lacerations   Neck: Soft, midline trachea. no c-spine tenderness  Chest: No chest wall tenderness, no subcutaneous emphysema   Cardiac: S1, S2, RRR  Respiratory: Bilateral breath sounds, clear and equal bilaterally  Abdomen: Soft, non-distended, non-tender, no rebound, no guarding.  Groin: Normal appearing, pelvis stable   Ext:  Moving b/l upper and RLE without restriced ROM. Left leg inverted and adducted. Palpable Radial b/l UE, b/l DP palpable in LE.   Back: No T/L/S spine tenderness, No palpable runoff/stepoff/deformity      ACCESS / DEVICES:  Peripheral IV      Labs:  CAPILLARY BLOOD GLUCOSE              06-03    135  |  102  |  38<H>  ----------------------------<  136<H>  5.4<H>   |  21  |  1.0      Calcium, Total Serum: 9.2 mg/dL (06-03-23 @ 12:27)      LFTs:             6.4  | 0.4  | 30       ------------------[66      ( 03 Jun 2023 12:27 )  4.0  | x    | 12                  RADIOLOGY & ADDITIONAL STUDIES:  F/u imaging  ---------------------------------------------------------------------------------------    ASSESSMENT:  Patient is a 78-year-old female past medical history of hypertension hyperlipidemia CAD with stents neuropathy seen as a trauma consult coming in here for fall found to have left femur fracture. Trauma assessment in ED: ABCs intact , GCS 15 , AAOx3,  GREENE.     Injuries identified:   - Left femur fracture    PLAN:   - Recommend consulting orthopedics for left femur fracture  - Admit to trauma surgery under Dr. Lea  - NPO @ [facility]  - Preop for OR tomorrow w/ ortho  - Pain control  - Home medications: reinstate as appropriate   - Repeat labs in AM  - Place murcia     Disposition pending results of above labs and imaging  Above plan discussed with Trauma attending, Dr. Lea , patient, patient family, and ED team  --------------------------------------------------------------------------------------  06-03-23 @ 14:32

## 2023-06-03 NOTE — ED PROVIDER NOTE - DIFFERENTIAL DIAGNOSIS
Encounter Date: 11/21/2019    SCRIBE #1 NOTE: Marbella NÚÑEZ am scribing for, and in the presence of, Mekhi Salcido MD.       History     Chief Complaint   Patient presents with    Cough    Sore Throat       Time seen by provider: 7:05 PM on 11/21/2019    Ulisses Forde is a 7 y.o. female who presents to the ED with complaints of cough and sore throat that started a few days ago. Symptoms worsened last night. She denies fever and chills. Denies changes in bowel movements or appetite. Patient had the flu shot. Per mother, the patient has history of strep throat. Patient is UTD on immunizations. She has no other medical concerns or complaints at this moment. PMHx includes otitis media and bronchiolitis. No pertinent SHx. NKDA.     The history is provided by the patient and the mother.     Review of patient's allergies indicates:  No Known Allergies  Past Medical History:   Diagnosis Date    Asthmatic bronchitis 8/26/13    Biaxin, Xopenex, CXR    Bronchiolitis     Eczema     FTT (failure to thrive) in child     HSV-1 (herpes simplex virus 1) infection     Otitis media     Skin abscess 1/4/13    MRSA, admit x 3d, right buttock, U/S hip nl, Vanc. surgery I&D    Stomatitis herpetiformis     Tinea corporis     URI (upper respiratory infection)     Viral gastroenteritis 8/26/13    Wheezing     albuterol     Past Surgical History:   Procedure Laterality Date    INCISION AND DRAINAGE OF WOUND Right 1/6/13    buttock/hip     Family History   Problem Relation Age of Onset    No Known Problems Mother     Asthma Father     Hypertension Father     Asthma Sister     Spina bifida Sister     Hypertension Maternal Grandmother     Cancer Maternal Grandfather     No Known Problems Paternal Grandmother     No Known Problems Paternal Grandfather     No Known Problems Sister     No Known Problems Sister     No Known Problems Brother     No Known Problems Maternal Aunt     No Known Problems Maternal  Pain level assessment complete. Patient educated on pain scale and control interventions  PRN pain medication given per patient request  Patient instructed to call out with new onset of pain or unrelieved pain. Siderails up x 2  Hourly rounding  Call light in reach  Instructed to call for assist before attempting out of bed. Remains free from falls and accidental injury at this time   Floor free from obstacles  Bed is locked and in lowest position  Adequate lighting provided  Bed alarm on, Red Falling star and Stay with Me signs posted. Uncle     No Known Problems Paternal Aunt     No Known Problems Paternal Uncle     ADD / ADHD Neg Hx     Alcohol abuse Neg Hx     Allergies Neg Hx     Autism spectrum disorder Neg Hx     Behavior problems Neg Hx     Birth defects Neg Hx     Chromosomal disorder Neg Hx     Cleft lip Neg Hx     Congenital heart disease Neg Hx     Depression Neg Hx     Diabetes Neg Hx     Early death Neg Hx     Eczema Neg Hx     Hearing loss Neg Hx     Heart disease Neg Hx     Hyperlipidemia Neg Hx     Kidney disease Neg Hx     Learning disabilities Neg Hx     Mental illness Neg Hx     Migraines Neg Hx     Neurodegenerative disease Neg Hx     Obesity Neg Hx     Seizures Neg Hx     SIDS Neg Hx     Thyroid disease Neg Hx     Other Neg Hx      Social History     Tobacco Use    Smoking status: Passive Smoke Exposure - Never Smoker    Tobacco comment: mom smokes outside   Substance Use Topics    Alcohol use: No    Drug use: No     Review of Systems   Constitutional: Negative for chills and fever.   HENT: Positive for sore throat. Negative for congestion.    Respiratory: Positive for cough. Negative for shortness of breath.    Cardiovascular: Negative for chest pain.   Gastrointestinal: Negative for abdominal pain, nausea and vomiting.   Genitourinary: Negative for difficulty urinating.   Musculoskeletal: Negative for gait problem, joint swelling and myalgias.   Skin: Negative for pallor and rash.   Neurological: Negative for weakness and headaches.   Hematological: Does not bruise/bleed easily.   Psychiatric/Behavioral: The patient is not nervous/anxious.        Physical Exam     Initial Vitals [11/21/19 1721]   BP Pulse Resp Temp SpO2   -- 97 14 99.2 °F (37.3 °C) 98 %      MAP       --         Physical Exam    Nursing note and vitals reviewed.  Constitutional: She appears well-developed and well-nourished. She is not diaphoretic. No distress.   HENT:   Head: Normocephalic and atraumatic.   Mouth/Throat:  Mucous membranes are moist. No oropharyngeal exudate, pharynx swelling or pharynx erythema. No tonsillar exudate. Oropharynx is clear.   No mucous membrane changes. Uvula midline. Posterior oropharynx without erythema, swelling, or exudate.    Eyes: Conjunctivae and EOM are normal. Pupils are equal, round, and reactive to light.   Neck: Neck supple. No neck rigidity.   Cardiovascular: Normal rate and regular rhythm. Exam reveals no gallop and no friction rub.  Pulses are palpable.    No murmur heard.  Pulmonary/Chest: Effort normal and breath sounds normal. No stridor. No respiratory distress.   Equal, bilateral breath sounds noted without wheezing.    Abdominal: Soft. Bowel sounds are normal. She exhibits no distension. There is no tenderness.   Musculoskeletal: Normal range of motion.   Neurological: She is alert.   Skin: Skin is warm and dry. Capillary refill takes less than 2 seconds. No petechiae, no purpura and no rash noted. No erythema.         ED Course   Procedures  Labs Reviewed   INFLUENZA A & B BY MOLECULAR   THROAT SCREEN, RAPID   CULTURE, STREP A,  THROAT          Imaging Results    None          Medical Decision Making:   History:   Old Medical Records: I decided to obtain old medical records.  Clinical Tests:   Lab Tests: Reviewed and Ordered  ED Management:  The patient appears to have a viral upper respiratory infection.  Based upon the history and physical exam the patient does not appear to have a serious bacterial infection such as pneumonia, sepsis, otitis media, bacterial sinusitis, strep pharyngitis, parapharyngeal or peritonsillar abscess, meningitis.  Patient appears very well and I have given specific return precautions to the parents.  The patient does not appear to need antibiotics at this time and I instructed parents on proper use of OTC medicaitons. Parents advised to follow up with pediatrician in 1-2 days for recheck. They verbalized understanding of all instructions.               Scribe Attestation:   Scribe #1: I performed the above scribed service and the documentation accurately describes the services I performed. I attest to the accuracy of the note.        Attending Attestation:     Physician Attestation for Scribe:    I, Dr. Mekhi Salcido, personally performed the services described in this documentation.   All medical record entries made by the scribe were at my direction and in my presence.   I have reviewed the chart and agree that the record is accurate and complete.   Mekhi Salcido MD  9:22 AM 11/22/2019     DISCLAIMER: This note was prepared with [employer] voice recognition transcription software. Garbled syntax, mangled pronouns, and other bizarre constructions may be attributed to that software system.                      Clinical Impression:       ICD-10-CM ICD-9-CM   1. Viral URI with cough J06.9 465.9    B97.89          Disposition:   Disposition: Discharged  Condition: Stable                     Mekhi Salcido MD  11/22/19 0937     Hip fracture femur fracture other traumatic injuries Differential Diagnosis

## 2023-06-03 NOTE — ED PROVIDER NOTE - CLINICAL SUMMARY MEDICAL DECISION MAKING FREE TEXT BOX
.    78-year-old female PMH as noted, presents with left back and left thigh pain status post mechanical fall today left leg.  Headache neck pain or other significant injury.  No chest pain or shortness of breath.      Exam as noted, patient indicates pain at left hip/thigh, + swelling there, no erythema, sensation and pulses distally.    All available lab tests, imaging tests, and EKGs independently reviewed and interpreted by me. xray and CT imaging show L femur frx. Ortho and trauma consulted. No acute ED events. Pt got ivf, analgesia. Will admit pt to surgery, ortho to follow for operative management,     .

## 2023-06-03 NOTE — CONSULT NOTE ADULT - SUBJECTIVE AND OBJECTIVE BOX
----------------    Ordered signed.  Thanks!         Orthopaedic Surgery Consult Note    Name: Maggie Zamora   Reason for Consult: Left subtrochanteric fracture     79yo Female with a PMH of HTN, HLD, CAD s/p cardiac cath with KILEY placement seen in AC for evaluation of a left hip fracture.  Patient suffered mechanical fall earlier today landing on left hip.  Denies any HT or LOC.  On low dose ASA.  Isolated left hip pain.  Denies any additional injuries or pain.  Ambulates with walker at baseline.  Lives in assisted living facility with .  Does not follow cardiologist.      PMH/PSH  FH: type 2 diabetes    FH: stroke    FH: heart disease    MEWS Score    Hypertension    HLD (hyperlipidemia)    CAD (coronary artery disease)    OA (osteoarthritis)    Compression fracture of spine    Aortic stenosis    Compression fracture of spine    Type 2 diabetes mellitus    OAB (overactive bladder)    Transient ischemic attack (TIA)    Falls    Neuropathy    History of coronary artery stent placement    History of cataract surgery    H/O oral surgery    FALL PT NOW  [facility]    90+    SysAdmin_VstLnk        Medications      Home Medications:  acetaminophen 325 mg oral capsule: 1 cap(s) orally every 8 hours, As Needed (10 Mar 2021 12:29)  amLODIPine 5 mg oral tablet: 1 tab(s) orally once a day (10 Mar 2021 12:29)  aspirin 81 mg oral delayed release tablet: 1 tab(s) orally once a day (10 Mar 2021 12:29)  Caltrate 600 + D oral tablet: 1 tab(s) orally once a day (10 Mar 2021 12:29)  clopidogrel 75 mg oral tablet: 1 tab(s) orally once a day (at bedtime) (10 Mar 2021 12:29)  Ditropan 5 mg oral tablet: 1 tab(s) orally 3 times a day (10 Mar 2021 12:29)  Fish Oil 1000 mg oral capsule: 1 cap(s) orally once a day. Instructed to stop on 3/8/21. (10 Mar 2021 12:29)  gabapentin 300 mg oral capsule: 1 cap(s) orally 3 times a day (10 Mar 2021 12:29)  gabapentin 600 mg oral tablet: 1 cap(s) orally once a day (at bedtime) (15 Mar 2021 11:48)  lovastatin 40 mg oral tablet: 1 tab(s) orally once a day (at bedtime) (10 Mar 2021 12:29)  metoprolol: 0.5 tab(s) orally 2 times a day (15 Mar 2021 09:50)  Multiple Vitamins oral tablet: 1 tab(s) orally once a day. Instructed to stop on 3/8/21. (10 Mar 2021 12:29)  Osteo Bi-Flex 250 mg-200 mg oral tablet: orally once a day. Instructed to stop on 3/8/21.  (10 Mar 2021 12:29)  ramipril 1.25 mg oral tablet: 1 tab(s) orally once a day (at bedtime) (10 Mar 2021 12:29)        Allergies  Vicodin (Vomiting; Nausea)  codeine (Unknown)        T(C): 36.6 (06-03-23 @ 11:33), Max: 36.6 (06-03-23 @ 11:33)  HR: 69 (06-03-23 @ 11:33) (69 - 69)  BP: 180/78 (06-03-23 @ 11:33) (180/78 - 180/78)  RR: 18 (06-03-23 @ 11:33) (18 - 18)  SpO2: 98% (06-03-23 @ 11:33) (98% - 98%)    P/E:  AOx3, NAD  Non-labored breathing    LLE  Skin intact  Leg shortened and internally rotated   No swelling/erythema  Moderate ecchymosis noted  No deformity/laceration/abrasion noted  FAROM of knee  Compartments soft and compressible, no pain w/ passive stretch of digits  SILT sp/dp/t/sural/saph  Firing ta/ehl/fhl/gs  DP pulse 2+, cap refill <2        Labs    06-03    135  |  102  |  38<H>  ----------------------------<  136<H>  5.4<H>   |  21  |  1.0    Ca    9.2      03 Jun 2023 12:27    TPro  6.4  /  Alb  4.0  /  TBili  0.4  /  DBili  x   /  AST  30  /  ALT  12  /  AlkPhos  66  06-03    LIVER FUNCTIONS - ( 03 Jun 2023 12:27 )  Alb: 4.0 g/dL / Pro: 6.4 g/dL / ALK PHOS: 66 U/L / ALT: 12 U/L / AST: 30 U/L / GGT: x               Imaging:  Multiple views of left hip demonstrates a subtrochanteric fracture       A/P:  79yo Female w/ a left subtroch fracture     Completion imaging: R Knee  Preop labs: bmp, cbc, coags, type and screen x2, ekg, cxr  Admit for surgery  Plan for OR with Orthopedics tomorrow 6/4  Medicine clearance   Cardiology clearance   NPO@[facility]  DVT ppx: Please give stat dose of HSQ or LVX and then hold further AC for surgery   Pain control  Home medications: reinstate as appropriate   Repeat labs in AM  Place murcia   Trauma consult  Ortho to follow     Oliverio Christie MD  Orthopedic Surgery Resident PGY3

## 2023-06-03 NOTE — ED PROVIDER NOTE - CARE PLAN
1 Principal Discharge DX:	Fall  Secondary Diagnosis:	Fracture, femur, shaft  Secondary Diagnosis:	Hip fracture, left

## 2023-06-03 NOTE — ED ADULT NURSE NOTE - OBJECTIVE STATEMENT
OTIS from [facility] s/p slip and fall OOB. Endorses left hip and leg pain. Denies hitting head/LOC/AC therapy.

## 2023-06-03 NOTE — ED PROVIDER NOTE - OBJECTIVE STATEMENT
78-year-old female past medical history of hypertension hyperlipidemia CAD with stents neuropathy coming in here for fall.  Patient had a mechanical fall onto her left back and femur earlier today.  Unable to ambulate afterwards.  Denies head trauma LOC or AC.  No other complaint

## 2023-06-03 NOTE — H&P ADULT - ATTENDING COMMENTS
Trauma Attending H&P Attestation    Patient seen and evaluated with the trauma team in the trauma bay upon arrival. All pertinent labs and radiographic imaging reviewed, pending final reports. Outpatient medications reviewed, including the presence of anticoagulants, if applicable. I agree with the resident's note above, including the physical exam findings, assessment and plan as documented with the following adjustments.     Trauma Level: [ ] Code  [ ] Alert  [x ] Consult [ ] Transfer in  Patient is seen at the bedside at  15:42  Activation by:  [x ] ED physician [ ] EMS  Intubated in Field? [ ] Yes [x ] No  Intubated in ED? [ ] Yes [x ] No  Intubated in Trauma [place]? [ ] Yes [x ] No    RAFAEL NO Patient is a 78y old  Female who presents with a chief complaint of Left femur fracture due to fall from standing      Patient presented with GCS [15 ]  upon arrival to the trauma bay.  Allergies  Vicodin (Vomiting; Nausea)  codeine (Unknown)  Intolerances    On AC/Antiplatelets [ ] Yes [x ] No              [ ] NOVACs, [ ] Coumadin, [ ] ASA, [ ] Antiplatelets   Vital Signs Last 24 Hrs  T(C): 37.1 (03 Jun 2023 15:43), Max: 37.1 (03 Jun 2023 15:43)  T(F): 98.7 (03 Jun 2023 15:43), Max: 98.7 (03 Jun 2023 15:43)  HR: 82 (03 Jun 2023 15:43) (69 - 82)  BP: 144/65 (03 Jun 2023 15:43) (144/65 - 180/78)  BP(mean): --  RR: 18 (03 Jun 2023 15:43) (18 - 18)  SpO2: 98% (03 Jun 2023 15:43) (98% - 98%)    Parameters below as of 03 Jun 2023 15:43  Patient On (Oxygen Delivery Method): room air    PE: left lower extremity internally rotated and shortened   Assessment: Fall with left proximal femur fracture  PLAN  - Admit to Trauma service  - supportive care  - GI/DVT prophylaxis  - pain management  - repeat studies as needed  - complete and follow up on trauma work up included but not limites to                          [x ] CXR [x ] PXR [x ] Extremities X-RAYs                          [x ] NCHCT [x ] C-Spine CT [x ] CT Chest [x ] CT Abdomen/Pelvis                          [x ] FAST [ ] Other                          [x ] Trauma Labs    [ ] Toxicology   - Follow up Consults  [ ] Neurosurgery [x ] Orthopaedics [ ] Plastics [ ] Fascial/OMFS [ ] Opthalmology   [ ] Urology  [ ] ENT                                          [x ] Medicine [ ] Geriatrics [x ] Cardiology/EP [ ] Hospice/Palliative Care                                        [ ] Pediatric ICU  [ ] SICU/SDU [ ] Burn/Burn ICU  - IV ABx give as indicated [ ] Yes [x ] No  - Tetanus given as indicated [ ] Yes [x ] No  - Seizures prophylaxis [ ] Yes,  [x ] No    Nohelia Lea MD, FACS  Trauma/ACS/Surgical Critical Care Attending

## 2023-06-03 NOTE — H&P ADULT - NSHPLABSRESULTS_GEN_ALL_CORE
LABS:  CAPILLARY BLOOD GLUCOSE                              10.8   10.23 )-----------( 216      ( 03 Jun 2023 14:33 )             32.6     06-03    135  |  102  |  38<H>  ----------------------------<  136<H>  5.4<H>   |  21  |  1.0    Ca    9.2      03 Jun 2023 12:27    TPro  6.4  /  Alb  4.0  /  TBili  0.4  /  DBili  x   /  AST  30  /  ALT  12  /  AlkPhos  66  06-03        Radiology:  < from: Xray Hip 2-3 Views, Left (06.03.23 @ 13:13) >      ACC: [id number] EXAM:  XR HIP 2-3V LT   ORDERED BY: JASON IBRAHIM     PROCEDURE DATE:  06/03/2023          INTERPRETATION:  Clinical History / Reason for exam: Post fall    AP views of the pelvis and frog-leg lateral view of the left hip.    COMPARISON: None    Findings/  impression: Acute left proximal femoral shaft fracture demonstrating 4 cm   lateral displacement and lateral apex angulation. Severe osteoarthritis   of the bilateral hips, with avascular necrosis and chondral collapse of   theleft femoral head. Osteoarthritis of the lower lumbar spine,   sacroiliac joints and pubic symphysis. LABS:  CAPILLARY BLOOD GLUCOSE                        10.8   10.23 )-----------( 216      ( 03 Jun 2023 14:33 )             32.6     06-03    135  |  102  |  38<H>  ----------------------------<  136<H>  5.4<H>   |  21  |  1.0    Ca    9.2      03 Jun 2023 12:27    TPro  6.4  /  Alb  4.0  /  TBili  0.4  /  DBili  x   /  AST  30  /  ALT  12  /  AlkPhos  66  06-03        Radiology:  < from: Xray Hip 2-3 Views, Left (06.03.23 @ 13:13) >      ACC: [id number] EXAM:  XR HIP 2-3V LT   ORDERED BY: JASON IBRAHIM     PROCEDURE DATE:  06/03/2023          INTERPRETATION:  Clinical History / Reason for exam: Post fall    AP views of the pelvis and frog-leg lateral view of the left hip.    COMPARISON: None    Findings/  impression: Acute left proximal femoral shaft fracture demonstrating 4 cm   lateral displacement and lateral apex angulation. Severe osteoarthritis   of the bilateral hips, with avascular necrosis and chondral collapse of   theleft femoral head. Osteoarthritis of the lower lumbar spine,   sacroiliac joints and pubic symphysis.

## 2023-06-03 NOTE — ED ADULT TRIAGE NOTE - CHIEF COMPLAINT QUOTE
OTIS from [facility] s/p slip and fall while getting out bed, c/o left hip and left leg pain, denies head injury, denies A/C use

## 2023-06-03 NOTE — H&P ADULT - ASSESSMENT
ASSESSMENT:  Patient is a 78-year-old female past medical history of hypertension hyperlipidemia CAD with stents neuropathy seen as a trauma consult coming in here for fall found to have left femur fracture. Trauma assessment in ED: ABCs intact , GCS 15 , AAOx3,  GREENE.     Injuries identified:   - Left femur fracture    PLAN:   - Recommend consulting orthopedics for left femur fracture  - Admit to trauma surgery under Dr. Lea  - NPO @ MN  - Preop for OR tomorrow w/ ortho  - Pain control  - Home medications: reinstate as appropriate   - Repeat labs in AM  - Place murcia     Above plan discussed with Trauma attending, Dr. Lea , patient, patient family, and ED team ASSESSMENT:  Patient is a 78-year-old female past medical history of hypertension hyperlipidemia CAD with stents neuropathy seen as a trauma consult coming in here for fall found to have left femur fracture. Trauma assessment in ED: ABCs intact , GCS 15 , AAOx3,  GERENE.     Injuries identified:   - Left femur fracture    PLAN:   - Recommend consulting orthopedics for left femur fracture  - Admit to trauma surgery under Dr. Lea  - NPO with IVF  - Preop for OR tomorrow w/ ortho  - Pain control  - Bowel regimen  - Home medications: reinstate as appropriate   - Medical and Cardiac clearances prior to OR  - Medical comanagement  - Monitor I/Os  - VTE ppx    Above plan discussed with Trauma attending, Dr. Lea , patient, patient family, and ED team    Fatigue: How much time during the previous 4 weeks did you feel tired?   All or most of the time [   ] Yes (1pt)    [ x ] No  (0pts)  Resistance: Do you have any difficulty walking up 10 steps alone without resting and without aids? [ x  ] Yes (1pt)    [  ] No  (0pts)  Ambulation: Do you have any difficulty walking several hundred yards alone without aids? [ x  ] Yes (1pt)    [  ] No  (0pts)  Illness: how many illnesses do you have out of list of 11 total? [   ] 5 or more (1pt) [ x ] < 5 (0pts)  Loss of weight: Have you had weight loss of 5% or more? [   ] Yes (1pt)    [ x ] No  (0pts)    Total Score: 2    Score 1-2: Consult medical comangement  Score 3-4: Consult Geriatric service   Score 5: Consult Palliative service [phone]    Jenna Merino G, Franki LY, Shari HILLS, Erick SARMIENTO. Frality: toward a clinical definition. J AM Med Dir Assoc. 2008; 9 (2): 71-72  Gerardo JE, Adina TK, Norman DK. A simple frailty questionnaire (FRAIL) predicts outcomes in middle aged  Americans. J Nutr Health Aging. 2012; 16 (7): 601-608

## 2023-06-03 NOTE — ED ADULT NURSE NOTE - CHIEF COMPLAINT QUOTE
OTIS from [facility] s/p slip and fall while getting out bed, c/o left hip and left leg pain, denies head injury, denies A/C use

## 2023-06-03 NOTE — ED PROVIDER NOTE - PROGRESS NOTE DETAILS
xrays concerning for femur fracture, trauma and ortho consulted, likely or tomorrow- SD Resident MDM: 78-year-old female past medical history of hypertension hyperlipidemia CAD here with fall.  Vital signs reviewed.  Physical exam concerning for left hip and femur tenderness.  Plan: Labs x-ray CTs.  X-rays concerning for hip and femur fracture.  Trauma consult Ortho consult.  Will be admitted to trauma service under Dr. Lea, trauma team will follow CAT scan results-Authored by Kathy Hatfield

## 2023-06-03 NOTE — CONSULT NOTE ADULT - ASSESSMENT
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 [facility] consulted for risk stratification    At baseline, patient lives at [facility], ambulates with a rollator    Left Subtrochanteric fracture s/p fall  AS s/p TAVr, complicated by CHB s/p PPM 2021 - has not followed with cardio in >1 year  Diet controlled DM  HTN  DLD  neuropathy  HO TIA    Would recommend EKG, PPM interrogation and 2d echo prior to OR  Currently patient denies any CP, SOB, dizziness or lightheadedness  imaging/labs reviewed  METS<4, RCRI class III risk, patient is an elevated risk for orthopedic surgery  The decision to proceed with the surgery/procedure is made by the performing physician and the patient.   Hold lisinopril for hyperkalemia  Patient is ordered for Metoprolol [id number] daily, however it is generally given 2-3 x day, succinate is given once daily, please clarify with patient home meds   Cautious with IVF to avoid fluid overload    Please call if any questions,  Dr Annia Garcia MD  [phone]

## 2023-06-03 NOTE — CONSULT NOTE ADULT - ASSESSMENT
* SUMMARY:    * Patient-based characteristics (Functional capacity)  Patient is able to achieve more than 4 MET (walk 4 blocks, climb 2 flights of stairs, etc...)          Y [] / N [x]    High-risk patient features:  - Recent (<30 days) or active MI          Y [] / N [X]  - Unstable or severe angina          Y [] / N [X]  - Decompensated heart failure, or worsening or new-onset heart failure          Y [] / N [X]  - Severe valvular disease        ?  - Significant arrhythmia (Tachy- or Bradyarrhythmia)          Y [] / N [X]    * Surgery/Procedure-based characteristics (Type of surgery)  - Low-risk procedure (outpatient procedure, elective, endoscopy, etc...)          Y [] / N []  - Elevated or Moderate-risk procedure (Inpatient)          Y [x] / N []  - High-risk procedure (urgent/emergent procedure, Intrathoracic, vascular, etc...)          Y [] / N []    * Revised Cardiac Risk Index (RCRI)  1- History of ischemic heart disease          Y [x] / N []  2- History of congestive heart failure          Y [] / N [X]  3- History of stroke/TIA          Y [] / N [X]  4- History of insulin-dependent diabetes          Y [] / N [X]  5- Chronic kidney disease (Cr >2mg/dL)          Y [] / N [X]  6- Undergoing suprainguinal vascular, intraperitoneal, or intrathoracic surgery          Y [] / N [X]    Class I risk (Zero factors) --> 3.9% (30-day risk of death, MI, or cardiac arrest)  Class II risk (One factor) --> 6% risk (30-day risk of death, MI, or cardiac arrest)  Class III risk (Two factors) --> 10.1% risk (30-day risk of death, MI, or cardiac arrest)  Class IV risk (Three factors or more) --> >15% risk (30-day risk of death, MI, or cardiac arrest)    * IMPRESSION   CAD s/p PCI 2021  AS s/p TAVR  Moderate MS mean Gradient 5 in 2021  Significant concentric LVH with LVOT Obstruction peak Gradient 100 in 2021    RECOMMENDATIONS  check EKG  check TTE prior to the surgery  pt is not cleared for the surgery until TTE is performed. Pt will likely need cardiac anesthesia for surgery if significant LVOT obstruction is present  avoid dehydration/hypovolemia  c/w aspirin, lipitor  c/w metoprolol 25mg BID  c/w lisinopril and Amlodipine  use Jakob for hypotension if needed  will discuss with attending * SUMMARY:    * Patient-based characteristics (Functional capacity)  Patient is able to achieve more than 4 MET (walk 4 blocks, climb 2 flights of stairs, etc...)          Y [] / N [x]    High-risk patient features:  - Recent (<30 days) or active MI          Y [] / N [X]  - Unstable or severe angina          Y [] / N [X]  - Decompensated heart failure, or worsening or new-onset heart failure          Y [] / N [X]  - Severe valvular disease        ?  - Significant arrhythmia (Tachy- or Bradyarrhythmia)          Y [] / N [X]    * Surgery/Procedure-based characteristics (Type of surgery)  - Low-risk procedure (outpatient procedure, elective, endoscopy, etc...)          Y [] / N []  - Elevated or Moderate-risk procedure (Inpatient)          Y [x] / N []  - High-risk procedure (urgent/emergent procedure, Intrathoracic, vascular, etc...)          Y [] / N []    * Revised Cardiac Risk Index (RCRI)  1- History of ischemic heart disease          Y [x] / N []  2- History of congestive heart failure          Y [] / N [X]  3- History of stroke/TIA          Y [] / N [X]  4- History of insulin-dependent diabetes          Y [] / N [X]  5- Chronic kidney disease (Cr >2mg/dL)          Y [] / N [X]  6- Undergoing suprainguinal vascular, intraperitoneal, or intrathoracic surgery          Y [] / N [X]    Class I risk (Zero factors) --> 3.9% (30-day risk of death, MI, or cardiac arrest)  Class II risk (One factor) --> 6% risk (30-day risk of death, MI, or cardiac arrest)  Class III risk (Two factors) --> 10.1% risk (30-day risk of death, MI, or cardiac arrest)  Class IV risk (Three factors or more) --> >15% risk (30-day risk of death, MI, or cardiac arrest)    * IMPRESSION   CAD s/p PCI 2021  AS s/p TAVR  Moderate MS mean Gradient 5 in 2021  Significant concentric LVH with LVOT Obstruction peak Gradient 100 in 2021    RECOMMENDATIONS  check EKG  check TTE prior to the surgery, if possible  Regardless, patient is high risk for this surgery; would recommend cardiac anesthesia  avoid dehydration/hypovolemia  c/w aspirin, lipitor  c/w metoprolol 25mg BID  c/w lisinopril and Amlodipine  use Jakob for hypotension if needed  will discuss with attending

## 2023-06-03 NOTE — CONSULT NOTE ADULT - SUBJECTIVE AND OBJECTIVE BOX
Outpt cardiologist:    HPI:  Patient is a 78-year-old female past medical history of hypertension hyperlipidemia CAD with stents neuropathy seen as a trauma consult coming in here for fall.  Patient had a mechanical fall onto her left back and femur earlier today.  Unable to ambulate afterwards.  Denies head trauma LOC or AC.  No other complaints. Trauma assessment in ED: ABCs intact , GCS 15 , AAOx3.    Patient seen and examined at bedside. HPI noted above. Patient laying in bed and in no acute distress with left leg inverted and adducted. Patient states she was adjusting a balaji on her bed when she lost balance from standing and fell on the left side of her body, she immediately felt the left lower leg pain and was unable to get up, or ambulate on her own after the fall. Xray of LLE notes left femur fracture. She denies any other injury or sites of pain. Neuro exam grossly intact. Vascular, neurological exam normal distal to injury. A/Ox3 and GCS 15. Denies fevers, chills, night sweats, chest pain. Admits to inability to ambulate.  (03 Jun 2023 15:54)      ---  cardio fellow additional notes:        PAST MEDICAL & SURGICAL HISTORY  Hypertension    HLD (hyperlipidemia)    CAD (coronary artery disease)    OA (osteoarthritis)    Compression fracture of spine  2015 lumbar    Aortic stenosis    Compression fracture of spine    Type 2 diabetes mellitus  diet controlled    OAB (overactive bladder)    Transient ischemic attack (TIA)  Jan 2014- with residual weakness on right leg    Falls    Neuropathy    History of coronary artery stent placement  KILEY to mLAD (Jan 2021)    History of cataract surgery    H/O oral surgery        FAMILY HISTORY:  FAMILY HISTORY:  FH: type 2 diabetes  grandfather    FH: stroke  mother    FH: heart disease  father, grandfather        SOCIAL HISTORY:  Social History:      ALLERGIES:  Vicodin (Vomiting; Nausea)  codeine (Unknown)      MEDICATIONS:  acetaminophen     Tablet .. 650 milliGRAM(s) Oral every 6 hours  amLODIPine   Tablet 5 milliGRAM(s) Oral daily  aspirin enteric coated 81 milliGRAM(s) Oral daily  atorvastatin 20 milliGRAM(s) Oral at bedtime  calcium carbonate 1250 mG  + Vitamin D (OsCal 500 + D) 1 Tablet(s) Oral daily  enoxaparin Injectable 40 milliGRAM(s) SubCutaneous every 24 hours  gabapentin 300 milliGRAM(s) Oral three times a day  lisinopril 10 milliGRAM(s) Oral daily  metoprolol tartrate 25 milliGRAM(s) Oral daily  multivitamin 1 Tablet(s) Oral daily  oxybutynin 5 milliGRAM(s) Oral three times a day  senna 2 Tablet(s) Oral at bedtime  sodium chloride 0.9%. 1000 milliLiter(s) (100 mL/Hr) IV Continuous <Continuous>    PRN:  ALPRAZolam 0.25 milliGRAM(s) Oral daily PRN  HYDROmorphone  Injectable 0.25 milliGRAM(s) IV Push every 4 hours PRN  ketorolac   Injectable 15 milliGRAM(s) IV Push every 6 hours PRN  ondansetron Injectable 4 milliGRAM(s) IV Push every 6 hours PRN      HOME MEDICATIONS:  Home Medications:  acetaminophen 325 mg oral capsule: 1 cap(s) orally every 8 hours, As Needed (10 Mar 2021 12:29)  ALPRAZolam 0.25 mg oral tablet: 1 orally as needed for  anxiety (03 Jun 2023 17:09)  amLODIPine 5 mg oral tablet: 1 tab(s) orally once a day (10 Mar 2021 12:29)  aspirin 81 mg oral delayed release tablet: 1 tab(s) orally once a day (10 Mar 2021 12:29)  Caltrate 600 + D oral tablet: 1 tab(s) orally once a day (10 Mar 2021 12:29)  chlorthalidone 25 mg oral tablet: 1 orally once a day (03 Jun 2023 17:09)  Fish Oil 1000 mg oral capsule: 1 cap(s) orally once a day. Instructed to stop on 3/8/21. (10 Mar 2021 12:29)  gabapentin 300 mg oral capsule: 1 cap(s) orally 3 times a day (10 Mar 2021 12:29)  lisinopril 10 mg oral tablet: 1 orally once a day (03 Jun 2023 17:04)  metoprolol tartrate 25 mg oral tablet: 1 orally once a day (03 Jun 2023 17:09)  Multiple Vitamins oral tablet: 1 tab(s) orally once a day. Instructed to stop on 3/8/21. (10 Mar 2021 12:29)  Osteo Bi-Flex 250 mg-200 mg oral tablet: orally once a day. Instructed to stop on 3/8/21.  (10 Mar 2021 12:29)  oxyBUTYnin 5 mg oral tablet: 1 orally 3 times a day Three times per day Every day at 8:00Am, 12PM, 5PM (03 Jun 2023 17:09)  simvastatin 40 mg oral tablet: 1 orally once a day (03 Jun 2023 17:09)      VITALS:   T(F): 98.7 (06-03 @ 15:43), Max: 98.7 (06-03 @ 15:43)  HR: 82 (06-03 @ 15:43) (69 - 82)  BP: 144/65 (06-03 @ 15:43) (144/65 - 180/78)  BP(mean): --  RR: 18 (06-03 @ 15:43) (18 - 18)  SpO2: 98% (06-03 @ 15:43) (98% - 98%)    I&O's Summary      REVIEW OF SYSTEMS:  CONSTITUTIONAL: No weakness, fevers or chills  HEENT: No visual changes, neck/ear pain  RESPIRATORY: No cough, sob  CARDIOVASCULAR: See HPI  GASTROINTESTINAL: No abdominal pain. No nausea, vomiting, diarrhea   GENITOURINARY: No dysuria, frequency or hematuria  NEUROLOGICAL: No new focal deficits  SKIN: No new rashes    PHYSICAL EXAM:  General: Not in distress.  Non-toxic appearing.   HEENT: EOMI  Cardio: regular, S1, S2, no murmur  Pulm: B/L BS.  No wheezing / crackles / rales  Abdomen: Soft, non-tender, non-distended. Normoactive bowel sounds  Extremities: No edema b/l le  Neuro: A&O x3. No focal deficits    LABS:                        10.8   10.23 )-----------( 216      ( 03 Jun 2023 14:33 )             32.6     06-03    135  |  102  |  38<H>  ----------------------------<  136<H>  5.4<H>   |  21  |  1.0    Ca    9.2      03 Jun 2023 12:27    TPro  6.4  /  Alb  4.0  /  TBili  0.4  /  DBili  x   /  AST  30  /  ALT  12  /  AlkPhos  66  06-03              Troponin trend:            RADIOLOGY:  -CXR:  -TTE:  -CCTA:  -STRESS TEST: < from: CT Heart without Coronaries w/ IV Cont (01.21.21 @ 11:31) >  INTERPRETATION:  Indication: Severe aortic stenosis referred for TAVR evaluation.    Procedure:  Informed consent was obtained from the patient and there were no complications during the procedure. ECG-gated 320-multidetector computed tomography of the heart was performed with a contrast injection of 70 mL of Omnipaque 350. CT full chest/abdomen/pelvis was also performed. The patient was not pretreated with any medications. Two- and three-dimensional images were reconstructed using a [employer] Workstation. The image quality is good.    FINDINGS:    NON-CARDIAC:  Scoliosis. Severe degenerative changes of bilateral hip joints, left greater than the right. The chest, abdomen and pelvis are otherwise unremarkable.    CARDIOVASCULAR:  Left atrium is mildly dilated. Left ventricular hypertrophy.  This study was not optimized for coronary artery evaluation. Coronary artery calcifications are noted in the coronary arteries.  Please refer to invasive coronary angiography performed pre-TAVR for coronary artery assessment. There is no pericardial effusion. There is no left atrial appendage thrombus. No thoracic aortic dissection or aneurysm is noted.    Aortic valve: Severe calcification (Agatston calcium score 4167)  Aortic root: Moderate calcification  Ascending aorta: No calcification  Aortic arch: Mild calcification  Descending thoracic aorta: Mild calcification  Abdominal aorta: Mild calcification      AORTIC ROOT MEASUREMENTS    Major aortic annulus diameter (systole, mm): 22.4  Perpendicular minor aortic annulus diameter (systole, mm): 21.3  Aortic annulus perimeter (systole, mm): 72  Aortic annulus area (systole, mm2): 401    LVOT minimum diameter (systole, mm): 19.5  LVOT 90 cross (systole, mm): 24.9  LVOT calcification: Moderate    Distance from Aortic annulus to Left Main coronary artery (diastole, mm): 16.5  Distance from Aortic annulus to Right Coronary artery (diastole, mm): 9.5    Sinus of Valsalva diameter, Right coronary (diastole, mm): 28.2  Sinus of Valsalva diameter, Left coronary (diastole, mm): 29.7  Sinus of Valsalva diameter, Non coronary (diastole, mm): 29    Diameter at the sinotubular junction (diastole, mm): 26.5 x 28  Maximum ascending aorta diameter at 40 mm above annulus (diastole, mm): 31.6 x 31.1  Aortic root angulation (diastole, degrees): 42.6  Annulus to puncture (mm): 78.7    Left subclavian artery:        Minimum lumen diameter (MLD) (mm): 6.7  Diameter perpendicular to MLD (mm): 7.1    Abdominal Aorta:        Minimum lumen diameter (MLD) (mm): 10.4        Diameter perpendicular to MLD (mm): 11.4    Peripheral Vascular Measurements:    Right Common Iliac:        Minimum Lumen Diameter (mm):6.8        Diameter perpendicular to MLD (mm): 10.7        Tortuosity: Mild        Calcification: Mild    Left Common Iliac:         Minimum Lumen Diameter (mm): 5.7        Diameter perpendicular to MLD (mm): 9.3        Tortuosity: Mild        Calcification: Mild    Right External Iliac:        Minimum Lumen Diameter (mm): 7.1        Diameter perpendicular to MLD (mm): 8.9        Tortuosity: Mild        Calcification: Mild    Left External Iliac:        Minimum Lumen Diameter (mm): 6        Diameter perpendicular to MLD (mm): 8.4        Tortuosity: Mild        Calcification: Mild    Right Femoral:        Minimum Lumen Diameter (mm): 4.8        Diameter perpendicular to MLD (mm): 8.3        Tortuosity: Mild        Calcification: Mild  Left Femoral:        Minimum Lumen Diameter (mm): 5.4        Diameter perpendicular to MLD (mm): 5.6        Tortuosity: Mild        Calcification: Mild    IMPRESSION:    1.  Severely calcified (Agatston calcium score 4167) trileaflet aortic valve.    2.  Aortic and peripheral access vessel measurements as reported above.      < end of copied text >    -CATHETERIZATION: < from: Cardiac Cath Lab - Adult (03.10.21 @ 14:45) >  INTERVENTIONAL IMPRESSIONS: Successful RYAN with a 26MM EVOLUT PRO PLUS  valve via left TF access; there was 1-2+ PVL following post-dilatation;  the decision was made not to balloon upsize and further post-dilate due to  the risk of annular / aortic disruption in the setting of prohibitive    < end of copied text >      < from: Cardiac Cath Lab - Adult (01.29.21 @ 10:56) >  CORONARY VESSELS: The coronary circulation is right dominant.  LM:   --  LM: Normal.  LAD:   --  Mid LAD: There was a 80 % stenosis.  --  D1: There was a 30 % stenosis.  CX:   --  Circumflex: Normal.  RCA:   --  RCA: Normal.  COMPLICATIONS: There were no complications.  INTERVENTIONAL RECOMMENDATIONS: DAPT for 3 mths  Prepared and signed by    < end of copied text >    -OTHER:  ECG:  N/A    TELEMETRY EVENTS: N/A HPI:  Patient is a 78-year-old female past medical history of hypertension hyperlipidemia CAD with stents neuropathy seen as a trauma consult coming in here for fall.  Patient had a mechanical fall onto her left back and femur earlier today.  Unable to ambulate afterwards.  Denies head trauma LOC or AC.  No other complaints. Trauma assessment in ED: ABCs intact , GCS 15 , AAOx3.    Patient seen and examined at bedside. HPI noted above. Patient laying in bed and in no acute distress with left leg inverted and adducted. Patient states she was adjusting a balaji on her bed when she lost balance from standing and fell on the left side of her body, she immediately felt the left lower leg pain and was unable to get up, or ambulate on her own after the fall. Xray of LLE notes left femur fracture. She denies any other injury or sites of pain. Neuro exam grossly intact. Vascular, neurological exam normal distal to injury. A/Ox3 and GCS 15. Denies fevers, chills, night sweats, chest pain. Admits to inability to ambulate.  (03 Jun 2023 15:54)        PAST MEDICAL & SURGICAL HISTORY  Hypertension    HLD (hyperlipidemia)    CAD (coronary artery disease)    OA (osteoarthritis)    Compression fracture of spine  2015 lumbar    Aortic stenosis    Compression fracture of spine    Type 2 diabetes mellitus  diet controlled    OAB (overactive bladder)    Transient ischemic attack (TIA)  Jan 2014- with residual weakness on right leg    Falls    Neuropathy    History of coronary artery stent placement  KILEY to mLAD (Jan 2021)    History of cataract surgery    H/O oral surgery        FAMILY HISTORY:  FAMILY HISTORY:  FH: type 2 diabetes  grandfather    FH: stroke  mother    FH: heart disease  father, grandfather        SOCIAL HISTORY:  Social History:      ALLERGIES:  Vicodin (Vomiting; Nausea)  codeine (Unknown)      MEDICATIONS:  acetaminophen     Tablet .. 650 milliGRAM(s) Oral every 6 hours  amLODIPine   Tablet 5 milliGRAM(s) Oral daily  aspirin enteric coated 81 milliGRAM(s) Oral daily  atorvastatin 20 milliGRAM(s) Oral at bedtime  calcium carbonate 1250 mG  + Vitamin D (OsCal 500 + D) 1 Tablet(s) Oral daily  enoxaparin Injectable 40 milliGRAM(s) SubCutaneous every 24 hours  gabapentin 300 milliGRAM(s) Oral three times a day  lisinopril 10 milliGRAM(s) Oral daily  metoprolol tartrate 25 milliGRAM(s) Oral daily  multivitamin 1 Tablet(s) Oral daily  oxybutynin 5 milliGRAM(s) Oral three times a day  senna 2 Tablet(s) Oral at bedtime  sodium chloride 0.9%. 1000 milliLiter(s) (100 mL/Hr) IV Continuous <Continuous>    PRN:  ALPRAZolam 0.25 milliGRAM(s) Oral daily PRN  HYDROmorphone  Injectable 0.25 milliGRAM(s) IV Push every 4 hours PRN  ketorolac   Injectable 15 milliGRAM(s) IV Push every 6 hours PRN  ondansetron Injectable 4 milliGRAM(s) IV Push every 6 hours PRN      HOME MEDICATIONS:  Home Medications:  acetaminophen 325 mg oral capsule: 1 cap(s) orally every 8 hours, As Needed (10 Mar 2021 12:29)  ALPRAZolam 0.25 mg oral tablet: 1 orally as needed for  anxiety (03 Jun 2023 17:09)  amLODIPine 5 mg oral tablet: 1 tab(s) orally once a day (10 Mar 2021 12:29)  aspirin 81 mg oral delayed release tablet: 1 tab(s) orally once a day (10 Mar 2021 12:29)  Caltrate 600 + D oral tablet: 1 tab(s) orally once a day (10 Mar 2021 12:29)  chlorthalidone 25 mg oral tablet: 1 orally once a day (03 Jun 2023 17:09)  Fish Oil 1000 mg oral capsule: 1 cap(s) orally once a day. Instructed to stop on 3/8/21. (10 Mar 2021 12:29)  gabapentin 300 mg oral capsule: 1 cap(s) orally 3 times a day (10 Mar 2021 12:29)  lisinopril 10 mg oral tablet: 1 orally once a day (03 Jun 2023 17:04)  metoprolol tartrate 25 mg oral tablet: 1 orally once a day (03 Jun 2023 17:09)  Multiple Vitamins oral tablet: 1 tab(s) orally once a day. Instructed to stop on 3/8/21. (10 Mar 2021 12:29)  Osteo Bi-Flex 250 mg-200 mg oral tablet: orally once a day. Instructed to stop on 3/8/21.  (10 Mar 2021 12:29)  oxyBUTYnin 5 mg oral tablet: 1 orally 3 times a day Three times per day Every day at 8:00Am, 12PM, 5PM (03 Jun 2023 17:09)  simvastatin 40 mg oral tablet: 1 orally once a day (03 Jun 2023 17:09)      VITALS:   T(F): 98.7 (06-03 @ 15:43), Max: 98.7 (06-03 @ 15:43)  HR: 82 (06-03 @ 15:43) (69 - 82)  BP: 144/65 (06-03 @ 15:43) (144/65 - 180/78)  BP(mean): --  RR: 18 (06-03 @ 15:43) (18 - 18)  SpO2: 98% (06-03 @ 15:43) (98% - 98%)    I&O's Summary      REVIEW OF SYSTEMS:  CONSTITUTIONAL: No weakness, fevers or chills  HEENT: No visual changes, neck/ear pain  RESPIRATORY: No cough, sob  CARDIOVASCULAR: See HPI  GASTROINTESTINAL: No abdominal pain. No nausea, vomiting, diarrhea   GENITOURINARY: No dysuria, frequency or hematuria  NEUROLOGICAL: No new focal deficits  SKIN: No new rashes    PHYSICAL EXAM:  General: Not in distress.     HEENT: EOMI  Cardio: regular, S1, S2   Pulm: B/L BS.  No wheezing / crackles / rales  Abdomen: Soft, non-tender, non-distended. Normoactive bowel sounds  Extremities: No edema b/l le  Neuro: A&O x3. No focal deficits    LABS:                        10.8   10.23 )-----------( 216      ( 03 Jun 2023 14:33 )             32.6     06-03    135  |  102  |  38<H>  ----------------------------<  136<H>  5.4<H>   |  21  |  1.0    Ca    9.2      03 Jun 2023 12:27    TPro  6.4  /  Alb  4.0  /  TBili  0.4  /  DBili  x   /  AST  30  /  ALT  12  /  AlkPhos  66  06-03              Troponin trend:            RADIOLOGY:  < from: CT Chest w/ IV Cont (06.03.23 @ 16:04) >      IMPRESSION:    Acute, displaced left proximal femur fracture, partially visualized. See   dedicated pelvis/hip radiographs.    Subacute fracture of right posterior rib #10.    --- End of Report ---    < end of copied text >    -TTE: < from: Transthoracic Echocardiogram (03.11.21 @ 19:11) >  Conclusions:  1. Mitral annular calcification and calcified mitral  leaflets with decreased diastolic opening. Mild mitral  regurgitation.  Peak mitral valve gradient equals 16 mm Hg,  mean transmitral valve gradient equals 5 mm Hg, consistent  with moderate mitral stenosis. ()  2. Transcatheter aortic valve replacement. Peak transaortic  valve gradient equals 23 mm Hg, mean transaortic valve  gradient equals 12 mm Hg, which is probably normal in the  presence of a transcatheter aortic valve replacement.  Minimal paravalvular aortic regurgitation.  3. Moderate concentric left ventricular hypertrophy.  4. Hyperdynamic left ventricular systolic function. Peak  left ventricular outflow tract gradient equals 100 mm Hg,  consistent with severe LVOT obstruction.  5. Reversal of the E-A waves of the mitral inflow pattern  consistent with reduced compliance of the left ventricle.  6. Normal right ventricular size and function.  7. Estimated pulmonary artery systolic pressure equals 42  mm Hg, assuming right atrial pressure equals 8 mm Hg,  consistent with mild pulmonary pressures.  8. Normal pericardium with trace pericardial effusion.  *** Compared with echocardiogram of 3/10/2021, s/p TAVR  with improved gradients across the AV but has LVOT  obstruction in the setting of concentric left ventricular  hypertrophy.    < end of copied text >    -CCTA:< from: CT Heart without Coronaries w/ IV Cont (01.21.21 @ 11:31) >  INTERPRETATION:  Indication: Severe aortic stenosis referred for TAVR evaluation.    Procedure:  Informed consent was obtained from the patient and there were no complications during the procedure. ECG-gated 320-multidetector computed tomography of the heart was performed with a contrast injection of 70 mL of Omnipaque 350. CT full chest/abdomen/pelvis was also performed. The patient was not pretreated with any medications. Two- and three-dimensional images were reconstructed using a [employer] Workstation. The image quality is good.    FINDINGS:    NON-CARDIAC:  Scoliosis. Severe degenerative changes of bilateral hip joints, left greater than the right. The chest, abdomen and pelvis are otherwise unremarkable.    CARDIOVASCULAR:  Left atrium is mildly dilated. Left ventricular hypertrophy.  This study was not optimized for coronary artery evaluation. Coronary artery calcifications are noted in the coronary arteries.  Please refer to invasive coronary angiography performed pre-TAVR for coronary artery assessment. There is no pericardial effusion. There is no left atrial appendage thrombus. No thoracic aortic dissection or aneurysm is noted.    Aortic valve: Severe calcification (Agatston calcium score 4167)  Aortic root: Moderate calcification  Ascending aorta: No calcification  Aortic arch: Mild calcification  Descending thoracic aorta: Mild calcification  Abdominal aorta: Mild calcification      AORTIC ROOT MEASUREMENTS    Major aortic annulus diameter (systole, mm): 22.4  Perpendicular minor aortic annulus diameter (systole, mm): 21.3  Aortic annulus perimeter (systole, mm): 72  Aortic annulus area (systole, mm2): 401    LVOT minimum diameter (systole, mm): 19.5  LVOT 90 cross (systole, mm): 24.9  LVOT calcification: Moderate    Distance from Aortic annulus to Left Main coronary artery (diastole, mm): 16.5  Distance from Aortic annulus to Right Coronary artery (diastole, mm): 9.5    Sinus of Valsalva diameter, Right coronary (diastole, mm): 28.2  Sinus of Valsalva diameter, Left coronary (diastole, mm): 29.7  Sinus of Valsalva diameter, Non coronary (diastole, mm): 29    Diameter at the sinotubular junction (diastole, mm): 26.5 x 28  Maximum ascending aorta diameter at 40 mm above annulus (diastole, mm): 31.6 x 31.1  Aortic root angulation (diastole, degrees): 42.6  Annulus to puncture (mm): 78.7    Left subclavian artery:        Minimum lumen diameter (MLD) (mm): 6.7  Diameter perpendicular to MLD (mm): 7.1    Abdominal Aorta:        Minimum lumen diameter (MLD) (mm): 10.4        Diameter perpendicular to MLD (mm): 11.4    Peripheral Vascular Measurements:    Right Common Iliac:        Minimum Lumen Diameter (mm):6.8        Diameter perpendicular to MLD (mm): 10.7        Tortuosity: Mild        Calcification: Mild    Left Common Iliac:         Minimum Lumen Diameter (mm): 5.7        Diameter perpendicular to MLD (mm): 9.3        Tortuosity: Mild        Calcification: Mild    Right External Iliac:        Minimum Lumen Diameter (mm): 7.1        Diameter perpendicular to MLD (mm): 8.9        Tortuosity: Mild        Calcification: Mild    Left External Iliac:        Minimum Lumen Diameter (mm): 6        Diameter perpendicular to MLD (mm): 8.4        Tortuosity: Mild        Calcification: Mild    Right Femoral:        Minimum Lumen Diameter (mm): 4.8        Diameter perpendicular to MLD (mm): 8.3        Tortuosity: Mild        Calcification: Mild  Left Femoral:        Minimum Lumen Diameter (mm): 5.4        Diameter perpendicular to MLD (mm): 5.6        Tortuosity: Mild        Calcification: Mild    IMPRESSION:    1.  Severely calcified (Agatston calcium score 4167) trileaflet aortic valve.    2.  Aortic and peripheral access vessel measurements as reported above.      < end of copied text >    -CATHETERIZATION: < from: Cardiac Cath Lab - Adult (03.10.21 @ 14:45) >  INTERVENTIONAL IMPRESSIONS: Successful RYAN with a 26MM EVOLUT PRO PLUS  valve via left TF access; there was 1-2+ PVL following post-dilatation;  the decision was made not to balloon upsize and further post-dilate due to  the risk of annular / aortic disruption in the setting of prohibitive    < end of copied text >      < from: Cardiac Cath Lab - Adult (01.29.21 @ 10:56) >  CORONARY VESSELS: The coronary circulation is right dominant.  LM:   --  LM: Normal.  LAD:   --  Mid LAD: There was a 80 % stenosis.  --  D1: There was a 30 % stenosis.  CX:   --  Circumflex: Normal.  RCA:   --  RCA: Normal.  COMPLICATIONS: There were no complications.  INTERVENTIONAL RECOMMENDATIONS: DAPT for 3 mths  Prepared and signed by    < end of copied text >    -OTHER:  ECG:  N/A    TELEMETRY EVENTS: N/A HPI:  Patient is a 78-year-old female past medical history of hypertension hyperlipidemia CAD with stents neuropathy seen as a trauma consult coming in here for fall.  Patient had a mechanical fall onto her left back and femur earlier today.  Unable to ambulate afterwards.  Denies head trauma LOC or AC.  No other complaints. Trauma assessment in ED: ABCs intact , GCS 15 , AAOx3.    Patient seen and examined at bedside. HPI noted above. Patient laying in bed and in no acute distress with left leg inverted and adducted. Patient states she was adjusting a balaji on her bed when she lost balance from standing and fell on the left side of her body, she immediately felt the left lower leg pain and was unable to get up, or ambulate on her own after the fall. Xray of LLE notes left femur fracture. She denies any other injury or sites of pain. Neuro exam grossly intact. Vascular, neurological exam normal distal to injury. A/Ox3 and GCS 15. Denies fevers, chills, night sweats, chest pain. Admits to inability to ambulate.  (03 Jun 2023 15:54)        PAST MEDICAL & SURGICAL HISTORY  Hypertension    HLD (hyperlipidemia)    CAD (coronary artery disease)    OA (osteoarthritis)    Compression fracture of spine  2015 lumbar    Aortic stenosis    Compression fracture of spine    Type 2 diabetes mellitus  diet controlled    OAB (overactive bladder)    Transient ischemic attack (TIA)  Jan 2014- with residual weakness on right leg    Falls    Neuropathy    History of coronary artery stent placement  KILEY to mLAD (Jan 2021)    History of cataract surgery    H/O oral surgery        FAMILY HISTORY:  FAMILY HISTORY:  FH: type 2 diabetes  grandfather    FH: stroke  mother    FH: heart disease  father, grandfather        SOCIAL HISTORY:  Social History:      ALLERGIES:  Vicodin (Vomiting; Nausea)  codeine (Unknown)      MEDICATIONS:  acetaminophen     Tablet .. 650 milliGRAM(s) Oral every 6 hours  amLODIPine   Tablet 5 milliGRAM(s) Oral daily  aspirin enteric coated 81 milliGRAM(s) Oral daily  atorvastatin 20 milliGRAM(s) Oral at bedtime  calcium carbonate 1250 mG  + Vitamin D (OsCal 500 + D) 1 Tablet(s) Oral daily  enoxaparin Injectable 40 milliGRAM(s) SubCutaneous every 24 hours  gabapentin 300 milliGRAM(s) Oral three times a day  lisinopril 10 milliGRAM(s) Oral daily  metoprolol tartrate 25 milliGRAM(s) Oral daily  multivitamin 1 Tablet(s) Oral daily  oxybutynin 5 milliGRAM(s) Oral three times a day  senna 2 Tablet(s) Oral at bedtime  sodium chloride 0.9%. 1000 milliLiter(s) (100 mL/Hr) IV Continuous <Continuous>    PRN:  ALPRAZolam 0.25 milliGRAM(s) Oral daily PRN  HYDROmorphone  Injectable 0.25 milliGRAM(s) IV Push every 4 hours PRN  ketorolac   Injectable 15 milliGRAM(s) IV Push every 6 hours PRN  ondansetron Injectable 4 milliGRAM(s) IV Push every 6 hours PRN      HOME MEDICATIONS:  Home Medications:  acetaminophen 325 mg oral capsule: 1 cap(s) orally every 8 hours, As Needed (10 Mar 2021 12:29)  ALPRAZolam 0.25 mg oral tablet: 1 orally as needed for  anxiety (03 Jun 2023 17:09)  amLODIPine 5 mg oral tablet: 1 tab(s) orally once a day (10 Mar 2021 12:29)  aspirin 81 mg oral delayed release tablet: 1 tab(s) orally once a day (10 Mar 2021 12:29)  Caltrate 600 + D oral tablet: 1 tab(s) orally once a day (10 Mar 2021 12:29)  chlorthalidone 25 mg oral tablet: 1 orally once a day (03 Jun 2023 17:09)  Fish Oil 1000 mg oral capsule: 1 cap(s) orally once a day. Instructed to stop on 3/8/21. (10 Mar 2021 12:29)  gabapentin 300 mg oral capsule: 1 cap(s) orally 3 times a day (10 Mar 2021 12:29)  lisinopril 10 mg oral tablet: 1 orally once a day (03 Jun 2023 17:04)  metoprolol tartrate 25 mg oral tablet: 1 orally once a day (03 Jun 2023 17:09)  Multiple Vitamins oral tablet: 1 tab(s) orally once a day. Instructed to stop on 3/8/21. (10 Mar 2021 12:29)  Osteo Bi-Flex 250 mg-200 mg oral tablet: orally once a day. Instructed to stop on 3/8/21.  (10 Mar 2021 12:29)  oxyBUTYnin 5 mg oral tablet: 1 orally 3 times a day Three times per day Every day at 8:00Am, 12PM, 5PM (03 Jun 2023 17:09)  simvastatin 40 mg oral tablet: 1 orally once a day (03 Jun 2023 17:09)      VITALS:   T(F): 98.7 (06-03 @ 15:43), Max: 98.7 (06-03 @ 15:43)  HR: 82 (06-03 @ 15:43) (69 - 82)  BP: 144/65 (06-03 @ 15:43) (144/65 - 180/78)  BP(mean): --  RR: 18 (06-03 @ 15:43) (18 - 18)  SpO2: 98% (06-03 @ 15:43) (98% - 98%)    I&O's Summary      REVIEW OF SYSTEMS:  CONSTITUTIONAL: No weakness, fevers or chills  HEENT: No visual changes, neck/ear pain  RESPIRATORY: No cough, sob  CARDIOVASCULAR: See HPI  GASTROINTESTINAL: No abdominal pain. No nausea, vomiting, diarrhea   GENITOURINARY: No dysuria, frequency or hematuria  NEUROLOGICAL: No new focal deficits  SKIN: No new rashes    PHYSICAL EXAM:  General: Not in distress.     HEENT: EOMI  Cardio: regular, S1, S2. ABDELRAHMAN 2/6 in aortic area  Pulm: B/L BS.  No wheezing / crackles / rales  Abdomen: Soft, non-tender, non-distended. Normoactive bowel sounds  Extremities: No edema b/l le  Neuro: A&O x3. No focal deficits    LABS:                        10.8   10.23 )-----------( 216      ( 03 Jun 2023 14:33 )             32.6     06-03    135  |  102  |  38<H>  ----------------------------<  136<H>  5.4<H>   |  21  |  1.0    Ca    9.2      03 Jun 2023 12:27    TPro  6.4  /  Alb  4.0  /  TBili  0.4  /  DBili  x   /  AST  30  /  ALT  12  /  AlkPhos  66  06-03              Troponin trend:            RADIOLOGY:  < from: CT Chest w/ IV Cont (06.03.23 @ 16:04) >      IMPRESSION:    Acute, displaced left proximal femur fracture, partially visualized. See   dedicated pelvis/hip radiographs.    Subacute fracture of right posterior rib #10.    --- End of Report ---    < end of copied text >    -TTE: < from: Transthoracic Echocardiogram (03.11.21 @ 19:11) >  Conclusions:  1. Mitral annular calcification and calcified mitral  leaflets with decreased diastolic opening. Mild mitral  regurgitation.  Peak mitral valve gradient equals 16 mm Hg,  mean transmitral valve gradient equals 5 mm Hg, consistent  with moderate mitral stenosis. ()  2. Transcatheter aortic valve replacement. Peak transaortic  valve gradient equals 23 mm Hg, mean transaortic valve  gradient equals 12 mm Hg, which is probably normal in the  presence of a transcatheter aortic valve replacement.  Minimal paravalvular aortic regurgitation.  3. Moderate concentric left ventricular hypertrophy.  4. Hyperdynamic left ventricular systolic function. Peak  left ventricular outflow tract gradient equals 100 mm Hg,  consistent with severe LVOT obstruction.  5. Reversal of the E-A waves of the mitral inflow pattern  consistent with reduced compliance of the left ventricle.  6. Normal right ventricular size and function.  7. Estimated pulmonary artery systolic pressure equals 42  mm Hg, assuming right atrial pressure equals 8 mm Hg,  consistent with mild pulmonary pressures.  8. Normal pericardium with trace pericardial effusion.  *** Compared with echocardiogram of 3/10/2021, s/p TAVR  with improved gradients across the AV but has LVOT  obstruction in the setting of concentric left ventricular  hypertrophy.    < end of copied text >    -CCTA:< from: CT Heart without Coronaries w/ IV Cont (01.21.21 @ 11:31) >  INTERPRETATION:  Indication: Severe aortic stenosis referred for TAVR evaluation.    Procedure:  Informed consent was obtained from the patient and there were no complications during the procedure. ECG-gated 320-multidetector computed tomography of the heart was performed with a contrast injection of 70 mL of Omnipaque 350. CT full chest/abdomen/pelvis was also performed. The patient was not pretreated with any medications. Two- and three-dimensional images were reconstructed using a [employer] Workstation. The image quality is good.    FINDINGS:    NON-CARDIAC:  Scoliosis. Severe degenerative changes of bilateral hip joints, left greater than the right. The chest, abdomen and pelvis are otherwise unremarkable.    CARDIOVASCULAR:  Left atrium is mildly dilated. Left ventricular hypertrophy.  This study was not optimized for coronary artery evaluation. Coronary artery calcifications are noted in the coronary arteries.  Please refer to invasive coronary angiography performed pre-TAVR for coronary artery assessment. There is no pericardial effusion. There is no left atrial appendage thrombus. No thoracic aortic dissection or aneurysm is noted.    Aortic valve: Severe calcification (Agatston calcium score 4167)  Aortic root: Moderate calcification  Ascending aorta: No calcification  Aortic arch: Mild calcification  Descending thoracic aorta: Mild calcification  Abdominal aorta: Mild calcification      AORTIC ROOT MEASUREMENTS    Major aortic annulus diameter (systole, mm): 22.4  Perpendicular minor aortic annulus diameter (systole, mm): 21.3  Aortic annulus perimeter (systole, mm): 72  Aortic annulus area (systole, mm2): 401    LVOT minimum diameter (systole, mm): 19.5  LVOT 90 cross (systole, mm): 24.9  LVOT calcification: Moderate    Distance from Aortic annulus to Left Main coronary artery (diastole, mm): 16.5  Distance from Aortic annulus to Right Coronary artery (diastole, mm): 9.5    Sinus of Valsalva diameter, Right coronary (diastole, mm): 28.2  Sinus of Valsalva diameter, Left coronary (diastole, mm): 29.7  Sinus of Valsalva diameter, Non coronary (diastole, mm): 29    Diameter at the sinotubular junction (diastole, mm): 26.5 x 28  Maximum ascending aorta diameter at 40 mm above annulus (diastole, mm): 31.6 x 31.1  Aortic root angulation (diastole, degrees): 42.6  Annulus to puncture (mm): 78.7    Left subclavian artery:        Minimum lumen diameter (MLD) (mm): 6.7  Diameter perpendicular to MLD (mm): 7.1    Abdominal Aorta:        Minimum lumen diameter (MLD) (mm): 10.4        Diameter perpendicular to MLD (mm): 11.4    Peripheral Vascular Measurements:    Right Common Iliac:        Minimum Lumen Diameter (mm):6.8        Diameter perpendicular to MLD (mm): 10.7        Tortuosity: Mild        Calcification: Mild    Left Common Iliac:         Minimum Lumen Diameter (mm): 5.7        Diameter perpendicular to MLD (mm): 9.3        Tortuosity: Mild        Calcification: Mild    Right External Iliac:        Minimum Lumen Diameter (mm): 7.1        Diameter perpendicular to MLD (mm): 8.9        Tortuosity: Mild        Calcification: Mild    Left External Iliac:        Minimum Lumen Diameter (mm): 6        Diameter perpendicular to MLD (mm): 8.4        Tortuosity: Mild        Calcification: Mild    Right Femoral:        Minimum Lumen Diameter (mm): 4.8        Diameter perpendicular to MLD (mm): 8.3        Tortuosity: Mild        Calcification: Mild  Left Femoral:        Minimum Lumen Diameter (mm): 5.4        Diameter perpendicular to MLD (mm): 5.6        Tortuosity: Mild        Calcification: Mild    IMPRESSION:    1.  Severely calcified (Agatston calcium score 4167) trileaflet aortic valve.    2.  Aortic and peripheral access vessel measurements as reported above.      < end of copied text >    -CATHETERIZATION: < from: Cardiac Cath Lab - Adult (03.10.21 @ 14:45) >  INTERVENTIONAL IMPRESSIONS: Successful RYAN with a 26MM EVOLUT PRO PLUS  valve via left TF access; there was 1-2+ PVL following post-dilatation;  the decision was made not to balloon upsize and further post-dilate due to  the risk of annular / aortic disruption in the setting of prohibitive    < end of copied text >      < from: Cardiac Cath Lab - Adult (01.29.21 @ 10:56) >  CORONARY VESSELS: The coronary circulation is right dominant.  LM:   --  LM: Normal.  LAD:   --  Mid LAD: There was a 80 % stenosis.  --  D1: There was a 30 % stenosis.  CX:   --  Circumflex: Normal.  RCA:   --  RCA: Normal.  COMPLICATIONS: There were no complications.  INTERVENTIONAL RECOMMENDATIONS: DAPT for 3 mths  Prepared and signed by    < end of copied text >    -OTHER:  ECG:  N/A    TELEMETRY EVENTS: N/A HPI:  Patient is a 78-year-old female past medical history of hypertension hyperlipidemia CAD with stents neuropathy seen as a trauma consult coming in here for fall.  Patient had a mechanical fall onto her left back and femur earlier today.  Unable to ambulate afterwards.  Denies head trauma LOC or AC.  No other complaints. Trauma assessment in ED: ABCs intact , GCS 15 , AAOx3.    Patient seen and examined at bedside. HPI noted above. Patient laying in bed and in no acute distress with left leg inverted and adducted. Patient states she was adjusting a balaji on her bed when she lost balance from standing and fell on the left side of her body, she immediately felt the left lower leg pain and was unable to get up, or ambulate on her own after the fall. Xray of LLE notes left femur fracture. She denies any other injury or sites of pain. Neuro exam grossly intact. Vascular, neurological exam normal distal to injury. A/Ox3 and GCS 15. Denies fevers, chills, night sweats, chest pain. Admits to inability to ambulate.  (03 Jun 2023 15:54)        PAST MEDICAL & SURGICAL HISTORY  Hypertension    HLD (hyperlipidemia)    CAD (coronary artery disease)    OA (osteoarthritis)    Compression fracture of spine  2015 lumbar    Aortic stenosis    Compression fracture of spine    Type 2 diabetes mellitus  diet controlled    OAB (overactive bladder)    Transient ischemic attack (TIA)  Jan 2014- with residual weakness on right leg    Falls    Neuropathy    History of coronary artery stent placement  KILEY to mLAD (Jan 2021)    History of cataract surgery    H/O oral surgery        FAMILY HISTORY:  FAMILY HISTORY:  FH: type 2 diabetes  grandfather    FH: stroke  mother    FH: heart disease  father, grandfather        SOCIAL HISTORY:  Social History: no toxic habits      ALLERGIES:  Vicodin (Vomiting; Nausea)  codeine (Unknown)      MEDICATIONS:  acetaminophen     Tablet .. 650 milliGRAM(s) Oral every 6 hours  amLODIPine   Tablet 5 milliGRAM(s) Oral daily  aspirin enteric coated 81 milliGRAM(s) Oral daily  atorvastatin 20 milliGRAM(s) Oral at bedtime  calcium carbonate 1250 mG  + Vitamin D (OsCal 500 + D) 1 Tablet(s) Oral daily  enoxaparin Injectable 40 milliGRAM(s) SubCutaneous every 24 hours  gabapentin 300 milliGRAM(s) Oral three times a day  lisinopril 10 milliGRAM(s) Oral daily  metoprolol tartrate 25 milliGRAM(s) Oral daily  multivitamin 1 Tablet(s) Oral daily  oxybutynin 5 milliGRAM(s) Oral three times a day  senna 2 Tablet(s) Oral at bedtime  sodium chloride 0.9%. 1000 milliLiter(s) (100 mL/Hr) IV Continuous <Continuous>    PRN:  ALPRAZolam 0.25 milliGRAM(s) Oral daily PRN  HYDROmorphone  Injectable 0.25 milliGRAM(s) IV Push every 4 hours PRN  ketorolac   Injectable 15 milliGRAM(s) IV Push every 6 hours PRN  ondansetron Injectable 4 milliGRAM(s) IV Push every 6 hours PRN      HOME MEDICATIONS:  Home Medications:  acetaminophen 325 mg oral capsule: 1 cap(s) orally every 8 hours, As Needed (10 Mar 2021 12:29)  ALPRAZolam 0.25 mg oral tablet: 1 orally as needed for  anxiety (03 Jun 2023 17:09)  amLODIPine 5 mg oral tablet: 1 tab(s) orally once a day (10 Mar 2021 12:29)  aspirin 81 mg oral delayed release tablet: 1 tab(s) orally once a day (10 Mar 2021 12:29)  Caltrate 600 + D oral tablet: 1 tab(s) orally once a day (10 Mar 2021 12:29)  chlorthalidone 25 mg oral tablet: 1 orally once a day (03 Jun 2023 17:09)  Fish Oil 1000 mg oral capsule: 1 cap(s) orally once a day. Instructed to stop on 3/8/21. (10 Mar 2021 12:29)  gabapentin 300 mg oral capsule: 1 cap(s) orally 3 times a day (10 Mar 2021 12:29)  lisinopril 10 mg oral tablet: 1 orally once a day (03 Jun 2023 17:04)  metoprolol tartrate 25 mg oral tablet: 1 orally once a day (03 Jun 2023 17:09)  Multiple Vitamins oral tablet: 1 tab(s) orally once a day. Instructed to stop on 3/8/21. (10 Mar 2021 12:29)  Osteo Bi-Flex 250 mg-200 mg oral tablet: orally once a day. Instructed to stop on 3/8/21.  (10 Mar 2021 12:29)  oxyBUTYnin 5 mg oral tablet: 1 orally 3 times a day Three times per day Every day at 8:00Am, 12PM, 5PM (03 Jun 2023 17:09)  simvastatin 40 mg oral tablet: 1 orally once a day (03 Jun 2023 17:09)      VITALS:   T(F): 98.7 (06-03 @ 15:43), Max: 98.7 (06-03 @ 15:43)  HR: 82 (06-03 @ 15:43) (69 - 82)  BP: 144/65 (06-03 @ 15:43) (144/65 - 180/78)  BP(mean): --  RR: 18 (06-03 @ 15:43) (18 - 18)  SpO2: 98% (06-03 @ 15:43) (98% - 98%)    I&O's Summary      REVIEW OF SYSTEMS:  CONSTITUTIONAL: No weakness, fevers or chills  HEENT: No visual changes, neck/ear pain  RESPIRATORY: No cough, sob  CARDIOVASCULAR: See HPI  GASTROINTESTINAL: No abdominal pain. No nausea, vomiting, diarrhea   GENITOURINARY: No dysuria, frequency or hematuria  NEUROLOGICAL: No new focal deficits  SKIN: No new rashes  10 point ROS completed; negative except as stated in HPI    PHYSICAL EXAM:  General: Not in distress.     HEENT: EOMI, PERRLA  Neck: supple, no JVD  Cardio: regular, S1, S2. ABDELRAHMAN 2/6 in aortic area  Pulm: B/L BS.  No wheezing / crackles / rales  Abdomen: Soft, non-tender, non-distended. Normoactive bowel sounds  Extremities: No edema b/l le  Neuro: A&O x3. No focal deficits    LABS:                        10.8   10.23 )-----------( 216      ( 03 Jun 2023 14:33 )             32.6     06-03    135  |  102  |  38<H>  ----------------------------<  136<H>  5.4<H>   |  21  |  1.0    Ca    9.2      03 Jun 2023 12:27    TPro  6.4  /  Alb  4.0  /  TBili  0.4  /  DBili  x   /  AST  30  /  ALT  12  /  AlkPhos  66  06-03              Troponin trend:            RADIOLOGY:  < from: CT Chest w/ IV Cont (06.03.23 @ 16:04) >      IMPRESSION:    Acute, displaced left proximal femur fracture, partially visualized. See   dedicated pelvis/hip radiographs.    Subacute fracture of right posterior rib #10.    --- End of Report ---    < end of copied text >    -TTE: < from: Transthoracic Echocardiogram (03.11.21 @ 19:11) >  Conclusions:  1. Mitral annular calcification and calcified mitral  leaflets with decreased diastolic opening. Mild mitral  regurgitation.  Peak mitral valve gradient equals 16 mm Hg,  mean transmitral valve gradient equals 5 mm Hg, consistent  with moderate mitral stenosis. ()  2. Transcatheter aortic valve replacement. Peak transaortic  valve gradient equals 23 mm Hg, mean transaortic valve  gradient equals 12 mm Hg, which is probably normal in the  presence of a transcatheter aortic valve replacement.  Minimal paravalvular aortic regurgitation.  3. Moderate concentric left ventricular hypertrophy.  4. Hyperdynamic left ventricular systolic function. Peak  left ventricular outflow tract gradient equals 100 mm Hg,  consistent with severe LVOT obstruction.  5. Reversal of the E-A waves of the mitral inflow pattern  consistent with reduced compliance of the left ventricle.  6. Normal right ventricular size and function.  7. Estimated pulmonary artery systolic pressure equals 42  mm Hg, assuming right atrial pressure equals 8 mm Hg,  consistent with mild pulmonary pressures.  8. Normal pericardium with trace pericardial effusion.  *** Compared with echocardiogram of 3/10/2021, s/p TAVR  with improved gradients across the AV but has LVOT  obstruction in the setting of concentric left ventricular  hypertrophy.    < end of copied text >    -CCTA:< from: CT Heart without Coronaries w/ IV Cont (01.21.21 @ 11:31) >  INTERPRETATION:  Indication: Severe aortic stenosis referred for TAVR evaluation.    Procedure:  Informed consent was obtained from the patient and there were no complications during the procedure. ECG-gated 320-multidetector computed tomography of the heart was performed with a contrast injection of 70 mL of Omnipaque 350. CT full chest/abdomen/pelvis was also performed. The patient was not pretreated with any medications. Two- and three-dimensional images were reconstructed using a [employer] Workstation. The image quality is good.    FINDINGS:    NON-CARDIAC:  Scoliosis. Severe degenerative changes of bilateral hip joints, left greater than the right. The chest, abdomen and pelvis are otherwise unremarkable.    CARDIOVASCULAR:  Left atrium is mildly dilated. Left ventricular hypertrophy.  This study was not optimized for coronary artery evaluation. Coronary artery calcifications are noted in the coronary arteries.  Please refer to invasive coronary angiography performed pre-TAVR for coronary artery assessment. There is no pericardial effusion. There is no left atrial appendage thrombus. No thoracic aortic dissection or aneurysm is noted.    Aortic valve: Severe calcification (Agatston calcium score 4167)  Aortic root: Moderate calcification  Ascending aorta: No calcification  Aortic arch: Mild calcification  Descending thoracic aorta: Mild calcification  Abdominal aorta: Mild calcification      AORTIC ROOT MEASUREMENTS    Major aortic annulus diameter (systole, mm): 22.4  Perpendicular minor aortic annulus diameter (systole, mm): 21.3  Aortic annulus perimeter (systole, mm): 72  Aortic annulus area (systole, mm2): 401    LVOT minimum diameter (systole, mm): 19.5  LVOT 90 cross (systole, mm): 24.9  LVOT calcification: Moderate    Distance from Aortic annulus to Left Main coronary artery (diastole, mm): 16.5  Distance from Aortic annulus to Right Coronary artery (diastole, mm): 9.5    Sinus of Valsalva diameter, Right coronary (diastole, mm): 28.2  Sinus of Valsalva diameter, Left coronary (diastole, mm): 29.7  Sinus of Valsalva diameter, Non coronary (diastole, mm): 29    Diameter at the sinotubular junction (diastole, mm): 26.5 x 28  Maximum ascending aorta diameter at 40 mm above annulus (diastole, mm): 31.6 x 31.1  Aortic root angulation (diastole, degrees): 42.6  Annulus to puncture (mm): 78.7    Left subclavian artery:        Minimum lumen diameter (MLD) (mm): 6.7  Diameter perpendicular to MLD (mm): 7.1    Abdominal Aorta:        Minimum lumen diameter (MLD) (mm): 10.4        Diameter perpendicular to MLD (mm): 11.4    Peripheral Vascular Measurements:    Right Common Iliac:        Minimum Lumen Diameter (mm):6.8        Diameter perpendicular to MLD (mm): 10.7        Tortuosity: Mild        Calcification: Mild    Left Common Iliac:         Minimum Lumen Diameter (mm): 5.7        Diameter perpendicular to MLD (mm): 9.3        Tortuosity: Mild        Calcification: Mild    Right External Iliac:        Minimum Lumen Diameter (mm): 7.1        Diameter perpendicular to MLD (mm): 8.9        Tortuosity: Mild        Calcification: Mild    Left External Iliac:        Minimum Lumen Diameter (mm): 6        Diameter perpendicular to MLD (mm): 8.4        Tortuosity: Mild        Calcification: Mild    Right Femoral:        Minimum Lumen Diameter (mm): 4.8        Diameter perpendicular to MLD (mm): 8.3        Tortuosity: Mild        Calcification: Mild  Left Femoral:        Minimum Lumen Diameter (mm): 5.4        Diameter perpendicular to MLD (mm): 5.6        Tortuosity: Mild        Calcification: Mild    IMPRESSION:    1.  Severely calcified (Agatston calcium score 4167) trileaflet aortic valve.    2.  Aortic and peripheral access vessel measurements as reported above.      < end of copied text >    -CATHETERIZATION: < from: Cardiac Cath Lab - Adult (03.10.21 @ 14:45) >  INTERVENTIONAL IMPRESSIONS: Successful RYAN with a 26MM EVOLUT PRO PLUS  valve via left TF access; there was 1-2+ PVL following post-dilatation;  the decision was made not to balloon upsize and further post-dilate due to  the risk of annular / aortic disruption in the setting of prohibitive    < end of copied text >      < from: Cardiac Cath Lab - Adult (01.29.21 @ 10:56) >  CORONARY VESSELS: The coronary circulation is right dominant.  LM:   --  LM: Normal.  LAD:   --  Mid LAD: There was a 80 % stenosis.  --  D1: There was a 30 % stenosis.  CX:   --  Circumflex: Normal.  RCA:   --  RCA: Normal.  COMPLICATIONS: There were no complications.  INTERVENTIONAL RECOMMENDATIONS: DAPT for 3 mths  Prepared and signed by    < end of copied text >    -OTHER:  ECG:  N/A    TELEMETRY EVENTS: N/A

## 2023-06-03 NOTE — ED ADULT NURSE NOTE - NSFALLHARMRISKINTERV_ED_ALL_ED

## 2023-06-03 NOTE — ED PROVIDER NOTE - CADM POA CENTRAL LINE
No chief complaint on file.        pREOPERATIVE history and physical     REQUESTING Physician    Dr. Osman Orellana MD    PRIMARY CARE Physician    Betty Castillo MD    HISTORY OF PRESENT ILLNESS    This patient was seen at the request of Dr. Osman Orellana MD for preoperative clearance of laparoscopic cholecystectomy to be performed on 8/20/18.  She is currently not taking anything for pain.  No fevers.        MEDICAL HISTORY    Past Medical History:   Diagnosis Date   • PIH (pregnancy induced hypertension)    • Scarlet fever 2004   • UTI (urinary tract infection)    • Varicella    • Wears glasses        FAMILY HISTORY    Family History   Problem Relation Age of Onset   • Other Mother          benign ovarian cyst   • Hypotension Mother    • High blood pressure Father    • High cholesterol Father    • Cancer Maternal Aunt 28        breast cancer   • Diabetes Paternal Aunt    • Cancer Maternal Grandfather         pancreatic cancer   • Cancer Paternal Grandmother         bladder tumors   • Hypotension Maternal Grandmother    • Other Maternal Grandmother         lupus, tumor on lung   • Rheumatologic Disease Paternal Aunt         dermatomyositis       SOCIAL HISTORY    Social History     Social History   • Marital status:      Spouse name: michelle   • Number of children: 1   • Years of education: N/A     Occupational History   • stay at home mom      Social History Main Topics   • Smoking status: Never Smoker   • Smokeless tobacco: Never Used   • Alcohol use 0.6 - 1.2 oz/week     1 - 2 Standard drinks or equivalent per week   • Drug use: No   • Sexual activity: Yes     Partners: Male     Birth control/ protection: IUD     Other Topics Concern   • Caffeine Concern No   • Exercise Yes     pilates     Social History Narrative    She is not a smoker. She is drinking about 1 cup of coffee a day. 1 alcoholic drink per week. She is trying to walk every day for exercise. She is  with a 4 year old girl and 1 year twin  boy and girl.        SURGICAL HISTORY    Past Surgical History:   Procedure Laterality Date   • ------------other-------------      epidural steroid   •  section, low transverse  8-15-16   • Jackson tooth extraction       No family history of sudden death with surgery.  CURRENT MEDICATIONS    Current Outpatient Prescriptions   Medication Sig Dispense Refill   • norethindrone-ethinyl estradiol-FE (MICROGESTIN FE ) 1-20 MG-MCG per tablet Take 1 tablet by mouth daily. 84 tablet 4   • Levonorgestrel (MIRENA, 52 MG, IU) Placed 16       No current facility-administered medications for this visit.        ALLERGIES    ALLERGIES:  No Known Allergies    REVIEW OF SYSTEMS      14 point review of systems is negative except for that noted above and below:      None     PHYSICAL EXAM    VITAL SIGNS:    Visit Vitals  LMP 2018      Constitutional:  Alert and oriented x 3 and in nad   HENT:  Normocephalic. Atraumatic. Bilateral external ears normal. Oropharynx moist. No oral exudates. No tonsillar or uvular edema.   Neck: Normal range of motion. No tenderness. Supple. No stridor.    Eyes:  PERRL (Pupils equal, round, reactive to light), EOMI (extraocular movements intact). Conjunctivae normal. No discharge.    Cardiovascular:  Normal rate. Normal rhythm. No murmurs, gallops, or rubs.  2 + DP (dorsalis pedis) pulses with no lower extremity edema.  Respiratory:  No respiratory distress. Normal respiratory effort.  Normal breath sounds. No rales. No wheezing.    Gastrointestinal:  Bowel sounds normal. Soft. No tenderness. No distension.  No masses. No pulsatile masses. No hepatosplenomegaly.   Integument:  Warm. Dry. No erythema. No rash.    Neurologic:  Alert & oriented x 3.       Assessment    30 year old female with planned surgery as above.      Known risk factors for perioperative complications:  obesity     Patient is currently stable, medically optimized and ready for surgery.      plan    1. Preoperative  workup as follows No orders of the defined types were placed in this encounter.      2. Change in medication regimen before surgery:  Avoid any anti-inflammatories such as ibuprofen or advil, aspirin or any supplements 1 week prior to surgery.    3. Prophylaxis for cardiac events with perioperative beta-blockers:  none  4. Other measures:         (K80.20) Gall bladder stones  Plan: patient may proceed with surgery            A copy of this consultation will be sent to the referring physician, Dr. Osman Orellana           No

## 2023-06-03 NOTE — ED PROVIDER NOTE - PHYSICAL EXAMINATION
CONSTITUTIONAL:  in moderate acute distress.   SKIN: warm, dry  HEAD: Normocephalic; atraumatic.  EYES: PERRL, EOMI, normal sclera and conjunctiva   ENT: No nasal discharge; airway clear.  NECK: Supple; non tender.  CARD:  Regular rate and rhythm.   RESP: NO inc WOB   ABD: tender to left pelvis/hip  EXT: decreased rom at left hip. tender to left hip/femur, full rom at knee and below. no other complaints.   LYMPH: No acute cervical adenopathy.  NEURO: Alert, oriented, grossly unremarkable  PSYCH: Cooperative, appropriate.

## 2023-06-04 ENCOUNTER — TRANSCRIPTION ENCOUNTER (OUTPATIENT)
Age: 78
End: 2023-06-04

## 2023-06-04 LAB
ABO RH CONFIRMATION: SIGNIFICANT CHANGE UP
ANION GAP SERPL CALC-SCNC: 10 MMOL/L — SIGNIFICANT CHANGE UP (ref 7–14)
ANION GAP SERPL CALC-SCNC: 13 MMOL/L — SIGNIFICANT CHANGE UP (ref 7–14)
APPEARANCE UR: CLEAR — SIGNIFICANT CHANGE UP
BACTERIA # UR AUTO: NEGATIVE — SIGNIFICANT CHANGE UP
BASOPHILS # BLD AUTO: 0.01 K/UL — SIGNIFICANT CHANGE UP (ref 0–0.2)
BASOPHILS # BLD AUTO: 0.04 K/UL — SIGNIFICANT CHANGE UP (ref 0–0.2)
BASOPHILS NFR BLD AUTO: 0.1 % — SIGNIFICANT CHANGE UP (ref 0–1)
BASOPHILS NFR BLD AUTO: 0.4 % — SIGNIFICANT CHANGE UP (ref 0–1)
BILIRUB UR-MCNC: NEGATIVE — SIGNIFICANT CHANGE UP
BUN SERPL-MCNC: 31 MG/DL — HIGH (ref 10–20)
BUN SERPL-MCNC: 33 MG/DL — HIGH (ref 10–20)
CALCIUM SERPL-MCNC: 8.1 MG/DL — LOW (ref 8.4–10.5)
CALCIUM SERPL-MCNC: 8.3 MG/DL — LOW (ref 8.4–10.4)
CHLORIDE SERPL-SCNC: 102 MMOL/L — SIGNIFICANT CHANGE UP (ref 98–110)
CHLORIDE SERPL-SCNC: 103 MMOL/L — SIGNIFICANT CHANGE UP (ref 98–110)
CK MB CFR SERPL CALC: 4.6 NG/ML — SIGNIFICANT CHANGE UP (ref 0.6–6.3)
CK SERPL-CCNC: 357 U/L — HIGH (ref 0–225)
CO2 SERPL-SCNC: 17 MMOL/L — SIGNIFICANT CHANGE UP (ref 17–32)
CO2 SERPL-SCNC: 18 MMOL/L — SIGNIFICANT CHANGE UP (ref 17–32)
COD CRY URNS QL: NEGATIVE — SIGNIFICANT CHANGE UP
COLOR SPEC: YELLOW — SIGNIFICANT CHANGE UP
CREAT SERPL-MCNC: 0.7 MG/DL — SIGNIFICANT CHANGE UP (ref 0.7–1.5)
CREAT SERPL-MCNC: 0.8 MG/DL — SIGNIFICANT CHANGE UP (ref 0.7–1.5)
DIFF PNL FLD: NEGATIVE — SIGNIFICANT CHANGE UP
EGFR: 75 ML/MIN/1.73M2 — SIGNIFICANT CHANGE UP
EGFR: 88 ML/MIN/1.73M2 — SIGNIFICANT CHANGE UP
EOSINOPHIL # BLD AUTO: 0 K/UL — SIGNIFICANT CHANGE UP (ref 0–0.7)
EOSINOPHIL # BLD AUTO: 0.03 K/UL — SIGNIFICANT CHANGE UP (ref 0–0.7)
EOSINOPHIL NFR BLD AUTO: 0 % — SIGNIFICANT CHANGE UP (ref 0–8)
EOSINOPHIL NFR BLD AUTO: 0.3 % — SIGNIFICANT CHANGE UP (ref 0–8)
EPI CELLS # UR: 3 /HPF — SIGNIFICANT CHANGE UP (ref 0–5)
GLUCOSE BLDC GLUCOMTR-MCNC: 116 MG/DL — HIGH (ref 70–99)
GLUCOSE BLDC GLUCOMTR-MCNC: 175 MG/DL — HIGH (ref 70–99)
GLUCOSE BLDC GLUCOMTR-MCNC: 223 MG/DL — HIGH (ref 70–99)
GLUCOSE SERPL-MCNC: 160 MG/DL — HIGH (ref 70–99)
GLUCOSE SERPL-MCNC: 220 MG/DL — HIGH (ref 70–99)
GLUCOSE UR QL: NEGATIVE — SIGNIFICANT CHANGE UP
GRAN CASTS # UR COMP ASSIST: 0 /LPF — SIGNIFICANT CHANGE UP
HCT VFR BLD CALC: 21 % — LOW (ref 37–47)
HCT VFR BLD CALC: 21.8 % — LOW (ref 37–47)
HGB BLD-MCNC: 7 G/DL — LOW (ref 12–16)
HGB BLD-MCNC: 7.3 G/DL — LOW (ref 12–16)
HYALINE CASTS # UR AUTO: 2 /LPF — SIGNIFICANT CHANGE UP (ref 0–7)
IMM GRANULOCYTES NFR BLD AUTO: 0.4 % — HIGH (ref 0.1–0.3)
IMM GRANULOCYTES NFR BLD AUTO: 0.4 % — HIGH (ref 0.1–0.3)
KETONES UR-MCNC: ABNORMAL
LACTATE SERPL-SCNC: 1.8 MMOL/L — SIGNIFICANT CHANGE UP (ref 0.7–2)
LACTATE SERPL-SCNC: 2.3 MMOL/L — HIGH (ref 0.7–2)
LEUKOCYTE ESTERASE UR-ACNC: NEGATIVE — SIGNIFICANT CHANGE UP
LYMPHOCYTES # BLD AUTO: 0.62 K/UL — LOW (ref 1.2–3.4)
LYMPHOCYTES # BLD AUTO: 1.66 K/UL — SIGNIFICANT CHANGE UP (ref 1.2–3.4)
LYMPHOCYTES # BLD AUTO: 15.4 % — LOW (ref 20.5–51.1)
LYMPHOCYTES # BLD AUTO: 7.6 % — LOW (ref 20.5–51.1)
MAGNESIUM SERPL-MCNC: 1.8 MG/DL — SIGNIFICANT CHANGE UP (ref 1.8–2.4)
MAGNESIUM SERPL-MCNC: 2.4 MG/DL — SIGNIFICANT CHANGE UP (ref 1.8–2.4)
MCHC RBC-ENTMCNC: 30 PG — SIGNIFICANT CHANGE UP (ref 27–31)
MCHC RBC-ENTMCNC: 31.1 PG — HIGH (ref 27–31)
MCHC RBC-ENTMCNC: 33.3 G/DL — SIGNIFICANT CHANGE UP (ref 32–37)
MCHC RBC-ENTMCNC: 33.5 G/DL — SIGNIFICANT CHANGE UP (ref 32–37)
MCV RBC AUTO: 90.1 FL — SIGNIFICANT CHANGE UP (ref 81–99)
MCV RBC AUTO: 92.8 FL — SIGNIFICANT CHANGE UP (ref 81–99)
MONOCYTES # BLD AUTO: 0.64 K/UL — HIGH (ref 0.1–0.6)
MONOCYTES # BLD AUTO: 0.66 K/UL — HIGH (ref 0.1–0.6)
MONOCYTES NFR BLD AUTO: 6.1 % — SIGNIFICANT CHANGE UP (ref 1.7–9.3)
MONOCYTES NFR BLD AUTO: 7.9 % — SIGNIFICANT CHANGE UP (ref 1.7–9.3)
NEUTROPHILS # BLD AUTO: 6.84 K/UL — HIGH (ref 1.4–6.5)
NEUTROPHILS # BLD AUTO: 8.32 K/UL — HIGH (ref 1.4–6.5)
NEUTROPHILS NFR BLD AUTO: 77.4 % — HIGH (ref 42.2–75.2)
NEUTROPHILS NFR BLD AUTO: 84 % — HIGH (ref 42.2–75.2)
NITRITE UR-MCNC: NEGATIVE — SIGNIFICANT CHANGE UP
NRBC # BLD: 0 /100 WBCS — SIGNIFICANT CHANGE UP (ref 0–0)
NRBC # BLD: 0 /100 WBCS — SIGNIFICANT CHANGE UP (ref 0–0)
PH UR: 6 — SIGNIFICANT CHANGE UP (ref 5–8)
PHOSPHATE SERPL-MCNC: 3.4 MG/DL — SIGNIFICANT CHANGE UP (ref 2.1–4.9)
PHOSPHATE SERPL-MCNC: 3.9 MG/DL — SIGNIFICANT CHANGE UP (ref 2.1–4.9)
PLATELET # BLD AUTO: 146 K/UL — SIGNIFICANT CHANGE UP (ref 130–400)
PLATELET # BLD AUTO: 206 K/UL — SIGNIFICANT CHANGE UP (ref 130–400)
PMV BLD: 10.1 FL — SIGNIFICANT CHANGE UP (ref 7.4–10.4)
PMV BLD: 10.2 FL — SIGNIFICANT CHANGE UP (ref 7.4–10.4)
POTASSIUM SERPL-MCNC: 4.5 MMOL/L — SIGNIFICANT CHANGE UP (ref 3.5–5)
POTASSIUM SERPL-MCNC: 4.7 MMOL/L — SIGNIFICANT CHANGE UP (ref 3.5–5)
POTASSIUM SERPL-SCNC: 4.5 MMOL/L — SIGNIFICANT CHANGE UP (ref 3.5–5)
POTASSIUM SERPL-SCNC: 4.7 MMOL/L — SIGNIFICANT CHANGE UP (ref 3.5–5)
PROT UR-MCNC: ABNORMAL
RBC # BLD: 2.33 M/UL — LOW (ref 4.2–5.4)
RBC # BLD: 2.35 M/UL — LOW (ref 4.2–5.4)
RBC # FLD: 13.5 % — SIGNIFICANT CHANGE UP (ref 11.5–14.5)
RBC # FLD: 13.5 % — SIGNIFICANT CHANGE UP (ref 11.5–14.5)
RBC CASTS # UR COMP ASSIST: 2 /HPF — SIGNIFICANT CHANGE UP (ref 0–4)
SODIUM SERPL-SCNC: 131 MMOL/L — LOW (ref 135–146)
SODIUM SERPL-SCNC: 132 MMOL/L — LOW (ref 135–146)
SP GR SPEC: 1.02 — SIGNIFICANT CHANGE UP (ref 1.01–1.03)
TRI-PHOS CRY UR QL COMP ASSIST: NEGATIVE — SIGNIFICANT CHANGE UP
TROPONIN T SERPL-MCNC: <0.01 NG/ML — SIGNIFICANT CHANGE UP
URATE CRY FLD QL MICRO: NEGATIVE — SIGNIFICANT CHANGE UP
UROBILINOGEN FLD QL: SIGNIFICANT CHANGE UP
WBC # BLD: 10.75 K/UL — SIGNIFICANT CHANGE UP (ref 4.8–10.8)
WBC # BLD: 8.14 K/UL — SIGNIFICANT CHANGE UP (ref 4.8–10.8)
WBC # FLD AUTO: 10.75 K/UL — SIGNIFICANT CHANGE UP (ref 4.8–10.8)
WBC # FLD AUTO: 8.14 K/UL — SIGNIFICANT CHANGE UP (ref 4.8–10.8)
WBC UR QL: 3 /HPF — SIGNIFICANT CHANGE UP (ref 0–5)

## 2023-06-04 PROCEDURE — 99222 1ST HOSP IP/OBS MODERATE 55: CPT | Mod: 25

## 2023-06-04 PROCEDURE — 93280 PM DEVICE PROGR EVAL DUAL: CPT | Mod: 26

## 2023-06-04 PROCEDURE — 99222 1ST HOSP IP/OBS MODERATE 55: CPT

## 2023-06-04 PROCEDURE — 71045 X-RAY EXAM CHEST 1 VIEW: CPT | Mod: 26

## 2023-06-04 PROCEDURE — 93306 TTE W/DOPPLER COMPLETE: CPT | Mod: 26

## 2023-06-04 PROCEDURE — 93010 ELECTROCARDIOGRAM REPORT: CPT

## 2023-06-04 PROCEDURE — 99232 SBSQ HOSP IP/OBS MODERATE 35: CPT

## 2023-06-04 PROCEDURE — 99291 CRITICAL CARE FIRST HOUR: CPT

## 2023-06-04 PROCEDURE — 27245 TREAT THIGH FRACTURE: CPT | Mod: LT

## 2023-06-04 RX ORDER — KETOROLAC TROMETHAMINE 30 MG/ML
15 SYRINGE (ML) INJECTION EVERY 6 HOURS
Refills: 0 | Status: DISCONTINUED | OUTPATIENT
Start: 2023-06-04 | End: 2023-06-04

## 2023-06-04 RX ORDER — ACETAMINOPHEN 500 MG
1000 TABLET ORAL ONCE
Refills: 0 | Status: DISCONTINUED | OUTPATIENT
Start: 2023-06-04 | End: 2023-06-04

## 2023-06-04 RX ORDER — SODIUM CHLORIDE 9 MG/ML
250 INJECTION, SOLUTION INTRAVENOUS ONCE
Refills: 0 | Status: DISCONTINUED | OUTPATIENT
Start: 2023-06-04 | End: 2023-06-05

## 2023-06-04 RX ORDER — CEFAZOLIN SODIUM 1 G
2000 VIAL (EA) INJECTION EVERY 8 HOURS
Refills: 0 | Status: COMPLETED | OUTPATIENT
Start: 2023-06-04 | End: 2023-06-05

## 2023-06-04 RX ORDER — ASPIRIN/CALCIUM CARB/MAGNESIUM 324 MG
81 TABLET ORAL DAILY
Refills: 0 | Status: DISCONTINUED | OUTPATIENT
Start: 2023-06-04 | End: 2023-06-04

## 2023-06-04 RX ORDER — SODIUM CHLORIDE 9 MG/ML
1000 INJECTION, SOLUTION INTRAVENOUS
Refills: 0 | Status: DISCONTINUED | OUTPATIENT
Start: 2023-06-04 | End: 2023-06-04

## 2023-06-04 RX ORDER — SODIUM CHLORIDE 9 MG/ML
1000 INJECTION INTRAMUSCULAR; INTRAVENOUS; SUBCUTANEOUS
Refills: 0 | Status: DISCONTINUED | OUTPATIENT
Start: 2023-06-04 | End: 2023-06-05

## 2023-06-04 RX ORDER — GABAPENTIN 400 MG/1
300 CAPSULE ORAL THREE TIMES A DAY
Refills: 0 | Status: DISCONTINUED | OUTPATIENT
Start: 2023-06-04 | End: 2023-06-05

## 2023-06-04 RX ORDER — SODIUM CHLORIDE 9 MG/ML
500 INJECTION INTRAMUSCULAR; INTRAVENOUS; SUBCUTANEOUS ONCE
Refills: 0 | Status: COMPLETED | OUTPATIENT
Start: 2023-06-04 | End: 2023-06-04

## 2023-06-04 RX ORDER — ATORVASTATIN CALCIUM 80 MG/1
20 TABLET, FILM COATED ORAL AT BEDTIME
Refills: 0 | Status: DISCONTINUED | OUTPATIENT
Start: 2023-06-04 | End: 2023-06-14

## 2023-06-04 RX ORDER — ALPRAZOLAM 0.25 MG
0.25 TABLET ORAL DAILY
Refills: 0 | Status: DISCONTINUED | OUTPATIENT
Start: 2023-06-04 | End: 2023-06-05

## 2023-06-04 RX ORDER — ENOXAPARIN SODIUM 100 MG/ML
40 INJECTION SUBCUTANEOUS EVERY 24 HOURS
Refills: 0 | Status: DISCONTINUED | OUTPATIENT
Start: 2023-06-04 | End: 2023-06-04

## 2023-06-04 RX ORDER — MAGNESIUM SULFATE 500 MG/ML
2 VIAL (ML) INJECTION ONCE
Refills: 0 | Status: COMPLETED | OUTPATIENT
Start: 2023-06-04 | End: 2023-06-04

## 2023-06-04 RX ORDER — OXYBUTYNIN CHLORIDE 5 MG
5 TABLET ORAL THREE TIMES A DAY
Refills: 0 | Status: DISCONTINUED | OUTPATIENT
Start: 2023-06-04 | End: 2023-06-04

## 2023-06-04 RX ORDER — ONDANSETRON 8 MG/1
4 TABLET, FILM COATED ORAL EVERY 6 HOURS
Refills: 0 | Status: DISCONTINUED | OUTPATIENT
Start: 2023-06-04 | End: 2023-06-05

## 2023-06-04 RX ORDER — SODIUM CHLORIDE 9 MG/ML
500 INJECTION, SOLUTION INTRAVENOUS ONCE
Refills: 0 | Status: COMPLETED | OUTPATIENT
Start: 2023-06-04 | End: 2023-06-04

## 2023-06-04 RX ORDER — SENNA PLUS 8.6 MG/1
2 TABLET ORAL AT BEDTIME
Refills: 0 | Status: DISCONTINUED | OUTPATIENT
Start: 2023-06-04 | End: 2023-06-06

## 2023-06-04 RX ORDER — NOREPINEPHRINE BITARTRATE/D5W 8 MG/250ML
0.05 PLASTIC BAG, INJECTION (ML) INTRAVENOUS
Qty: 8 | Refills: 0 | Status: DISCONTINUED | OUTPATIENT
Start: 2023-06-04 | End: 2023-06-05

## 2023-06-04 RX ORDER — ALBUMIN HUMAN 25 %
500 VIAL (ML) INTRAVENOUS ONCE
Refills: 0 | Status: COMPLETED | OUTPATIENT
Start: 2023-06-04 | End: 2023-06-04

## 2023-06-04 RX ORDER — HYDROMORPHONE HYDROCHLORIDE 2 MG/ML
0.5 INJECTION INTRAMUSCULAR; INTRAVENOUS; SUBCUTANEOUS
Refills: 0 | Status: DISCONTINUED | OUTPATIENT
Start: 2023-06-04 | End: 2023-06-04

## 2023-06-04 RX ORDER — HYDROMORPHONE HYDROCHLORIDE 2 MG/ML
2 INJECTION INTRAMUSCULAR; INTRAVENOUS; SUBCUTANEOUS ONCE
Refills: 0 | Status: DISCONTINUED | OUTPATIENT
Start: 2023-06-04 | End: 2023-06-04

## 2023-06-04 RX ORDER — HYDROMORPHONE HYDROCHLORIDE 2 MG/ML
0.25 INJECTION INTRAMUSCULAR; INTRAVENOUS; SUBCUTANEOUS EVERY 4 HOURS
Refills: 0 | Status: DISCONTINUED | OUTPATIENT
Start: 2023-06-04 | End: 2023-06-07

## 2023-06-04 RX ORDER — OXYCODONE HYDROCHLORIDE 5 MG/1
5 TABLET ORAL ONCE
Refills: 0 | Status: DISCONTINUED | OUTPATIENT
Start: 2023-06-04 | End: 2023-06-04

## 2023-06-04 RX ORDER — ACETAMINOPHEN 500 MG
650 TABLET ORAL EVERY 6 HOURS
Refills: 0 | Status: DISCONTINUED | OUTPATIENT
Start: 2023-06-04 | End: 2023-06-14

## 2023-06-04 RX ORDER — INSULIN LISPRO 100/ML
VIAL (ML) SUBCUTANEOUS
Refills: 0 | Status: DISCONTINUED | OUTPATIENT
Start: 2023-06-04 | End: 2023-06-05

## 2023-06-04 RX ORDER — METOPROLOL TARTRATE 50 MG
25 TABLET ORAL EVERY 12 HOURS
Refills: 0 | Status: DISCONTINUED | OUTPATIENT
Start: 2023-06-04 | End: 2023-06-14

## 2023-06-04 RX ADMIN — SODIUM CHLORIDE 100 MILLILITER(S): 9 INJECTION INTRAMUSCULAR; INTRAVENOUS; SUBCUTANEOUS at 21:11

## 2023-06-04 RX ADMIN — Medication 25 GRAM(S): at 00:58

## 2023-06-04 RX ADMIN — Medication 6.81 MICROGRAM(S)/KG/MIN: at 17:20

## 2023-06-04 RX ADMIN — Medication 650 MILLIGRAM(S): at 00:58

## 2023-06-04 RX ADMIN — Medication 25 GRAM(S): at 17:00

## 2023-06-04 RX ADMIN — GABAPENTIN 300 MILLIGRAM(S): 400 CAPSULE ORAL at 06:38

## 2023-06-04 RX ADMIN — GABAPENTIN 300 MILLIGRAM(S): 400 CAPSULE ORAL at 21:12

## 2023-06-04 RX ADMIN — SODIUM CHLORIDE 500 MILLILITER(S): 9 INJECTION, SOLUTION INTRAVENOUS at 16:03

## 2023-06-04 RX ADMIN — Medication 650 MILLIGRAM(S): at 18:35

## 2023-06-04 RX ADMIN — AMLODIPINE BESYLATE 5 MILLIGRAM(S): 2.5 TABLET ORAL at 06:38

## 2023-06-04 RX ADMIN — Medication 650 MILLIGRAM(S): at 07:12

## 2023-06-04 RX ADMIN — SODIUM CHLORIDE 500 MILLILITER(S): 9 INJECTION INTRAMUSCULAR; INTRAVENOUS; SUBCUTANEOUS at 21:12

## 2023-06-04 RX ADMIN — Medication 100 MILLIGRAM(S): at 21:13

## 2023-06-04 RX ADMIN — HYDROMORPHONE HYDROCHLORIDE 2 MILLIGRAM(S): 2 INJECTION INTRAMUSCULAR; INTRAVENOUS; SUBCUTANEOUS at 08:37

## 2023-06-04 RX ADMIN — Medication 5 MILLIGRAM(S): at 06:38

## 2023-06-04 RX ADMIN — Medication 650 MILLIGRAM(S): at 06:37

## 2023-06-04 RX ADMIN — ATORVASTATIN CALCIUM 20 MILLIGRAM(S): 80 TABLET, FILM COATED ORAL at 21:12

## 2023-06-04 RX ADMIN — Medication 25 MILLIGRAM(S): at 06:38

## 2023-06-04 RX ADMIN — Medication 250 MILLILITER(S): at 14:50

## 2023-06-04 RX ADMIN — HYDROMORPHONE HYDROCHLORIDE 0.25 MILLIGRAM(S): 2 INJECTION INTRAMUSCULAR; INTRAVENOUS; SUBCUTANEOUS at 08:42

## 2023-06-04 RX ADMIN — HYDROMORPHONE HYDROCHLORIDE 2 MILLIGRAM(S): 2 INJECTION INTRAMUSCULAR; INTRAVENOUS; SUBCUTANEOUS at 09:37

## 2023-06-04 RX ADMIN — HYDROMORPHONE HYDROCHLORIDE 0.25 MILLIGRAM(S): 2 INJECTION INTRAMUSCULAR; INTRAVENOUS; SUBCUTANEOUS at 08:57

## 2023-06-04 NOTE — PROGRESS NOTE ADULT - ASSESSMENT
78F PMHx hypertension hyperlipidemia CAD with stents neuropathy seen as a trauma consult coming in here for fall found to have left femur fracture. Trauma assessment in ED: ABCs intact , GCS 15 , AAOx3,  GREENE.     Injuries identified:   - Left femur fracture    PLAN:   - Ortho left femur fracture - ORIF 6/4 tentatively pending cardiac/med risk strat  - Pending echo and pacemaker interrogation  - NPO with IVF  - Preop for OR tomorrow w/ ortho  - Pain control  - Bowel regimen  - Home medications: reinstate as appropriate | holding lisinopril for hyperkalemia per medicine  - Medical and Cardiac clearances prior to OR  - Medical comanagement  - Monitor I/Os  - VTE ppx - holding for OR    Trauma/ACS  Spectra [phone]

## 2023-06-04 NOTE — PROGRESS NOTE ADULT - SUBJECTIVE AND OBJECTIVE BOX
Orthopedic Progress/Postoperative Check Note    Patient seen and examined s/p Left hip IMN 6/4/23-POD0  Patient tolerated procedure well  Denies current SOB, nausea,vomiting, numbness, tingling  Patient requiring low dose pressors due to postoperative MAP 50-60 in PACU  Postoperative HGB 7.3 to 7.0 - bolus and 1uPRBC with post transfusion CBC to be followed      PE  Awake, alert  NAD  Breathing on RA    LLE  Skin and dressing clean,dry,intact  Compartments soft and compressible, no pain with passive stretch digits  SILT: Tibial,sural,saph,spn,dpn  Motor: Firing EHL,FHL,TA,Gastroc  DP 2+   Toes wwp    AP:  79F s/p left hip IMN 6/4/23-POD0  -WBAT LLE  -Ancef 2g Q8 24 hours (3 doses)  -AM labs (evaluation HGB)- follow-up transfusion CBC  -Diet  -Pain control  -ICS machine  -SCD machine  -Resume [employer] VTE prophylaxis per primary, please dc patient on 6 weeks  daily if no contraindication  -Home medications  -PT/OT for disposition recommendations and gait training   -Orthopedics to follow

## 2023-06-04 NOTE — CONSULT NOTE ADULT - ASSESSMENT
Assessment & Plan    78F HTN/HLD, CAD, AS s/p TAVR, admitted 6/3/23 mechanical fall found to have left femur fracture now s/p ORIF 6/4/23 c/b post-op hypotension requiring SICU admission    NEURO:  #Acute pain post-op    -APAP prn, Gabapentin 300mg TID     RESP:   #Oxygenation    -wean off NC to RA as tolerated  #Activity    -increase as tolerated  #if intubated document date of intubation/ETT size and LL    CARDS:   #  Imaging:   Labs:     GI/NUTR:   #Diet, Regular    Diet, Regular (06-04-23 @ 14:15) [Active]           -if NG tube in place, document nare length and order q4 adherance    -aspiration precautions, HOB 30  #GI Prophylaxis    -not indicated  #Bowel regimen    -senna/miralax if indicated    -last bowel movement      /RENAL:   #urine output in critically ill           HEME/ONC:   #DVT prophylaxis    -, SCDs    -if DVT prophylaxis to be held, document and place VTE order     Labs: Hb/Hct:  10.0/31.1  -->,  7.3/21.8  -->                      Plts:  229  -->,  206  -->                 PTT/INR:  30.8/0.97  --->     T&S Expires:   Blood Consent-obtain if acute anemia, q6 CBC    ID:  #leukocytosis       ENDO:    -FSG q6 if NPO or Tube feeds    -Glucose goal 140-180. if above 180 start ISS    MSK:     Activity - Weight Bearing Status:     Left Lower Extremity:  Weight Bearing As Tolerated (06-04-23 @ 14:15)  Activity - Ambulate as Tolerated:     Time/Priority:  Routine (06-04-23 @ 14:12)        LINES/DRAINS:  PIV, Garrison    ADVANCED DIRECTIVES:  Full Code    HCP/Emergency Contact-    INDICATION FOR SICU/SDU: Fall             DISPO:   Case discussed with attending   Assessment & Plan    78F HTN/HLD, CAD, AS s/p TAVR, admitted 6/3/23 mechanical fall found to have left femur fracture now s/p ORIF 6/4/23 c/b post-op hypotension requiring SICU admission    NEURO:  #Acute pain post-op  -APAP prn  -Gabapentin 300mg TID    #hx anxiety  -continue home xanax 0.25mg PRN  -delirium prevention with frequent reorientation     RESP:   -intubated for OR and extubated successfully in PACU to 2L NC  -tolerating RA  -incentive spirometry  -post-op CXR pending    CARDS:   #hypotension likely 2/2 hypovolemia post-op  - Resuscitate with fluids and PRBC as ordered  - NICOM pending  - cardiac enzymes ordered  - holding home anti-HTN meds (lisinopril, metoprolol, amodipine, chlorthalidone)    #hx CAD aortic stenosis s/p TAVR, LBBB s/p PPM  - Last     - home ASA held until Hgb stable  - home statin    GI/NUTR:   - NPO except for sips/chips  - Once stabilized, advance diet as appropriate  - No recorded BM since admit, though bowel sounds appreciated, hold regimen  - No GI ppx indicated, consider if necessitating pressors    /RENAL:   #suspected hypovolemia  - Received 1L in OR; 500cc albumin + 500cc LR in PACU prior to SICU consult; additional 1L LR given in PACU = 3L given prior to arrival in ICU  - BUN 33/ Cre 0.8  - No UOP intra-op  - Garrison placed as no void noted and hemodynamically fluctuant     HEME/ONC:   - Hgb drop from pre op 10 to 7.3,  => 1 PRBC ordered  - Lovenox for DVT ppx and home ASA originally restarted per SGY, but holding until confirmed Hgb stable  - Mechanical ppx for now  - Labs:     Hb/Hct:  10.0/31.1  -->,  7.3/21.8  -->             Plts:  229  -->,  206  -->       PTT/INR:  30.8/0.97  --->     T&S Expires: 6/7/23      ID:  - Ancef for 24h post-op  - Trend WBC and fever curve    ENDO:  #DM  - BG Q6h  - sliding scale insulin    MSK:  #L femur fracture s/p ORIF w/intramedullar hip screw  -Left Lower Extremity:  Weight Bearing As Tolerated (06-04-23 @ 14:15)  -Activity - Ambulate as Tolerated Time/Priority:  Routine (06-04-23 @ 14:12)        LINES/DRAINS:  PIV, Garrison (6/4/23)    ADVANCED DIRECTIVES:  Full Code    HCP/Emergency Contact-    INDICATION FOR SICU/SDU: hypotension post-op    DISPO:   Case discussed with attending Dr. Mata  Assessment & Plan    78F HTN/HLD, CAD, AS s/p TAVR, admitted 6/3/23 mechanical fall found to have left femur fracture now s/p ORIF 6/4/23 c/b post-op hypotension requiring SICU admission    NEURO:  #Acute pain post-op  -APAP prn  -Gabapentin 300mg TID    #hx anxiety  -continue home xanax 0.25mg PRN  -delirium prevention with frequent reorientation     RESP:   -intubated for OR and extubated successfully in PACU to 2L NC  -tolerating RA  -incentive spirometry  -post-op CXR pending    CARDS:   #hypotension likely 2/2 hypovolemia post-op  - Resuscitate with fluids and PRBC as ordered  - NICOM pending  - cardiac enzymes ordered  - holding home anti-HTN meds (lisinopril, metoprolol, amodipine, chlorthalidone)    #hx CAD aortic stenosis s/p TAVR  - home ASA held until Hgb stable  - home statin    #s/p PPM  - last EP interrogation 6/4:     [employer] DC PPM interrogated 6/4/23    Episode of VT x 8 seconds 5/28/23    Several brief episodes NSVT    Device functioning properly      GI/NUTR:   - NPO except for sips/chips  - Once stabilized, advance diet as appropriate  - No recorded BM since admit, though bowel sounds appreciated, hold regimen  - No GI ppx indicated, consider if necessitating pressors    /RENAL:   #suspected hypovolemia  - Received 1L in OR; 500cc albumin + 500cc LR in PACU prior to SICU consult; additional 1L LR given in PACU = 3L given prior to arrival in ICU  - BUN 33/ Cre 0.8  - No UOP intra-op  - Garrison placed as no void noted and hemodynamically fluctuant     HEME/ONC:   - Hgb drop from pre op 10 to 7.3,  => 1 PRBC ordered  - Lovenox for DVT ppx and home ASA originally restarted per SGY, but holding until confirmed Hgb stable  - Mechanical ppx for now  - Labs:     Hb/Hct:  10.0/31.1  -->,  7.3/21.8  -->             Plts:  229  -->,  206  -->       PTT/INR:  30.8/0.97  --->     T&S Expires: 6/7/23      ID:  - Ancef for 24h post-op  - Trend WBC and fever curve    ENDO:  #DM  - BG Q6h  - sliding scale insulin    MSK:  #L femur fracture s/p ORIF w/intramedullar hip screw  -PT/OT  -Left Lower Extremity:  Weight Bearing As Tolerated (06-04-23 @ 14:15)  -Activity - Ambulate as Tolerated Time/Priority:  Routine (06-04-23 @ 14:12)        LINES/DRAINS:  PIV, Garrison (6/4/23)    ADVANCED DIRECTIVES:  Full Code    HCP/Emergency Contact-    INDICATION FOR SICU/SDU: hypotension post-op    DISPO:   Case discussed with attending Dr. Mata  Assessment & Plan    78F HTN/HLD, CAD, AS s/p TAVR, admitted 6/3/23 mechanical fall found to have left femur fracture now s/p ORIF 6/4/23 c/b post-op hypotension requiring SICU admission    NEURO:  #Acute pain post-op  -APAP prn  -Gabapentin 300mg TID    #hx anxiety  -continue home xanax 0.25mg PRN  -delirium prevention with frequent reorientation     RESP:   -intubated for OR and extubated successfully in PACU to 2L NC  -tolerating RA  -incentive spirometry  -post-op CXR pending    CARDS:   #hypotension likely 2/2 hypovolemia post-op  - Resuscitate with fluids and PRBC as ordered  - NICOM pending  - cardiac enzymes ordered  - holding home anti-HTN meds (lisinopril, metoprolol, amodipine, chlorthalidone)    #hx CAD aortic stenosis s/p TAVR March 2021  - home ASA held until Hgb stable  - home statin    #CHB with LBBB s/p PPM March 2021  - EKG 6/4/23 NSR with 1st degree HB  - last EP interrogation 6/4:     [employer] DC PPM interrogated 6/4/23    Episode of VT x 8 seconds 5/28/23    Several brief episodes NSVT    Device functioning properly      GI/NUTR:   - NPO except for sips/chips  - Once stabilized, advance diet as appropriate  - No recorded BM since admit, though bowel sounds appreciated, hold regimen  - No GI ppx indicated, consider if necessitating pressors    /RENAL:   #suspected hypovolemia  - Received 1L in OR; 500cc albumin + 500cc LR in PACU prior to SICU consult; additional 1L LR given in PACU = 3L given prior to arrival in ICU  - BUN 33/ Cre 0.8  - No UOP intra-op  - Garrison placed for retention and hemodynamically fluctuations     HEME/ONC:   - Hgb drop from pre op 10 to 7.3,  => 1 PRBC ordered  - Lovenox for DVT ppx and home ASA originally restarted per SGY, but holding until confirmed Hgb stable  - Mechanical ppx for now  - Labs:     Hb/Hct:  10.0/31.1  -->,  7.3/21.8  -->             Plts:  229  -->,  206  -->       PTT/INR:  30.8/0.97  --->     T&S Expires: 6/7/23      ID:  - Ancef for 24h post-op  - Trend WBC and fever curve    ENDO:  #DM  - BG Q6h  - sliding scale insulin    MSK:  #L femur fracture s/p ORIF w/intramedullar hip screw  -PT/OT  -Left Lower Extremity:  Weight Bearing As Tolerated (06-04-23 @ 14:15)  -Activity - Ambulate as Tolerated Time/Priority:  Routine (06-04-23 @ 14:12)        LINES/DRAINS:  Bladimir CHRIS (6/4/23)    ADVANCED DIRECTIVES:  Full Code    HCP/Emergency Contact-    INDICATION FOR SICU/SDU: hypotension post-op    DISPO:   Case discussed with attending Dr. Mata  Assessment & Plan    78F HTN/HLD, CAD, AS s/p TAVR, admitted 6/3/23 mechanical fall found to have left femur fracture now s/p ORIF 6/4/23 c/b post-op hypotension requiring SICU admission    NEURO:  #Acute pain post-op  -APAP prn  -Gabapentin 300mg TID    #hx anxiety  -continue home xanax 0.25mg PRN  -delirium prevention with frequent reorientation     RESP:   -intubated for OR and extubated successfully in PACU to 2L NC  -tolerating RA  -incentive spirometry  -post-op CXR pending    CARDS:   #hypotension likely 2/2 hypovolemia post-op  - Resuscitate with fluids and PRBC as ordered  - NICOM pending  - cardiac enzymes ordered  - holding home anti-HTN meds (lisinopril, metoprolol, amodipine, chlorthalidone)    #hx CAD aortic stenosis s/p TAVR March 2021  - home ASA held until Hgb stable  - home statin    #CHB with LBBB s/p PPM March 2021  - EKG 6/4/23 NSR with 1st degree HB  - last EP interrogation 6/4:     [employer] DC PPM interrogated 6/4/23    Episode of VT x 8 seconds 5/28/23    Several brief episodes NSVT    Device functioning properly      GI/NUTR:   - NPO except for sips/chips  - Once stabilized, advance diet as appropriate  - No recorded BM since admit, though bowel sounds appreciated, hold regimen  - No GI ppx indicated, consider if necessitating pressors    /RENAL:   #suspected hypovolemia  - Received 1L in OR; 500cc albumin + 500cc LR in PACU prior to SICU consult; additional 1L LR given in PACU = 3L given prior to arrival in ICU  - BUN 33/ Cre 0.8  - No UOP intra-op  - Garrison placed for retention and hemodynamically fluctuations     HEME/ONC:   - Hgb drop from pre op 10 to 7.3,  => 1 PRBC ordered  - Lovenox for DVT ppx and home ASA originally restarted per SGY, but holding until confirmed Hgb stable  - Mechanical ppx for now  - Labs:     Hb/Hct:  10.0/31.1  -->,  7.3/21.8  -->             Plts:  229  -->,  206  -->       PTT/INR:  30.8/0.97  --->     T&S Expires: 6/7/23      ID:  - Ancef for 24h post-op  - Trend WBC and fever curve    ENDO:  #DM  - BG Q6h  - sliding scale insulin    MSK:  #L femur fracture s/p ORIF w/intramedullar hip screw  -PT/OT  -Left Lower Extremity:  Weight Bearing As Tolerated (06-04-23 @ 14:15)  -Activity - Ambulate as Tolerated Time/Priority:  Routine (06-04-23 @ 14:12)        LINES/DRAINS:  Bladimir CHRIS (6/4/23)    ADVANCED DIRECTIVES:  Full Code    HCP/Emergency Contact- Ehsan Zamora  [phone], Malika Garcia sister [phone]    INDICATION FOR SICU/SDU: hypotension post-op    DISPO:   Case discussed with attending Dr. Mata

## 2023-06-04 NOTE — CONSULT NOTE ADULT - ASSESSMENT
77yo Female with a PMH of HTN, HLD, CAD s/p cardiac cath with KILEY placement admitted for L hip fx. EP consulted to interrogate PPM    [employer] DC PPM interrogated 6/4/23  Episode of VT x 8 seconds 5/28/23  Several brief episodes NSVT  Device functioning properly    Impression:  L Hip Fx  NSVT x 8 seconds 5/28/23  Hx CAD sp PCI  HTN  HLD    Plan:  - Recommend ischemic workup prior to discharge  - Cont care for L hip fx  - Cardiology consult for follow up  - Patient can fu with Dr De La Cruz in 4-6 weeks as outpatient  - No further EP intervention, recall as needed [id number] 77yo Female with a PMH of HTN, HLD, CAD s/p cardiac cath with KILEY placement admitted for L hip fx. EP consulted to interrogate PPM    [employer] DC PPM interrogated 6/4/23  Episode of VT x 8 seconds 5/28/23  Several brief episodes NSVT  Device functioning properly    Impression:  L Hip Fx  NSVT x 8 seconds 5/28/23   Hx CAD sp PCI  HTN  HLD    Plan:  - Recommend ischemic workup prior to discharge  - Cont care for L hip fx  - Cardiology consult for follow up  - Patient can fu with Dr De La Cruz in 4-6 weeks as outpatient  - No further EP intervention, recall as needed [id number]

## 2023-06-04 NOTE — PROGRESS NOTE ADULT - ASSESSMENT
77 y/o with hx of HTN, Diet controlled DM< HLD, CAD with KILEY mLAD 1/29/21, LS compression fracture, neuropathy, severe OA, s/p TAVR complicated by CHB s/p PPM in 3/2021, h/o TIA, admitted s/p  mechanical fall, found to have Left Subtrochanteric fracture, planned for OR with ortho 6/4. [facility] consulted for risk stratification    At baseline, patient lives at [facility], ambulates with a rollator    Left Subtrochanteric fracture s/p fall  AS s/p TAVr, complicated by CHB s/p PPM 2021 - has not followed with cardio in >1 year  Diet controlled DM  HTN  DLD  neuropathy  HO TIA    PLANs    pt was seen this morning before going to OR  pending Echo and PPM interrogation   Currently patient denies any CP, SOB, dizziness or lightheadedness   but reports NEELY   cardio consulted, pt is at high risk, if going to OR should be w cardiac anesthesia,   METS<4, RCRI class III risk, patient is at high risk for orthopedic surgery  The decision to proceed with the surgery/procedure is made by the performing physician and the patient.   Hold lisinopril for hyperkalemia  Cautious with IVF to avoid fluid overload

## 2023-06-04 NOTE — CONSULT NOTE ADULT - ATTENDING COMMENTS
pt seen and examined  agree with above  pt is high risk for surgery   understands r/b/a and op vs nonop  will proceed with left hip ORIF
Briefly, 78 year old woman for whom cardiology is consulted for preoperative risk stratification. The patient does have shortness of breath with exertion and she has had this ever since her TAVR surgery and PPM placement. She has a murmur on exam but otherwise is euvolemic. Her last echocardiogram showed a dynamic intracavitary gradient of 100 mmHg. Her current EKG shows a RBBB, I do not have others in the system to compare. Overall, she is at high risk for this intermediate risk surgery. Would try to get an echocardiogram before the surgery to see if her intracavitary gradient is still present, but if surgery is urgent, would certainly use cardiac anesthesia with close monitoring of her volume status. Continue all cardiac medications edwin-operatively.   This consult serves only as a perioperative cardiac risk stratification and evaluation to predict 30-day cardiac complications risk and mortality. The decision to proceed with the surgery/procedure is made by the performing physician and the patient.
Patient is hypotensive postop in PACU.  Underwent ORIF or left femur fracture.  Hb 7.3 from 10 preop.    AAO x3  Chest: has decreased breath sounds in bilateral lung bases.  CV: rrr  Abdomen: soft  Extr: mild edema of left thigh, dressing dry    ASSESSMENT:  77 y/o female, S/P Fall.  Closed Left Shaft Femur Fracture.  S/P ORIF.  Postoperative hypotension.  Acute pain due to trauma  Acute blood loss anemia.  Atelectasis.  At risk for hemodynamic instability.    PLAN:  - continuous O2Sat and hemodynamic monitoring  - use O2 with NC as needed  - incentive spirometer  - keep MAP>65; hypovolemic - iv bolus; use Nicom as needed; on Levo at 0.03 mcg/kg/min at this time  - liquid diet  - follow serum electrolytes and UOP  - transfuse 1 unit PRBC; follow H&H  - ID - universal precautions  - DVT prophylaxis  Admit to SICU

## 2023-06-04 NOTE — CHART NOTE - NSCHARTNOTEFT_GEN_A_CORE
Patient examined in PACU immediately post-operatively, noted to be hypotensive to 70s/40s with MAPs between 50-55. Per anesthesia record, required pushes of phenylephrine (was briefly on drip for ~30 minutes) intraoperatively. Received 1 push of Phenylephrine & 500cc of albumin in PACU with improvement of MAP to high 60s. STAT labs sent & STAT EKG ordered. Given cardiac history and high risk profile, patient upgraded to SICU for hemodynamic monitoring. Case discussed with SICU/Trauma attending Dr. Mata.

## 2023-06-04 NOTE — PRE-OP CHECKLIST - 1.
Confirmation specimen needed as per blood bank; anesthesia & OR RN made aware. Dr. Owusu made aware EPS consult may still be pending-as per patient pacemaker was interrogated this morning. OR MELLISSA Mace also notified.

## 2023-06-04 NOTE — CONSULT NOTE ADULT - SUBJECTIVE AND OBJECTIVE BOX
RAFAEL NO   MRN-[id number]  78y ([birth year])F      Admit Date/LOS: 06-03 (1d)  Indication for SDU/SICU: hypotension post-op  OR 6/4/23 (Day #)        ----------------------------------------------------------------------------------------------------------  HPI   78F TIA residual RLE weakness(2014), HTN, HLD, CAD s/p KILEY to mLAD stent (Jan 2021), AS s/p TAVR, DM, OA, compression fx admitted 6/3/23 as a trauma after a mechanical fall onto her left back and femur earlier today, and unable to ambulate afterwards.  Denies head trauma LOC or AC. Xray LLE +left femur fracture. Taken to OR 6/4/23 with Dr Haque for open reduction and internal fixation of left hip using intramedullary hip screw.    OR Stats  OR Time:  12:37 to 13:49 (1 hour 12 min)  EBL: 150cc  IV Fluids: 1000cc  Blood Products: none  UOP: 0cc  Required phenylephrine pushesx2 and brief gtt intra op  Findings: Subtrochanteric fracture Left hip, fracture site puncture through fascia     Extubated successfully in PACU but became hypotensive, transiently responsive to 500cc 5%albumin bolus, 500cc LR bolus, and phenylephrine IVP given by anesthesia. After bolus, SBP 80s MAPs fluctuating between 50-60s, prompting SICU consult. Lactate 2.3 Additional 500cc x2 given. (Total fluid given so far 3L) Post op Hgb 7.3 from 10, PRBC x1 ordered     24 Hour Events  - Admission under SICU service  - NICOM and cardiac enzymes pending  - Repeat Hgb after PRBC      Consults-  Consult Note Adult-Internal Medicine Attending [OTIS Garcia] (06-03-23)  Consult Note Adult-Cardiology Fellow/Attending [D. Khan, S. Behuria] (06-03-23)  Consult Note Adult-Orthopedics Resident/Attending [RENAE Garcia] (06-03-23)        [X] A ten-point review of systems was otherwise negative except as noted above.  [  ] Due to altered mental status/intubation, subjective information was not attained from the patient. History was obtained, to the extent possible, from review of the chart and collateral sources of information.    ----------------------------------------------------------------------------------------------------------      PMH  PAST MEDICAL & SURGICAL HISTORY:  Hypertension      HLD (hyperlipidemia)      CAD (coronary artery disease)      OA (osteoarthritis)      Compression fracture of spine  2015 lumbar      Aortic stenosis      Compression fracture of spine      Type 2 diabetes mellitus  diet controlled      OAB (overactive bladder)      Transient ischemic attack (TIA)  Jan 2014- with residual weakness on right leg      Falls      Neuropathy      History of coronary artery stent placement  KILEY to mLAD (Jan 2021)      History of cataract surgery      H/O oral surgery        Home Meds:  Home Medications:  acetaminophen 325 mg oral capsule: 1 cap(s) orally every 8 hours, As Needed (10 Mar 2021 12:29)  ALPRAZolam 0.25 mg oral tablet: 1 orally as needed for  anxiety (03 Jun 2023 17:09)  amLODIPine 5 mg oral tablet: 1 tab(s) orally once a day (10 Mar 2021 12:29)  aspirin 81 mg oral delayed release tablet: 1 tab(s) orally once a day (10 Mar 2021 12:29)  Caltrate 600 + D oral tablet: 1 tab(s) orally once a day (10 Mar 2021 12:29)  chlorthalidone 25 mg oral tablet: 1 orally once a day (03 Jun 2023 17:09)  Fish Oil 1000 mg oral capsule: 1 cap(s) orally once a day. Instructed to stop on 3/8/21. (10 Mar 2021 12:29)  gabapentin 300 mg oral capsule: 1 cap(s) orally 3 times a day (10 Mar 2021 12:29)  lisinopril 10 mg oral tablet: 1 orally once a day (03 Jun 2023 17:04)  metoprolol tartrate 25 mg oral tablet: 1 orally once a day (03 Jun 2023 17:09)  Multiple Vitamins oral tablet: 1 tab(s) orally once a day. Instructed to stop on 3/8/21. (10 Mar 2021 12:29)  Osteo Bi-Flex 250 mg-200 mg oral tablet: orally once a day. Instructed to stop on 3/8/21.  (10 Mar 2021 12:29)  oxyBUTYnin 5 mg oral tablet: 1 orally 3 times a day Three times per day Every day at 8:00Am, 12PM, 5PM (03 Jun 2023 17:09)  simvastatin 40 mg oral tablet: 1 orally once a day (03 Jun 2023 17:09)     Allergies  Allergies    Vicodin (Vomiting; Nausea)  codeine (Unknown)    Intolerances       Current Medications:  acetaminophen     Tablet .. 650 milliGRAM(s) Oral every 6 hours  acetaminophen   IVPB .. 1000 milliGRAM(s) IV Intermittent once PRN Mild Pain (1 - 3)  ALPRAZolam 0.25 milliGRAM(s) Oral daily PRN for anxiety  aspirin enteric coated 81 milliGRAM(s) Oral daily  atorvastatin 20 milliGRAM(s) Oral at bedtime  calcium carbonate 1250 mG  + Vitamin D (OsCal 500 + D) 1 Tablet(s) Oral daily  ceFAZolin   IVPB 2000 milliGRAM(s) IV Intermittent every 8 hours  enoxaparin Injectable 40 milliGRAM(s) SubCutaneous every 24 hours  gabapentin 300 milliGRAM(s) Oral three times a day  HYDROmorphone  Injectable 0.5 milliGRAM(s) IV Push every 10 minutes PRN Severe Pain (7 - 10)  HYDROmorphone  Injectable 0.25 milliGRAM(s) IV Push every 4 hours PRN breakthrough pain  ketorolac   Injectable 15 milliGRAM(s) IV Push every 6 hours PRN Moderate Pain (4 - 6)  lactated ringers Bolus 250 milliLiter(s) IV Bolus once  lactated ringers Bolus 500 milliLiter(s) IV Bolus once  magnesium sulfate  IVPB 2 Gram(s) IV Intermittent once  metoprolol tartrate 25 milliGRAM(s) Oral every 12 hours  multivitamin 1 Tablet(s) Oral daily  ondansetron Injectable 4 milliGRAM(s) IV Push every 6 hours PRN Nausea  oxybutynin 5 milliGRAM(s) Oral three times a day  oxyCODONE    IR 5 milliGRAM(s) Oral once PRN Moderate Pain (4 - 6)  senna 2 Tablet(s) Oral at bedtime  sodium chloride 0.9%. 1000 milliLiter(s) (100 mL/Hr) IV Continuous <Continuous>      VITAL SIGNS, INS/OUTS (Last 24Hours)  ICU Vital Signs Last 24 Hrs  T(C): 36.8 (04 Jun 2023 11:45), Max: 36.8 (03 Jun 2023 23:36)  T(F): 98.3 (04 Jun 2023 11:45), Max: 98.3 (04 Jun 2023 11:45)  HR: 65 (04 Jun 2023 15:30) (65 - 78)  BP: 98/45 (04 Jun 2023 15:30) (78/48 - 159/67)  BP(mean): 81 (04 Jun 2023 11:36) (81 - 81)  ABP: 110/43 (04 Jun 2023 15:30) (71/713 - 114/44)  ABP(mean): 67 (04 Jun 2023 15:30) (46 - 71)  RR: 12 (04 Jun 2023 15:30) (12 - 20)  SpO2: 100% (04 Jun 2023 15:30) (97% - 100%)    O2 Parameters below as of 04 Jun 2023 15:30  Patient On (Oxygen Delivery Method): nasal cannula  O2 Flow (L/min): 3        I&O's Summary          Physical Exam:   ----------------------------------------------------------------------------------------------------------  General: Lying in bed quietly  Neuro: A&Ox3, no focal deficits  HEENT: dry oral mucosa  Respiratory: Normal expansion/effort, CTABL  CV: s1s2, RRR, No peripheral edema  GI: Abdomen soft, non-tender, non-distended, bowel sounds appreciated  MSK: Extremities warm, pink, well-perfused. Left hip dressings c/d/i    Height (cm): 172.7 (06-04-23)  Weight (kg): 72.6 (06-04-23)  BMI (kg/m2): 24.3 (06-04-23)  BSA (m2): 1.86 (06-04-23)    Tubes/Lines/Drains   ----------------------------------------------------------------------------------------------------------  [x] Peripheral IV  [x] Urinary Catheter Garrison                                             Date Placed: in PACU 6/4/23   RAFAEL NO   MRN-[id number]  78y ([birth year])F      Admit Date/LOS: 06-03 (1d)  Indication for SDU/SICU: hypotension post-op  OR 6/4/23 (Day #)        ----------------------------------------------------------------------------------------------------------  HPI   78F TIA residual RLE weakness(2014), HTN, HLD, CAD s/p KILEY to mLAD stent (Jan 2021), AS s/p TAVR and CHB s/p PPM (03/2021), DM, OA, compression fx admitted 6/3/23 as a trauma after a mechanical fall onto her left back and femur earlier today, and unable to ambulate afterwards.  Denies head trauma LOC or AC. Xray LLE +left femur fracture. Taken to OR 6/4/23 with Dr Haque for open reduction and internal fixation of left hip using intramedullary hip screw.    OR Stats  OR Time:  12:37 to 13:49 (1 hour 12 min)  EBL: 150cc  IV Fluids: 1000cc  Blood Products: none  UOP: 0cc  Required phenylephrine pushesx2 and brief gtt intra op  Findings: Subtrochanteric fracture Left hip, fracture site puncture through fascia     Extubated successfully in PACU but became hypotensive, transiently responsive to 500cc 5%albumin bolus, 500cc LR bolus, and phenylephrine IVP given by anesthesia. After bolus, SBP 80s MAPs fluctuating between 50-60s, prompting SICU consult. Lactate 2.3 Additional 500cc x2 given. (Total fluid given so far 3L) Post op Hgb 7.3 from 10, PRBC x1 ordered     24 Hour Events  - Admission under SICU service  - NICOM and cardiac enzymes pending  - Repeat Hgb after PRBC      Consults-  Consult Note Adult-Internal Medicine Attending [OTIS Garcia] (06-03-23)  Consult Note Adult-Cardiology Fellow/Attending [D. Khan, S. Behuria] (06-03-23)  Consult Note Adult-Orthopedics Resident/Attending [RENAE Garcia] (06-03-23)        [X] A ten-point review of systems was otherwise negative except as noted above.  [  ] Due to altered mental status/intubation, subjective information was not attained from the patient. History was obtained, to the extent possible, from review of the chart and collateral sources of information.    ----------------------------------------------------------------------------------------------------------      PMH  PAST MEDICAL & SURGICAL HISTORY:  Hypertension      HLD (hyperlipidemia)      CAD (coronary artery disease)      OA (osteoarthritis)      Compression fracture of spine  2015 lumbar      Aortic stenosis      Compression fracture of spine      Type 2 diabetes mellitus  diet controlled      OAB (overactive bladder)      Transient ischemic attack (TIA)  Jan 2014- with residual weakness on right leg      Falls      Neuropathy      History of coronary artery stent placement  KILEY to mLAD (Jan 2021)      History of cataract surgery      H/O oral surgery        Home Meds:  Home Medications:  acetaminophen 325 mg oral capsule: 1 cap(s) orally every 8 hours, As Needed (10 Mar 2021 12:29)  ALPRAZolam 0.25 mg oral tablet: 1 orally as needed for  anxiety (03 Jun 2023 17:09)  amLODIPine 5 mg oral tablet: 1 tab(s) orally once a day (10 Mar 2021 12:29)  aspirin 81 mg oral delayed release tablet: 1 tab(s) orally once a day (10 Mar 2021 12:29)  Caltrate 600 + D oral tablet: 1 tab(s) orally once a day (10 Mar 2021 12:29)  chlorthalidone 25 mg oral tablet: 1 orally once a day (03 Jun 2023 17:09)  Fish Oil 1000 mg oral capsule: 1 cap(s) orally once a day. Instructed to stop on 3/8/21. (10 Mar 2021 12:29)  gabapentin 300 mg oral capsule: 1 cap(s) orally 3 times a day (10 Mar 2021 12:29)  lisinopril 10 mg oral tablet: 1 orally once a day (03 Jun 2023 17:04)  metoprolol tartrate 25 mg oral tablet: 1 orally once a day (03 Jun 2023 17:09)  Multiple Vitamins oral tablet: 1 tab(s) orally once a day. Instructed to stop on 3/8/21. (10 Mar 2021 12:29)  Osteo Bi-Flex 250 mg-200 mg oral tablet: orally once a day. Instructed to stop on 3/8/21.  (10 Mar 2021 12:29)  oxyBUTYnin 5 mg oral tablet: 1 orally 3 times a day Three times per day Every day at 8:00Am, 12PM, 5PM (03 Jun 2023 17:09)  simvastatin 40 mg oral tablet: 1 orally once a day (03 Jun 2023 17:09)     Allergies  Allergies    Vicodin (Vomiting; Nausea)  codeine (Unknown)    Intolerances       Current Medications:  acetaminophen     Tablet .. 650 milliGRAM(s) Oral every 6 hours  acetaminophen   IVPB .. 1000 milliGRAM(s) IV Intermittent once PRN Mild Pain (1 - 3)  ALPRAZolam 0.25 milliGRAM(s) Oral daily PRN for anxiety  aspirin enteric coated 81 milliGRAM(s) Oral daily  atorvastatin 20 milliGRAM(s) Oral at bedtime  calcium carbonate 1250 mG  + Vitamin D (OsCal 500 + D) 1 Tablet(s) Oral daily  ceFAZolin   IVPB 2000 milliGRAM(s) IV Intermittent every 8 hours  enoxaparin Injectable 40 milliGRAM(s) SubCutaneous every 24 hours  gabapentin 300 milliGRAM(s) Oral three times a day  HYDROmorphone  Injectable 0.5 milliGRAM(s) IV Push every 10 minutes PRN Severe Pain (7 - 10)  HYDROmorphone  Injectable 0.25 milliGRAM(s) IV Push every 4 hours PRN breakthrough pain  ketorolac   Injectable 15 milliGRAM(s) IV Push every 6 hours PRN Moderate Pain (4 - 6)  lactated ringers Bolus 250 milliLiter(s) IV Bolus once  lactated ringers Bolus 500 milliLiter(s) IV Bolus once  magnesium sulfate  IVPB 2 Gram(s) IV Intermittent once  metoprolol tartrate 25 milliGRAM(s) Oral every 12 hours  multivitamin 1 Tablet(s) Oral daily  ondansetron Injectable 4 milliGRAM(s) IV Push every 6 hours PRN Nausea  oxybutynin 5 milliGRAM(s) Oral three times a day  oxyCODONE    IR 5 milliGRAM(s) Oral once PRN Moderate Pain (4 - 6)  senna 2 Tablet(s) Oral at bedtime  sodium chloride 0.9%. 1000 milliLiter(s) (100 mL/Hr) IV Continuous <Continuous>      VITAL SIGNS, INS/OUTS (Last 24Hours)  ICU Vital Signs Last 24 Hrs  T(C): 36.8 (04 Jun 2023 11:45), Max: 36.8 (03 Jun 2023 23:36)  T(F): 98.3 (04 Jun 2023 11:45), Max: 98.3 (04 Jun 2023 11:45)  HR: 65 (04 Jun 2023 15:30) (65 - 78)  BP: 98/45 (04 Jun 2023 15:30) (78/48 - 159/67)  BP(mean): 81 (04 Jun 2023 11:36) (81 - 81)  ABP: 110/43 (04 Jun 2023 15:30) (71/713 - 114/44)  ABP(mean): 67 (04 Jun 2023 15:30) (46 - 71)  RR: 12 (04 Jun 2023 15:30) (12 - 20)  SpO2: 100% (04 Jun 2023 15:30) (97% - 100%)    O2 Parameters below as of 04 Jun 2023 15:30  Patient On (Oxygen Delivery Method): nasal cannula  O2 Flow (L/min): 3        I&O's Summary          Physical Exam:   ----------------------------------------------------------------------------------------------------------  General: Lying in bed quietly  Neuro: A&Ox3, no focal deficits  HEENT: dry oral mucosa  Respiratory: Normal expansion/effort, CTABL  CV: s1s2, RRR, No peripheral edema  GI: Abdomen soft, non-tender, non-distended, bowel sounds appreciated  MSK: Extremities warm, pink, well-perfused. Left hip dressings c/d/i    Height (cm): 172.7 (06-04-23)  Weight (kg): 72.6 (06-04-23)  BMI (kg/m2): 24.3 (06-04-23)  BSA (m2): 1.86 (06-04-23)    Tubes/Lines/Drains   ----------------------------------------------------------------------------------------------------------  [x] Peripheral IV  [x] Urinary Catheter Garrison                                             Date Placed: in PACU 6/4/23

## 2023-06-04 NOTE — BRIEF OPERATIVE NOTE - NSICDXBRIEFPROCEDURE_GEN_ALL_CORE_FT
PROCEDURES:  Open reduction and internal fixation of left hip using intramedullary hip screw 04-Jun-2023 14:21:00  Praveen Perez   Detail Level: Detailed Introduction Text (Please End With A Colon): Procedure to be performed at next visit: shave biopsy Instructions (Optional): Site easily traumatized Procedure To Be Performed At Next Visit: Biopsy by shave method Introduction Text (Please End With A Colon): Procedure to be performed at next visit: incision and drainage

## 2023-06-04 NOTE — PRE-ANESTHESIA EVALUATION ADULT - NSANTHADDINFOFT_GEN_ALL_CORE
Discussed with patient her high risk of a cardiac event during the procedure due to her high grade LVOT obstruction.  I explained that there's a chance that chest compressions might be ineffective due to this obstruction.  I also explained the possibility of the need for postop mechanical ventilation as well as the plan to insert a pre-induction arterial line in her radial artery. She states that she understands.

## 2023-06-04 NOTE — CHART NOTE - NSCHARTNOTEFT_GEN_A_CORE
ANESTHESIA to PACU NOTE      ____ Intubated  TV:______       Rate: ______      FiO2: ______    __x__ Patent Airway    _x___ Full return of protective reflexes    ____ Full recovery from anesthesia / sedation to baseline status    Vitals:  HR 74  /37  RR 15  O2sat.  100%  Temp: 36.9C      Mental Status:  _x___ Awake   __x___ Alert   _____ Drowsy   _____ Sedated    Nausea/Vomiting: ____ Yes, See Post - Op Orders      __x__ No    Pain Scale (0-10): _____    Treatment: ___x_ None    ____ See Post - Op/PCA Orders    Post - Operative Fluids:   ____ Oral   _x___ See Post - Op Orders    Plan:  Discharge to:   ____Home       __x___Floor      _____Critical Care    _____ Other:_________________    Comments: s/p general anesthesia with ETT. Extubated to n/c post procedure. No current anesthesia complications. Pt's condition is stable in PACU. Full report is given to PACU RN and SICU (consult).

## 2023-06-04 NOTE — PROGRESS NOTE ADULT - SUBJECTIVE AND OBJECTIVE BOX
GENERAL SURGERY PROGRESS NOTE    Patient: RAFAEL NO , 78y ([birth year])Female   MRN: [id number]  Location: [facility] ED Hold 034 A  Visit: 23 Inpatient  Date: 23 @ 03:10    Events of past 24 hours:  NAEON  Pain manageable.  Patient seen resting in bed comfortably.    PAST MEDICAL & SURGICAL HISTORY:  Hypertension      HLD (hyperlipidemia)      CAD (coronary artery disease)      OA (osteoarthritis)      Compression fracture of spine   lumbar      Aortic stenosis      Compression fracture of spine      Type 2 diabetes mellitus  diet controlled      OAB (overactive bladder)      Transient ischemic attack (TIA)  2014- with residual weakness on right leg      Falls      Neuropathy      History of coronary artery stent placement  KILEY to mLAD (2021)      History of cataract surgery      H/O oral surgery          Vitals:   T(F): 98.2 (23 @ 23:36), Max: 98.7 (23 @ 15:43)  HR: 73 (23 @ 23:36)  BP: 118/56 (23 @ 23:36)  RR: 18 (23 @ 23:36)  SpO2: 98% (23 @ 23:36)      Diet, NPO:   Except Medications      Fluids: sodium chloride 0.9%.: Solution, 1000 milliLiter(s) infuse at 50 mL/Hr    PHYSICAL EXAM:  General: NAD, calm and cooperative.  Cardiac: RRR.  Respiratory: On RA. Speaks in complete sentences. No accessory muscles of respiration in use. Bilateral chest rise.  Abdomen: Soft, non-distended, non-tender, no rebound, no guarding.   Neuro: Motor and sensation intact distally BLE.  Ext: LLE foreshortened, internally rotated.   Vasc: Bilateral DP/PT pulses intact.  Skin: Warm/dry, normal color, no jaundice.      MEDICATIONS  (STANDING):  acetaminophen     Tablet .. 650 milliGRAM(s) Oral every 6 hours  amLODIPine   Tablet 5 milliGRAM(s) Oral daily  aspirin enteric coated 81 milliGRAM(s) Oral daily  atorvastatin 20 milliGRAM(s) Oral at bedtime  calcium carbonate 1250 mG  + Vitamin D (OsCal 500 + D) 1 Tablet(s) Oral daily  enoxaparin Injectable 40 milliGRAM(s) SubCutaneous every 24 hours  gabapentin 300 milliGRAM(s) Oral three times a day  metoprolol tartrate 25 milliGRAM(s) Oral every 12 hours  multivitamin 1 Tablet(s) Oral daily  oxybutynin 5 milliGRAM(s) Oral three times a day  senna 2 Tablet(s) Oral at bedtime  sodium chloride 0.9%. 1000 milliLiter(s) (50 mL/Hr) IV Continuous <Continuous>    MEDICATIONS  (PRN):  ALPRAZolam 0.25 milliGRAM(s) Oral daily PRN for anxiety  HYDROmorphone  Injectable 0.25 milliGRAM(s) IV Push every 4 hours PRN breakthrough pain  ketorolac   Injectable 15 milliGRAM(s) IV Push every 6 hours PRN Moderate Pain (4 - 6)  ondansetron Injectable 4 milliGRAM(s) IV Push every 6 hours PRN Nausea      DVT PROPHYLAXIS: enoxaparin Injectable 40 milliGRAM(s) SubCutaneous every 24 hours    GI PROPHYLAXIS:   ANTICOAGULATION:   ANTIBIOTICS:            LAB/STUDIES:  Labs:  CAPILLARY BLOOD GLUCOSE                              10.0   6.92  )-----------( 229      ( 2023 22:03 )             31.1       Auto Neutrophil %: 75.6 % (23 @ 22:03)  Auto Immature Granulocyte %: 0.4 % (23 @ 22:03)  Auto Neutrophil %: 85.6 % (23 @ 14:33)  Auto Immature Granulocyte %: 0.4 % (23 @ 14:33)        136  |  101  |  38<H>  ----------------------------<  136<H>  4.1   |  23  |  0.9      Calcium, Total Serum: 9.0 mg/dL (23 @ 22:03)      LFTs:             6.4  | 0.4  | 30       ------------------[66      ( 2023 12:27 )  4.0  | x    | 12          Lipase:x      Amylase:x             Coags:     11.00  ----< 0.97    ( 2023 22:03 )     30.8                Urinalysis Basic - ( 2023 22:33 )    Color: Yellow / Appearance: Clear / S.020 / pH: x  Gluc: x / Ketone: Small  / Bili: Negative / Urobili: <2 mg/dL   Blood: x / Protein: 30 mg/dL / Nitrite: Negative   Leuk Esterase: Negative / RBC: 2 /HPF / WBC 3 /HPF   Sq Epi: x / Non Sq Epi: x / Bacteria: Negative                IMAGING:  No new imaging.

## 2023-06-04 NOTE — CONSULT NOTE ADULT - SUBJECTIVE AND OBJECTIVE BOX
Patient is a 78y old  Female who presents with a chief complaint of Left femur fracture (2023 03:10)    HPI: Patient is a 79yo Female with a PMH of HTN, HLD, CAD s/p cardiac cath with KILEY placement seen in  for evaluation of a left hip fracture.  Patient suffered mechanical fall earlier today landing on left hip.  Denies any HT or LOC.  On low dose ASA.  Isolated left hip pain.  Denies any additional injuries or pain.  Ambulates with walker at baseline.  Lives in assisted living facility with .  Does not follow cardiologist.       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 LV Ejection Fraction by Bernardo's Method with a biplane EF of 69 %.   2. Elevated mean left atrial pressure.   3. Spectral Doppler shows impaired relaxation pattern of left   ventricular myocardial filling (Grade I diastolic dysfunction).   4. Degenerative mitral valve.   5. Mild mitral valve regurgitation.   6. Severe thickening and calcification of the posterior mitral valve   leaflet.   7. Severely decreased posterior mitral leaflet mobility.   8. Mild tricuspid regurgitation.   9. Appears to have TAVR peak AV leslie: 1.34 m/s.    < end of copied text >      STRESS  FINDINGS:    CATHETERIZATION  FINDINGS:  < from: Cardiac Cath Lab - Adult (21 @ 10:56) >  PROCEDURE:  --  Left coronary angiography.  --  Right coronary angiography.  --  Intervention on mid LAD: drug-eluting stent.    < end of copied text >      ELECTROPHYSIOLOGY STUDY  FINDINGS:    CAROTID ULTRASOUND:  FINDINGS    VENOUS DUPLEX SCAN:  FINDINGS:    CHEST CT PULMONARY ANGIO with IV Contrast:  FINDINGS:    MEDICATIONS  (STANDING):    MEDICATIONS  (PRN):      FAMILY HISTORY:  FH: type 2 diabetes  grandfather    FH: stroke  mother    FH: heart disease  father, grandfather    SOCIAL HISTORY: No smoking, ETOH or illicit drug use    Past Surgical History: see above    Allergies:  Vicodin (Vomiting; Nausea)  codeine (Unknown)      REVIEW OF SYSTEMS:  CONSTITUTIONAL: No fever, weight loss, chills, shakes, or fatigue  RESPIRATORY: No cough, wheezing, hemoptysis, or shortness of breath  CARDIOVASCULAR: No chest pain, dyspnea, palpitations, dizziness, syncope, paroxysmal nocturnal dyspnea, orthopnea, or arm or leg swelling  GASTROINTESTINAL: No abdominal  or epigastric pain, nausea, vomiting, hematemesis, diarrhea, constipation, melena or bright red blood.  NEUROLOGICAL: No headaches, memory loss, slurred speech, limb weakness, loss of strength, numbness, or tremors  MUSCULOSKELETAL: +L hip pain      Vital Signs Last 24 Hrs  T(C): 36.8 (2023 11:45), Max: 37.1 (2023 15:43)  T(F): 98.3 (2023 11:45), Max: 98.7 (2023 15:43)  HR: 69 (2023 11:45) (69 - 82)  BP: 136/61 (2023 11:45) (118/56 - 159/67)  BP(mean): 81 (2023 11:36) (81 - 81)  RR: 15 (2023 11:45) (15 - 18)  SpO2: 98% (2023 11:45) (97% - 100%)    Parameters below as of 2023 11:45  Patient On (Oxygen Delivery Method): room air        PHYSICAL EXAM:  GENERAL: In no apparent distress, well nourished, and hydrated.  NECK: Supple, No JVD   HEART: Regular rate and rhythm; No murmurs, rubs, or gallops.  PULMONARY: Clear to auscultation and perfusion.  No rales, wheezing, or rhonchi bilaterally.  EXTREMITIES:  2+ Peripheral Pulses, no LE edema BL  NEUROLOGICAL: Grossly nonfocal      INTERPRETATION OF TELEMETRY: SR 75 bpm    ECG:  < from: 12 Lead ECG (21 @ 08:43) >    Ventricular Rate 92 BPM    Atrial Rate 92 BPM    P-R Interval 150 ms    QRS Duration 128 ms    Q-T Interval 410 ms    QTC Calculation(Bazett) 507 ms    P Axis 46 degrees    R Axis -4 degrees    T Axis 168 degrees    Diagnosis Line NORMAL SINUS RHYTHM  Atrial-sensed ventricular-paced rhythm  ABNORMAL ECG  WHEN COMPARED WITH ECG OF 12-MAR-2021 08:42, (UNCONFIRMED)  SIGNIFICANT CHANGES HAVE OCCURRED  Confirmed by MD Ronel, [facility] ([id number]) on 3/13/2021 1:53:37 PM    < end of copied text >      I&O's Detail      LABS:                        10.0   6.92  )-----------( 229      ( 2023 22:03 )             31.1     06-03    136  |  101  |  38<H>  ----------------------------<  136<H>  4.1   |  23  |  0.9    Ca    9.0      2023 22:03  Phos  4.2     06-03  Mg     1.7     06-03    TPro  6.4  /  Alb  4.0  /  TBili  0.4  /  DBili  x   /  AST  30  /  ALT  12  /  AlkPhos  66  06-03        PT/INR - ( 2023 22:03 )   PT: 11.00 sec;   INR: 0.97 ratio         PTT - ( 2023 22:03 )  PTT:30.8 sec  Urinalysis Basic - ( 2023 22:33 )    Color: Yellow / Appearance: Clear / S.020 / pH: x  Gluc: x / Ketone: Small  / Bili: Negative / Urobili: <2 mg/dL   Blood: x / Protein: 30 mg/dL / Nitrite: Negative   Leuk Esterase: Negative / RBC: 2 /HPF / WBC 3 /HPF   Sq Epi: x / Non Sq Epi: x / Bacteria: Negative      BNP  I&O's Detail    Daily Height in cm: 172.72 (2023 11:36)    Daily     RADIOLOGY & ADDITIONAL STUDIES: Patient is a 78y old  Female who presents with a chief complaint of Left femur fracture (2023 03:10)    HPI: Patient is a 79yo Female with a PMH of HTN, HLD, CAD s/p cardiac cath with KILEY placement seen in  for evaluation of a left hip fracture.  Patient suffered mechanical fall earlier today landing on left hip.  Denies any HT or LOC.  On low dose ASA.  Isolated left hip pain.  Denies any additional injuries or pain.  Ambulates with walker at baseline.  Lives in assisted living facility with .  Does not follow cardiologist.       PAST MEDICAL & SURGICAL HISTORY:  Hypertension  HLD (hyperlipidemia)  CAD (coronary artery disease)  OA (osteoarthritis)  Compression fracture of spine   lumbar  Aortic stenosis  Compression fracture of spine  Type 2 diabetes mellitus  diet controlled  OAB (overactive bladder)  Transient ischemic attack (TIA)  2014- with residual weakness on right leg      Falls      Neuropathy      History of coronary artery stent placement  KILEY to mLAD (2021)      History of cataract surgery      H/O oral surgery      PREVIOUS DIAGNOSTIC TESTING:      ECHO  FINDINGS:  < from: TTE Echo Complete w/o Contrast w/ Doppler (23 @ 09:47) >  Summary:   1. LV Ejection Fraction by Bernardo's Method with a biplane EF of 69 %.   2. Elevated mean left atrial pressure.   3. Spectral Doppler shows impaired relaxation pattern of left   ventricular myocardial filling (Grade I diastolic dysfunction).   4. Degenerative mitral valve.   5. Mild mitral valve regurgitation.   6. Severe thickening and calcification of the posterior mitral valve   leaflet.   7. Severely decreased posterior mitral leaflet mobility.   8. Mild tricuspid regurgitation.   9. Appears to have TAVR peak AV leslie: 1.34 m/s.    < end of copied text >      STRESS  FINDINGS:    CATHETERIZATION  FINDINGS:  < from: Cardiac Cath Lab - Adult (21 @ 10:56) >  PROCEDURE:  --  Left coronary angiography.  --  Right coronary angiography.  --  Intervention on mid LAD: drug-eluting stent.    < end of copied text >      ELECTROPHYSIOLOGY STUDY  FINDINGS:    CAROTID ULTRASOUND:  FINDINGS    VENOUS DUPLEX SCAN:  FINDINGS:    CHEST CT PULMONARY ANGIO with IV Contrast:  FINDINGS:    MEDICATIONS  (STANDING):    MEDICATIONS  (PRN):      FAMILY HISTORY:  FH: type 2 diabetes  grandfather    FH: stroke  mother    FH: heart disease  father, grandfather    SOCIAL HISTORY: No smoking, ETOH or illicit drug use    Past Surgical History: see above    Allergies:  Vicodin (Vomiting; Nausea)  codeine (Unknown)      REVIEW OF SYSTEMS:  CONSTITUTIONAL: No fever, weight loss, chills, shakes, or fatigue  RESPIRATORY: No cough, wheezing, hemoptysis, or shortness of breath  CARDIOVASCULAR: No chest pain, dyspnea, palpitations, dizziness, syncope, paroxysmal nocturnal dyspnea, orthopnea, or arm or leg swelling  GASTROINTESTINAL: No abdominal  or epigastric pain, nausea, vomiting, hematemesis, diarrhea, constipation, melena or bright red blood.  NEUROLOGICAL: No headaches, memory loss, slurred speech, limb weakness, loss of strength, numbness, or tremors  MUSCULOSKELETAL: +L hip pain      Vital Signs Last 24 Hrs  T(C): 36.8 (2023 11:45), Max: 37.1 (2023 15:43)  T(F): 98.3 (2023 11:45), Max: 98.7 (2023 15:43)  HR: 69 (2023 11:45) (69 - 82)  BP: 136/61 (2023 11:45) (118/56 - 159/67)  BP(mean): 81 (2023 11:36) (81 - 81)  RR: 15 (2023 11:45) (15 - 18)  SpO2: 98% (2023 11:45) (97% - 100%)    Parameters below as of 2023 11:45  Patient On (Oxygen Delivery Method): room air        PHYSICAL EXAM:  GENERAL: In no apparent distress, well nourished, and hydrated.  NECK: Supple, No JVD   HEART: Regular rate and rhythm; No murmurs, rubs, or gallops.  PULMONARY: Clear to auscultation and perfusion.  No rales, wheezing, or rhonchi bilaterally.  EXTREMITIES:  2+ Peripheral Pulses, no LE edema BL  NEUROLOGICAL: Grossly nonfocal      INTERPRETATION OF TELEMETRY: SR 75 bpm    ECG:  < from: 12 Lead ECG (21 @ 08:43) >    Ventricular Rate 92 BPM    Atrial Rate 92 BPM    P-R Interval 150 ms    QRS Duration 128 ms    Q-T Interval 410 ms    QTC Calculation(Bazett) 507 ms    P Axis 46 degrees    R Axis -4 degrees    T Axis 168 degrees    Diagnosis Line NORMAL SINUS RHYTHM  Atrial-sensed ventricular-paced rhythm  ABNORMAL ECG  WHEN COMPARED WITH ECG OF 12-MAR-2021 08:42, (UNCONFIRMED)  SIGNIFICANT CHANGES HAVE OCCURRED  Confirmed by MD Ronel, [facility] ([id number]) on 3/13/2021 1:53:37 PM    < end of copied text >      I&O's Detail      LABS:                        10.0   6.92  )-----------( 229      ( 2023 22:03 )             31.1     06-03    136  |  101  |  38<H>  ----------------------------<  136<H>  4.1   |  23  |  0.9    Ca    9.0      2023 22:03  Phos  4.2     06-03  Mg     1.7     06-03    TPro  6.4  /  Alb  4.0  /  TBili  0.4  /  DBili  x   /  AST  30  /  ALT  12  /  AlkPhos  66  06-03        PT/INR - ( 2023 22:03 )   PT: 11.00 sec;   INR: 0.97 ratio         PTT - ( 2023 22:03 )  PTT:30.8 sec  Urinalysis Basic - ( 2023 22:33 )    Color: Yellow / Appearance: Clear / S.020 / pH: x  Gluc: x / Ketone: Small  / Bili: Negative / Urobili: <2 mg/dL   Blood: x / Protein: 30 mg/dL / Nitrite: Negative   Leuk Esterase: Negative / RBC: 2 /HPF / WBC 3 /HPF   Sq Epi: x / Non Sq Epi: x / Bacteria: Negative      BNP  I&O's Detail    Daily Height in cm: 172.72 (2023 11:36)    Daily     RADIOLOGY & ADDITIONAL STUDIES:

## 2023-06-04 NOTE — PRE-OP CHECKLIST - AICD PRESENT
yes [employer] PPM as per patient; anesthesia & OR RN made aware patient reports interrogation performed this morning but no EPS note in chart/yes

## 2023-06-04 NOTE — PHYSICAL THERAPY INITIAL EVALUATION ADULT - SPECIFY REASON(S)
Hold PT pending surgery 6/4 for left femur fracture, MELLISSA Connor aware. PT to f/u when appropriate.

## 2023-06-04 NOTE — CONSULT NOTE ADULT - NS ATTEND AMEND GEN_ALL_CORE FT
Agree with plan as above    Rec ischemic work up with cardiology  Monitor pt on tele  Monitor lytes and keep K>4 and Mg>2  Outpatient follow up in device clinic

## 2023-06-04 NOTE — PROGRESS NOTE ADULT - SUBJECTIVE AND OBJECTIVE BOX
Patient is a 78y old  Female who presents with a chief complaint of Left femur fracture (2023 12:30)      OVERNIGHT EVENTS: remained stable, reported right hip pain    SUBJECTIVE / INTERVAL HPI: Patient seen and examined at bedside.     VITAL SIGNS:  Vital Signs Last 24 Hrs  T(C): 36.8 (2023 11:45), Max: 37.1 (2023 15:43)  T(F): 98.3 (2023 11:45), Max: 98.7 (2023 15:43)  HR: 69 (2023 11:45) (69 - 82)  BP: 136/61 (2023 11:45) (118/56 - 159/67)  BP(mean): 81 (2023 11:36) (81 - 81)  RR: 15 (2023 11:45) (15 - 18)  SpO2: 98% (2023 11:45) (97% - 100%)    Parameters below as of 2023 11:45  Patient On (Oxygen Delivery Method): room air        PHYSICAL EXAM:    General: old opbese lady chronically look ill   HEENT: NC/AT; PERRL, clear conjunctiva  Neck: supple  Cardiovascular: +S1/S2; RRR  Respiratory: CTA b/l; no W/R/R  Gastrointestinal: soft, NT/ND; +BSx4  Extremities: WWP; 2+ peripheral pulses; no edema   Neurological: AAOx3; no focal deficits    MEDICATIONS:  MEDICATIONS  (STANDING):  acetaminophen     Tablet .. 650 milliGRAM(s) Oral every 6 hours  aspirin enteric coated 81 milliGRAM(s) Oral daily  atorvastatin 20 milliGRAM(s) Oral at bedtime  calcium carbonate 1250 mG  + Vitamin D (OsCal 500 + D) 1 Tablet(s) Oral daily  ceFAZolin   IVPB 2000 milliGRAM(s) IV Intermittent every 8 hours  enoxaparin Injectable 40 milliGRAM(s) SubCutaneous every 24 hours  gabapentin 300 milliGRAM(s) Oral three times a day  lactated ringers. 1000 milliLiter(s) (150 mL/Hr) IV Continuous <Continuous>  metoprolol tartrate 25 milliGRAM(s) Oral every 12 hours  multivitamin 1 Tablet(s) Oral daily  oxybutynin 5 milliGRAM(s) Oral three times a day  senna 2 Tablet(s) Oral at bedtime    MEDICATIONS  (PRN):  acetaminophen   IVPB .. 1000 milliGRAM(s) IV Intermittent once PRN Mild Pain (1 - 3)  ALPRAZolam 0.25 milliGRAM(s) Oral daily PRN for anxiety  HYDROmorphone  Injectable 0.5 milliGRAM(s) IV Push every 10 minutes PRN Severe Pain (7 - 10)  HYDROmorphone  Injectable 0.25 milliGRAM(s) IV Push every 4 hours PRN breakthrough pain  ketorolac   Injectable 15 milliGRAM(s) IV Push every 6 hours PRN Moderate Pain (4 - 6)  ondansetron Injectable 4 milliGRAM(s) IV Push every 6 hours PRN Nausea  oxyCODONE    IR 5 milliGRAM(s) Oral once PRN Moderate Pain (4 - 6)      ALLERGIES:  Allergies    Vicodin (Vomiting; Nausea)  codeine (Unknown)    Intolerances        LABS:                        10.0   6.92  )-----------( 229      ( 2023 22:03 )             31.1     06-03    136  |  101  |  38<H>  ----------------------------<  136<H>  4.1   |  23  |  0.9    Ca    9.0      2023 22:03  Phos  4.2     06-03  Mg     1.7     06-03    TPro  6.4  /  Alb  4.0  /  TBili  0.4  /  DBili  x   /  AST  30  /  ALT  12  /  AlkPhos  66  06-03    PT/INR - ( 2023 22:03 )   PT: 11.00 sec;   INR: 0.97 ratio         PTT - ( 2023 22:03 )  PTT:30.8 sec  Urinalysis Basic - ( 2023 22:33 )    Color: Yellow / Appearance: Clear / S.020 / pH: x  Gluc: x / Ketone: Small  / Bili: Negative / Urobili: <2 mg/dL   Blood: x / Protein: 30 mg/dL / Nitrite: Negative   Leuk Esterase: Negative / RBC: 2 /HPF / WBC 3 /HPF   Sq Epi: x / Non Sq Epi: x / Bacteria: Negative      CAPILLARY BLOOD GLUCOSE      POCT Blood Glucose.: 223 mg/dL (2023 14:06)      RADIOLOGY & ADDITIONAL TESTS: Reviewed.    ASSESSMENT:    PLAN:

## 2023-06-05 DIAGNOSIS — Z86.79 PERSONAL HISTORY OF OTHER DISEASES OF THE CIRCULATORY SYSTEM: Chronic | ICD-10-CM

## 2023-06-05 LAB
ANION GAP SERPL CALC-SCNC: 9 MMOL/L — SIGNIFICANT CHANGE UP (ref 7–14)
BASOPHILS # BLD AUTO: 0 K/UL — SIGNIFICANT CHANGE UP (ref 0–0.2)
BASOPHILS # BLD AUTO: 0 K/UL — SIGNIFICANT CHANGE UP (ref 0–0.2)
BASOPHILS # BLD AUTO: 0.01 K/UL — SIGNIFICANT CHANGE UP (ref 0–0.2)
BASOPHILS NFR BLD AUTO: 0 % — SIGNIFICANT CHANGE UP (ref 0–1)
BASOPHILS NFR BLD AUTO: 0 % — SIGNIFICANT CHANGE UP (ref 0–1)
BASOPHILS NFR BLD AUTO: 0.2 % — SIGNIFICANT CHANGE UP (ref 0–1)
BUN SERPL-MCNC: 31 MG/DL — HIGH (ref 10–20)
CALCIUM SERPL-MCNC: 7.3 MG/DL — LOW (ref 8.4–10.5)
CHLORIDE SERPL-SCNC: 108 MMOL/L — SIGNIFICANT CHANGE UP (ref 98–110)
CO2 SERPL-SCNC: 19 MMOL/L — SIGNIFICANT CHANGE UP (ref 17–32)
CREAT SERPL-MCNC: 0.9 MG/DL — SIGNIFICANT CHANGE UP (ref 0.7–1.5)
EGFR: 65 ML/MIN/1.73M2 — SIGNIFICANT CHANGE UP
EOSINOPHIL # BLD AUTO: 0 K/UL — SIGNIFICANT CHANGE UP (ref 0–0.7)
EOSINOPHIL # BLD AUTO: 0 K/UL — SIGNIFICANT CHANGE UP (ref 0–0.7)
EOSINOPHIL # BLD AUTO: 0.04 K/UL — SIGNIFICANT CHANGE UP (ref 0–0.7)
EOSINOPHIL NFR BLD AUTO: 0 % — SIGNIFICANT CHANGE UP (ref 0–8)
EOSINOPHIL NFR BLD AUTO: 0 % — SIGNIFICANT CHANGE UP (ref 0–8)
EOSINOPHIL NFR BLD AUTO: 0.7 % — SIGNIFICANT CHANGE UP (ref 0–8)
GLUCOSE BLDC GLUCOMTR-MCNC: 136 MG/DL — HIGH (ref 70–99)
GLUCOSE BLDC GLUCOMTR-MCNC: 179 MG/DL — HIGH (ref 70–99)
GLUCOSE BLDC GLUCOMTR-MCNC: 189 MG/DL — HIGH (ref 70–99)
GLUCOSE BLDC GLUCOMTR-MCNC: 189 MG/DL — HIGH (ref 70–99)
GLUCOSE BLDC GLUCOMTR-MCNC: 193 MG/DL — HIGH (ref 70–99)
GLUCOSE SERPL-MCNC: 124 MG/DL — HIGH (ref 70–99)
HCT VFR BLD CALC: 19.4 % — LOW (ref 37–47)
HCT VFR BLD CALC: 19.8 % — LOW (ref 37–47)
HCT VFR BLD CALC: 23.2 % — LOW (ref 37–47)
HCT VFR BLD CALC: 23.3 % — LOW (ref 37–47)
HGB BLD-MCNC: 6.6 G/DL — CRITICAL LOW (ref 12–16)
HGB BLD-MCNC: 7 G/DL — LOW (ref 12–16)
HGB BLD-MCNC: 7.8 G/DL — LOW (ref 12–16)
HGB BLD-MCNC: 8.1 G/DL — LOW (ref 12–16)
IMM GRANULOCYTES NFR BLD AUTO: 0.3 % — SIGNIFICANT CHANGE UP (ref 0.1–0.3)
IMM GRANULOCYTES NFR BLD AUTO: 0.7 % — HIGH (ref 0.1–0.3)
LYMPHOCYTES # BLD AUTO: 0.83 K/UL — LOW (ref 1.2–3.4)
LYMPHOCYTES # BLD AUTO: 1.01 K/UL — LOW (ref 1.2–3.4)
LYMPHOCYTES # BLD AUTO: 1.41 K/UL — SIGNIFICANT CHANGE UP (ref 1.2–3.4)
LYMPHOCYTES # BLD AUTO: 11.6 % — LOW (ref 20.5–51.1)
LYMPHOCYTES # BLD AUTO: 17.1 % — LOW (ref 20.5–51.1)
LYMPHOCYTES # BLD AUTO: 21.2 % — SIGNIFICANT CHANGE UP (ref 20.5–51.1)
MAGNESIUM SERPL-MCNC: 2 MG/DL — SIGNIFICANT CHANGE UP (ref 1.8–2.4)
MCHC RBC-ENTMCNC: 29.7 PG — SIGNIFICANT CHANGE UP (ref 27–31)
MCHC RBC-ENTMCNC: 30.1 PG — SIGNIFICANT CHANGE UP (ref 27–31)
MCHC RBC-ENTMCNC: 30.1 PG — SIGNIFICANT CHANGE UP (ref 27–31)
MCHC RBC-ENTMCNC: 30.4 PG — SIGNIFICANT CHANGE UP (ref 27–31)
MCHC RBC-ENTMCNC: 33.5 G/DL — SIGNIFICANT CHANGE UP (ref 32–37)
MCHC RBC-ENTMCNC: 34 G/DL — SIGNIFICANT CHANGE UP (ref 32–37)
MCHC RBC-ENTMCNC: 34.9 G/DL — SIGNIFICANT CHANGE UP (ref 32–37)
MCHC RBC-ENTMCNC: 35 G/DL — SIGNIFICANT CHANGE UP (ref 32–37)
MCV RBC AUTO: 86 FL — SIGNIFICANT CHANGE UP (ref 81–99)
MCV RBC AUTO: 86.2 FL — SIGNIFICANT CHANGE UP (ref 81–99)
MCV RBC AUTO: 88.6 FL — SIGNIFICANT CHANGE UP (ref 81–99)
MCV RBC AUTO: 89.4 FL — SIGNIFICANT CHANGE UP (ref 81–99)
MONOCYTES # BLD AUTO: 0.7 K/UL — HIGH (ref 0.1–0.6)
MONOCYTES # BLD AUTO: 0.84 K/UL — HIGH (ref 0.1–0.6)
MONOCYTES # BLD AUTO: 0.87 K/UL — HIGH (ref 0.1–0.6)
MONOCYTES NFR BLD AUTO: 10.6 % — HIGH (ref 1.7–9.3)
MONOCYTES NFR BLD AUTO: 11.7 % — HIGH (ref 1.7–9.3)
MONOCYTES NFR BLD AUTO: 14.8 % — HIGH (ref 1.7–9.3)
NEUTROPHILS # BLD AUTO: 3.92 K/UL — SIGNIFICANT CHANGE UP (ref 1.4–6.5)
NEUTROPHILS # BLD AUTO: 4.46 K/UL — SIGNIFICANT CHANGE UP (ref 1.4–6.5)
NEUTROPHILS # BLD AUTO: 5.46 K/UL — SIGNIFICANT CHANGE UP (ref 1.4–6.5)
NEUTROPHILS NFR BLD AUTO: 66.5 % — SIGNIFICANT CHANGE UP (ref 42.2–75.2)
NEUTROPHILS NFR BLD AUTO: 67 % — SIGNIFICANT CHANGE UP (ref 42.2–75.2)
NEUTROPHILS NFR BLD AUTO: 76.4 % — HIGH (ref 42.2–75.2)
NRBC # BLD: 0 /100 WBCS — SIGNIFICANT CHANGE UP (ref 0–0)
PHOSPHATE SERPL-MCNC: 1.8 MG/DL — LOW (ref 2.1–4.9)
PLATELET # BLD AUTO: 114 K/UL — LOW (ref 130–400)
PLATELET # BLD AUTO: 114 K/UL — LOW (ref 130–400)
PLATELET # BLD AUTO: 123 K/UL — LOW (ref 130–400)
PLATELET # BLD AUTO: 134 K/UL — SIGNIFICANT CHANGE UP (ref 130–400)
PMV BLD: 10.2 FL — SIGNIFICANT CHANGE UP (ref 7.4–10.4)
PMV BLD: 10.3 FL — SIGNIFICANT CHANGE UP (ref 7.4–10.4)
PMV BLD: 10.5 FL — HIGH (ref 7.4–10.4)
PMV BLD: 10.5 FL — HIGH (ref 7.4–10.4)
POTASSIUM SERPL-MCNC: 4.2 MMOL/L — SIGNIFICANT CHANGE UP (ref 3.5–5)
POTASSIUM SERPL-SCNC: 4.2 MMOL/L — SIGNIFICANT CHANGE UP (ref 3.5–5)
RBC # BLD: 2.17 M/UL — LOW (ref 4.2–5.4)
RBC # BLD: 2.29 M/UL — LOW (ref 4.2–5.4)
RBC # BLD: 2.63 M/UL — LOW (ref 4.2–5.4)
RBC # BLD: 2.69 M/UL — LOW (ref 4.2–5.4)
RBC # FLD: 13.7 % — SIGNIFICANT CHANGE UP (ref 11.5–14.5)
RBC # FLD: 14.9 % — HIGH (ref 11.5–14.5)
RBC # FLD: 16.5 % — HIGH (ref 11.5–14.5)
RBC # FLD: 16.9 % — HIGH (ref 11.5–14.5)
SODIUM SERPL-SCNC: 136 MMOL/L — SIGNIFICANT CHANGE UP (ref 135–146)
WBC # BLD: 5.89 K/UL — SIGNIFICANT CHANGE UP (ref 4.8–10.8)
WBC # BLD: 6.63 K/UL — SIGNIFICANT CHANGE UP (ref 4.8–10.8)
WBC # BLD: 6.65 K/UL — SIGNIFICANT CHANGE UP (ref 4.8–10.8)
WBC # BLD: 7.15 K/UL — SIGNIFICANT CHANGE UP (ref 4.8–10.8)
WBC # FLD AUTO: 5.89 K/UL — SIGNIFICANT CHANGE UP (ref 4.8–10.8)
WBC # FLD AUTO: 6.63 K/UL — SIGNIFICANT CHANGE UP (ref 4.8–10.8)
WBC # FLD AUTO: 6.65 K/UL — SIGNIFICANT CHANGE UP (ref 4.8–10.8)
WBC # FLD AUTO: 7.15 K/UL — SIGNIFICANT CHANGE UP (ref 4.8–10.8)

## 2023-06-05 PROCEDURE — 99291 CRITICAL CARE FIRST HOUR: CPT | Mod: 25,24

## 2023-06-05 PROCEDURE — 71045 X-RAY EXAM CHEST 1 VIEW: CPT | Mod: 26

## 2023-06-05 RX ORDER — INSULIN LISPRO 100/ML
VIAL (ML) SUBCUTANEOUS
Refills: 0 | Status: DISCONTINUED | OUTPATIENT
Start: 2023-06-05 | End: 2023-06-14

## 2023-06-05 RX ORDER — SODIUM CHLORIDE 9 MG/ML
250 INJECTION INTRAMUSCULAR; INTRAVENOUS; SUBCUTANEOUS ONCE
Refills: 0 | Status: COMPLETED | OUTPATIENT
Start: 2023-06-05 | End: 2023-06-05

## 2023-06-05 RX ORDER — SODIUM CHLORIDE 9 MG/ML
1000 INJECTION, SOLUTION INTRAVENOUS
Refills: 0 | Status: DISCONTINUED | OUTPATIENT
Start: 2023-06-05 | End: 2023-06-06

## 2023-06-05 RX ORDER — HEPARIN SODIUM 5000 [USP'U]/ML
5000 INJECTION INTRAVENOUS; SUBCUTANEOUS EVERY 8 HOURS
Refills: 0 | Status: DISCONTINUED | OUTPATIENT
Start: 2023-06-05 | End: 2023-06-05

## 2023-06-05 RX ORDER — PANTOPRAZOLE SODIUM 20 MG/1
40 TABLET, DELAYED RELEASE ORAL
Refills: 0 | Status: DISCONTINUED | OUTPATIENT
Start: 2023-06-05 | End: 2023-06-05

## 2023-06-05 RX ORDER — KETOROLAC TROMETHAMINE 30 MG/ML
15 SYRINGE (ML) INJECTION EVERY 6 HOURS
Refills: 0 | Status: DISCONTINUED | OUTPATIENT
Start: 2023-06-05 | End: 2023-06-08

## 2023-06-05 RX ORDER — AMLODIPINE BESYLATE 2.5 MG/1
1 TABLET ORAL
Qty: 0 | Refills: 0 | DISCHARGE

## 2023-06-05 RX ORDER — GABAPENTIN 400 MG/1
300 CAPSULE ORAL EVERY 8 HOURS
Refills: 0 | Status: DISCONTINUED | OUTPATIENT
Start: 2023-06-05 | End: 2023-06-10

## 2023-06-05 RX ORDER — ENOXAPARIN SODIUM 100 MG/ML
40 INJECTION SUBCUTANEOUS EVERY 24 HOURS
Refills: 0 | Status: DISCONTINUED | OUTPATIENT
Start: 2023-06-05 | End: 2023-06-14

## 2023-06-05 RX ORDER — ALPRAZOLAM 0.25 MG
1 TABLET ORAL
Refills: 0 | DISCHARGE

## 2023-06-05 RX ADMIN — Medication 650 MILLIGRAM(S): at 00:07

## 2023-06-05 RX ADMIN — Medication 15 MILLIGRAM(S): at 20:20

## 2023-06-05 RX ADMIN — Medication 650 MILLIGRAM(S): at 11:00

## 2023-06-05 RX ADMIN — Medication 15 MILLIGRAM(S): at 20:30

## 2023-06-05 RX ADMIN — GABAPENTIN 300 MILLIGRAM(S): 400 CAPSULE ORAL at 21:39

## 2023-06-05 RX ADMIN — Medication 2: at 16:28

## 2023-06-05 RX ADMIN — Medication 100 MILLIGRAM(S): at 05:38

## 2023-06-05 RX ADMIN — Medication 1 TABLET(S): at 11:01

## 2023-06-05 RX ADMIN — SODIUM CHLORIDE 3000 MILLILITER(S): 9 INJECTION INTRAMUSCULAR; INTRAVENOUS; SUBCUTANEOUS at 16:28

## 2023-06-05 RX ADMIN — HYDROMORPHONE HYDROCHLORIDE 0.25 MILLIGRAM(S): 2 INJECTION INTRAMUSCULAR; INTRAVENOUS; SUBCUTANEOUS at 00:07

## 2023-06-05 RX ADMIN — SODIUM CHLORIDE 75 MILLILITER(S): 9 INJECTION, SOLUTION INTRAVENOUS at 21:41

## 2023-06-05 RX ADMIN — Medication 1: at 11:14

## 2023-06-05 RX ADMIN — Medication 650 MILLIGRAM(S): at 17:31

## 2023-06-05 RX ADMIN — ATORVASTATIN CALCIUM 20 MILLIGRAM(S): 80 TABLET, FILM COATED ORAL at 21:35

## 2023-06-05 RX ADMIN — ENOXAPARIN SODIUM 40 MILLIGRAM(S): 100 INJECTION SUBCUTANEOUS at 11:05

## 2023-06-05 RX ADMIN — Medication 1: at 06:59

## 2023-06-05 RX ADMIN — GABAPENTIN 300 MILLIGRAM(S): 400 CAPSULE ORAL at 14:34

## 2023-06-05 RX ADMIN — Medication 100 MILLIGRAM(S): at 14:35

## 2023-06-05 RX ADMIN — Medication 15 MILLIGRAM(S): at 11:00

## 2023-06-05 RX ADMIN — Medication 650 MILLIGRAM(S): at 06:15

## 2023-06-05 RX ADMIN — PANTOPRAZOLE SODIUM 40 MILLIGRAM(S): 20 TABLET, DELAYED RELEASE ORAL at 06:16

## 2023-06-05 RX ADMIN — HYDROMORPHONE HYDROCHLORIDE 0.25 MILLIGRAM(S): 2 INJECTION INTRAMUSCULAR; INTRAVENOUS; SUBCUTANEOUS at 04:55

## 2023-06-05 RX ADMIN — GABAPENTIN 300 MILLIGRAM(S): 400 CAPSULE ORAL at 05:50

## 2023-06-05 RX ADMIN — SENNA PLUS 2 TABLET(S): 8.6 TABLET ORAL at 21:41

## 2023-06-05 NOTE — PROGRESS NOTE ADULT - SUBJECTIVE AND OBJECTIVE BOX
RAFAEL NO   MRN-[id number]  78y ([birth year])F      Admit Date/LOS: 06-03 (1d)  Indication for SDU/SICU: hypotension post-op  OR 6/4/23 (Day #)        ----------------------------------------------------------------------------------------------------------  HPI   78F TIA residual RLE weakness(2014), HTN, HLD, CAD s/p KILEY to mLAD stent (Jan 2021), AS s/p TAVR, DM, OA, compression fx admitted 6/3/23 as a trauma after a mechanical fall onto her left back and femur earlier today, and unable to ambulate afterwards.  Denies head trauma LOC or AC. Xray LLE +left femur fracture. Taken to OR 6/4/23 with Dr Haque for open reduction and internal fixation of left hip using intramedullary hip screw.    OR Stats  OR Time:  12:37 to 13:49 (1 hour 12 min)  EBL: 150cc  IV Fluids: 1000cc  Blood Products: none  UOP: 0cc  Required phenylephrine pushesx2 and brief gtt intra op  Findings: Subtrochanteric fracture Left hip, fracture site puncture through fascia     Extubated successfully in PACU but became hypotensive, transiently responsive to 500cc 5%albumin bolus, 500cc LR bolus, and phenylephrine IVP given by anesthesia. After bolus, SBP 80s MAPs fluctuating between 50-60s, prompting SICU consult. Lactate 2.3 Additional 500cc x2 given. (Total fluid given so far 3L) Post op Hgb 7.3 from 10, PRBC x1 ordered     24 Hour Events  6/4  NIGHT  -PM Rounds: Levo @ 0.04. left thigh soft; LLE sensation and motor intact; palpable DP/PT   -NICOM 22%, 500cc bolus given  -PM Lopressor held for low SBP   -Hgb 7.3 -> 7.0 --> stat cbc, 6.6 -> 1u PRBC  -Lactate 1.8 - cleared   -No repletions  -UOP 75cc/hr   -CLD     Day  2 g Mg repleted  hypotensive to 80/30s & hgb 7.3--> 1 u PRBC  Ttotal IVF: 3 L     -1 L intra op     -Received 1 push of apple, 500cc of albumin, 500 LR in PACU prior to consult    -500x2 by us  Levo 0.03   Lac 2.3 --> f/u 8 pm lactate   NICOM 22% positive   Holding ASA and Lovenox, possibly restart after post transfusion cbc    cc--> 14 Persian murcia insertion  trop neg        [X] A ten-point review of systems was otherwise negative except as noted above.  [  ] Due to altered mental status/intubation, subjective information was not attained from the patient. History was obtained, to the extent possible, from review of the chart and collateral sources of information.    ----------------------------------------------------------------------------------------------------------   RAFAEL NO   MRN-[id number]  78y ([birth year])F      Admit Date/LOS: 06-03 (1d)  Indication for SDU/SICU: hypotension post-op  OR 6/4/23 (Day #)        ----------------------------------------------------------------------------------------------------------  HPI   78F TIA residual RLE weakness(2014), HTN, HLD, CAD s/p KILEY to mLAD stent (Jan 2021), AS s/p TAVR, DM, OA, compression fx admitted 6/3/23 as a trauma after a mechanical fall onto her left back and femur earlier today, and unable to ambulate afterwards.  Denies head trauma LOC or AC. Xray LLE +left femur fracture. Taken to OR 6/4/23 with Dr Haque for open reduction and internal fixation of left hip using intramedullary hip screw.    OR Stats  OR Time:  12:37 to 13:49 (1 hour 12 min)  EBL: 150cc  IV Fluids: 1000cc  Blood Products: none  UOP: 0cc  Required phenylephrine pushesx2 and brief gtt intra op  Findings: Subtrochanteric fracture Left hip, fracture site puncture through fascia     Extubated successfully in PACU but became hypotensive, transiently responsive to 500cc 5%albumin bolus, 500cc LR bolus, and phenylephrine IVP given by anesthesia. After bolus, SBP 80s MAPs fluctuating between 50-60s, prompting SICU consult. Lactate 2.3 Additional 500cc x2 given. (Total fluid given so far 3L) Post op Hgb 7.3 from 10, PRBC x1 ordered     24 Hour Events  6/4  NIGHT  -PM Rounds: Levo @ 0.04. left thigh soft; LLE sensation and motor intact; palpable DP/PT   -NICOM 22%, 500cc bolus given  -PM Lopressor held for low SBP   -Hgb 7.3 -> 7.0 --> stat cbc, 6.6 -> 1u PRBC  -Lactate 1.8 - cleared   -No repletions  -UOP 75cc/hr   -CLD   -off levo since unit of blood   -held AM dose lopressor (, diastolic 40)   -restarted DVT PPX    Day  2 g Mg repleted  hypotensive to 80/30s & hgb 7.3--> 1 u PRBC  Ttotal IVF: 3 L     -1 L intra op     -Received 1 push of apple, 500cc of albumin, 500 LR in PACU prior to consult    -500x2 by us  Levo 0.03   Lac 2.3 --> f/u 8 pm lactate   NICOM 22% positive   Holding ASA and Lovenox, possibly restart after post transfusion cbc    cc--> 14 Kiswahili murcia insertion  trop neg        [X] A ten-point review of systems was otherwise negative except as noted above.  [  ] Due to altered mental status/intubation, subjective information was not attained from the patient. History was obtained, to the extent possible, from review of the chart and collateral sources of information.    ----------------------------------------------------------------------------------------------------------   RAFAEL NO   MRN-[id number]  78y ([birth year])F      Admit Date/LOS:  (1d)  Indication for SDU/SICU: hypotension post-op  OR 23 (Day #)        ----------------------------------------------------------------------------------------------------------  HPI   78F TIA residual RLE weakness(), HTN, HLD, CAD s/p KILEY to mLAD stent (2021), AS s/p TAVR, DM, OA, compression fx admitted 6/3/23 as a trauma after a mechanical fall onto her left back and femur earlier today, and unable to ambulate afterwards.  Denies head trauma LOC or AC. Xray LLE +left femur fracture. Taken to OR 23 with Dr Haque for open reduction and internal fixation of left hip using intramedullary hip screw.    OR Stats  OR Time:  12:37 to 13:49 (1 hour 12 min)  EBL: 150cc  IV Fluids: 1000cc  Blood Products: none  UOP: 0cc  Required phenylephrine pushesx2 and brief gtt intra op  Findings: Subtrochanteric fracture Left hip, fracture site puncture through fascia     Extubated successfully in PACU but became hypotensive, transiently responsive to 500cc 5%albumin bolus, 500cc LR bolus, and phenylephrine IVP given by anesthesia. After bolus, SBP 80s MAPs fluctuating between 50-60s, prompting SICU consult. Lactate 2.3 Additional 500cc x2 given. (Total fluid given so far 3L) Post op Hgb 7.3 from 10, PRBC x1 ordered     24 Hour Events    NIGHT  -PM Rounds: Levo @ 0.04. left thigh soft; LLE sensation and motor intact; palpable DP/PT   -NICOM 22%, 500cc bolus given  -PM Lopressor held for low SBP   -Hgb 7.3 -> 7.0 --> stat cbc, 6.6 -> 1u PRBC  -Lactate 1.8 - cleared   -No repletions  -UOP 75cc/hr   -CLD   -off levo since unit of blood   -held AM dose lopressor (, diastolic 40)   -restarted DVT PPX    Day  2 g Mg repleted  hypotensive to 80/30s & hgb 7.3--> 1 u PRBC  Ttotal IVF: 3 L     -1 L intra op     -Received 1 push of apple, 500cc of albumin, 500 LR in PACU prior to consult    -500x2 by us  Levo 0.03   Lac 2.3 --> f/u 8 pm lactate   NICOM 22% positive   Holding ASA and Lovenox, possibly restart after post transfusion cbc    cc--> 14 Arabic murcia insertion  trop neg        [X] A ten-point review of systems was otherwise negative except as noted above.  [  ] Due to altered mental status/intubation, subjective information was not attained from the patient. History was obtained, to the extent possible, from review of the chart and collateral sources of information.    ----------------------------------------------------------------------------------------------------------  Daily Height in cm: 172.72 (2023 11:36)    Daily     Diet, Clear Liquid (23 @ 20:38)      CURRENT MEDS:  Neurologic Medications  acetaminophen     Tablet .. 650 milliGRAM(s) Oral every 6 hours  ALPRAZolam 0.25 milliGRAM(s) Oral daily PRN for anxiety  gabapentin 300 milliGRAM(s) Oral every 8 hours  HYDROmorphone  Injectable 0.25 milliGRAM(s) IV Push every 4 hours PRN breakthrough pain  ondansetron Injectable 4 milliGRAM(s) IV Push every 6 hours PRN Nausea    Respiratory Medications    Cardiovascular Medications  metoprolol tartrate 25 milliGRAM(s) Oral every 12 hours    Gastrointestinal Medications  calcium carbonate 1250 mG  + Vitamin D (OsCal 500 + D) 1 Tablet(s) Oral daily  multivitamin 1 Tablet(s) Oral daily  pantoprazole    Tablet 40 milliGRAM(s) Oral before breakfast  senna 2 Tablet(s) Oral at bedtime  sodium chloride 0.9%. 1000 milliLiter(s) IV Continuous <Continuous>    Genitourinary Medications    Hematologic/Oncologic Medications  heparin   Injectable 5000 Unit(s) SubCutaneous every 8 hours    Antimicrobial/Immunologic Medications  ceFAZolin   IVPB 2000 milliGRAM(s) IV Intermittent every 8 hours    Endocrine/Metabolic Medications  atorvastatin 20 milliGRAM(s) Oral at bedtime  insulin lispro (ADMELOG) corrective regimen sliding scale   SubCutaneous three times a day before meals    Topical/Other Medications      ICU Vital Signs Last 24 Hrs  T(C): 36.9 (2023 04:00), Max: 37.1 (2023 17:40)  T(F): 98.4 (2023 04:00), Max: 98.7 (2023 17:40)  HR: 78 (2023 07:00) (65 - 90)  BP: 115/58 (2023 05:00) (78/48 - 137/66)  BP(mean): 83 (2023 05:00) (66 - 87)  ABP: 111/41 (2023 07:00) (71/713 - 145/48)  ABP(mean): 65 (2023 07:00) (46 - 83)  RR: 17 (2023 07:00) (12 - 44)  SpO2: 100% (2023 07:00) (96% - 100%)    O2 Parameters below as of 2023 04:00  Patient On (Oxygen Delivery Method): room air                    I&O's Summary    2023 07:01  -  2023 07:00  --------------------------------------------------------  IN: 2327.1 mL / OUT: 1250 mL / NET: 1077.1 mL      I&O's Detail    2023 07:01  -  2023 07:00  --------------------------------------------------------  IN:    Lactated Ringers Bolus: 1000 mL    Norepinephrine: 27.1 mL    sodium chloride 0.9%: 1300 mL  Total IN: 2327.1 mL    OUT:    Indwelling Catheter - Urethral (mL): 1250 mL  Total OUT: 1250 mL    Total NET: 1077.1 mL          PHYSICAL EXAM:    General/Neuro: A&O x 3, no focal deficits, GREENE    Lungs: Clear to auscultation, Normal expansion/effort. 1500 on IS    Cardiovascular : S1, S2.  Regular rate and rhythm.    GI: Abdomen soft, Non-tender, Non-distended.      Extremities: Extremities warm, pink, well-perfused. +DP/PT palpable bilaterally. L hip dressing C/D/I    Derm: Good skin turgor, no skin breakdown.      :       Murcia catheter in place.      LABS:  CAPILLARY BLOOD GLUCOSE      POCT Blood Glucose.: 179 mg/dL (2023 06:14)  POCT Blood Glucose.: 193 mg/dL (2023 00:56)  POCT Blood Glucose.: 175 mg/dL (2023 19:12)  POCT Blood Glucose.: 223 mg/dL (2023 14:06)  POCT Blood Glucose.: 116 mg/dL (2023 11:37)                          8.1    6.63  )-----------( 114      ( 2023 02:50 )             23.2       06-04    132<L>  |  102  |  31<H>  ----------------------------<  160<H>  4.5   |  17  |  0.7    Ca    8.1<L>      2023 20:04  Phos  3.4     06-04  Mg     2.4     06-04    TPro  6.4  /  Alb  4.0  /  TBili  0.4  /  DBili  x   /  AST  30  /  ALT  12  /  AlkPhos  66  06-03      PT/INR - ( 2023 22:03 )   PT: 11.00 sec;   INR: 0.97 ratio         PTT - ( 2023 22:03 )  PTT:30.8 sec  CARDIAC MARKERS ( 2023 16:51 )  x     / <0.01 ng/mL / 357 U/L / x     / 4.6 ng/mL      Urinalysis Basic - ( 2023 22:33 )    Color: Yellow / Appearance: Clear / S.020 / pH: x  Gluc: x / Ketone: Small  / Bili: Negative / Urobili: <2 mg/dL   Blood: x / Protein: 30 mg/dL / Nitrite: Negative   Leuk Esterase: Negative / RBC: 2 /HPF / WBC 3 /HPF   Sq Epi: x / Non Sq Epi: x / Bacteria: Negative

## 2023-06-05 NOTE — CONSULT NOTE ADULT - SUBJECTIVE AND OBJECTIVE BOX
HPI:  Patient is a 78-year-old female past medical history of hypertension hyperlipidemia CAD with stents neuropathy seen as a trauma consult coming in here for fall.  Patient had a mechanical fall onto her left back and femur earlier today.  Unable to ambulate afterwards.  Denies head trauma LOC or AC.  No other complaints. Trauma assessment in ED: ABCs intact , GCS 15 , AAOx3.    Patient seen and examined at bedside. HPI noted above. Patient laying in bed and in no acute distress with left leg inverted and adducted. Patient states she was adjusting a balaji on her bed when she lost balance from standing and fell on the left side of her body, she immediately felt the left lower leg pain and was unable to get up, or ambulate on her own after the fall. Xray of LLE notes left femur fracture. She denies any other injury or sites of pain. Neuro exam grossly intact. Vascular, neurological exam normal distal to injury. A/Ox3 and GCS 15. Denies fevers, chills, night sweats, chest pain. Admits to inability to ambulate.      < from: CT Abdomen and Pelvis w/ IV Cont (23 @ 16:04) >  ACC: [id number] EXAM:  CT CHEST IC   ORDERED BY: JASON IBRAHIM     ACC: [id number] EXAM:  CT ABDOMEN AND PELVIS IC   ORDERED BY: JASON IBRAHIM     PROCEDURE DATE:  2023          INTERPRETATION:  CLINICAL HISTORY / REASON FOR EXAM: Fall. Trauma to   chest, abdomen and pelvis    TECHNIQUE: Contiguous axial CT images were obtained from the thoracic   inlet to the pubic symphysis following the administration of 100 mL   Omnipaque 350 intravenous contrast. Oral contrast was not administered.   Coronal, sagittal and 3D/MIP reformatted images are also submitted.    COMPARISON CT: CT abdomen and pelvis from 2021    OTHER STUDIES USED FOR CORRELATION: CT chest from 2021      FINDINGS:    CHEST:    TUBES/LINES: Pacing device within the subcutaneous tissues of the left   chest and leads terminating in the right atrium and right ventricle.    LUNGS, PLEURA, AND AIRWAYS: Bibasilar dependent atelectasis. No   pneumothorax. No consolidation or pleural effusion. Central airways   patent.    MEDIASTINUM/THORACIC NODES: No mediastinal or axillary lymphadenopathy.    HEART/GREAT VESSELS: Status post TAVR. Heart size unremarkable. Dense   multivessel coronary artery calcifications. No aneurysmal dilation of the   thoracic aorta.      ABDOMEN/PELVIS:    HEPATOBILIARY: No subcapsular hematoma.    SPLEEN: No perisplenic fluid.    PANCREAS: Unremarkable.    ADRENAL GLANDS: Unremarkable.    KIDNEYS: Symmetric enhancement bilaterally. No evidence of   hydronephrosis. Right lower pole subcentimeter hypodensity, incompletely   characterized.    ABDOMINOPELVIC NODES: Unremarkable.    PELVIC ORGANS: Unremarkable.    PERITONEUM/MESENTERY/BOWEL: No bowel obstruction, ascites or   intraperitoneal free air. Normal caliber appendix.    BONES/SOFT TISSUES: Acute, displaced left proximal femur fracture,   partially visualized (series 6, image 611). Fat stranding adjacent to   fracture fragment. Severe degenerative changes of the bilateral hips.   Subacute fracture of right posterior rib #10. Chronic compression   deformity L1 vertebral body.    VASCULAR: Normal caliber aorta.      IMPRESSION:    Acute, displaced left proximal femur fracture, partially visualized. See   dedicated pelvis/hip radiographs.    Subacute fracture of right posterior rib #10.    --- End of Report ---    < end of copied text >    < from: CT Cervical Spine No Cont (23 @ 15:44) >  ACC: [id number] EXAM:  CT CERVICAL SPINE   ORDERED BY: [facility]     PROCEDURE DATE:  2023          INTERPRETATION:  Clinical History / Reason for exam: Trauma  The study was performed without IV contrast. Thin section axial and thin   section reformatted sagittal and coronal views were obtained.  No comparison exam.  No fracture or subluxation is seen.  The appearance of C2 vertebral body (series 8 image 52) cannot be   correlated on the axial and coronal series.    IMPRESSION: No fractures seen    --- End of Report ---    < end of copied text >    < from: CT Head No Cont (23 @ 15:44) >  ACC: [id number] EXAM:  CT BRAIN   [facility]     PROCEDURE DATE:  2023          INTERPRETATION:  Clinical History / Reason for exam: Trauma  The study was performed without IV contrast.  Comparison 1/10/2014  The cisterns and ventricles are normal with no shift of the midline   structures.  No hemorrhage, infarct or mass formation is seen.  There is a poorly defined low density area contiguous to the right   occipital horn presumably representing chronic ischemic change.    IMPRESSION: No acute process seen.    --- End of Report ---    < end of copied text >    Imaging:  Multiple views of left hip demonstrates a subtrochanteric fracture.      S/p left hip IMN 23-WBAT LLE as per ortho      Medical charts / labs / imaging studies reviewed       PAST MEDICAL & SURGICAL HISTORY:  Hypertension      HLD (hyperlipidemia)      CAD (coronary artery disease)      OA (osteoarthritis)      Compression fracture of spine   lumbar      Aortic stenosis      Compression fracture of spine      Type 2 diabetes mellitus  diet controlled      OAB (overactive bladder)      Transient ischemic attack (TIA)  2014- with residual weakness on right leg      Falls      Neuropathy      History of coronary artery stent placement  KILEY to mLAD (2021)      History of cataract surgery      H/O oral surgery      History of complete heart block  MDT dual chamber 3/11/2021          Hospital Course:    TODAY'S SUBJECTIVE & REVIEW OF SYMPTOMS:     Constitutional WNL   Cardio WNL   Resp WNL   GI WNL  Heme WNL  Endo WNL  Skin WNL  MSK left hip pain   Neuro WNL  Cognitive WNL  Psych WNL      MEDICATIONS  (STANDING):  acetaminophen     Tablet .. 650 milliGRAM(s) Oral every 6 hours  atorvastatin 20 milliGRAM(s) Oral at bedtime  calcium carbonate 1250 mG  + Vitamin D (OsCal 500 + D) 1 Tablet(s) Oral daily  enoxaparin Injectable 40 milliGRAM(s) SubCutaneous every 24 hours  gabapentin 300 milliGRAM(s) Oral every 8 hours  insulin lispro (ADMELOG) corrective regimen sliding scale   SubCutaneous Before meals and at bedtime  metoprolol tartrate 25 milliGRAM(s) Oral every 12 hours  multivitamin 1 Tablet(s) Oral daily  pantoprazole    Tablet 40 milliGRAM(s) Oral before breakfast  senna 2 Tablet(s) Oral at bedtime  sodium chloride 0.9%. 1000 milliLiter(s) (100 mL/Hr) IV Continuous <Continuous>    MEDICATIONS  (PRN):  HYDROmorphone  Injectable 0.25 milliGRAM(s) IV Push every 4 hours PRN breakthrough pain  ketorolac   Injectable 15 milliGRAM(s) IV Push every 6 hours PRN Severe Pain (7 - 10)  ondansetron Injectable 4 milliGRAM(s) IV Push every 6 hours PRN Nausea      FAMILY HISTORY:  FH: type 2 diabetes  grandfather    FH: stroke  mother    FH: heart disease  father, grandfather        Allergies    Vicodin (Vomiting; Nausea)  codeine (Unknown)    Intolerances        SOCIAL HISTORY:    [  ] Etoh  [  ] Smoking  [  ] Substance abuse     Home Environment:  [   ] Home Alone  [   ] Lives with Family  [   ] Home Health Aid    Dwelling:  [   ] Apartment  [   ] Private House  [ x  ] Assisted living facility   [   ] Skilled Nursing Facility      [   ] Short Term  [   ] Long Term  [   ] Stairs       Elevator [   ]    FUNCTIONAL STATUS PTA: (Check all that apply)  Ambulation: [ x   ]Independent    [   ] Dependent     [   ] Non-Ambulatory  Assistive Device: [   ] SA Cane  [   ]  Q Cane  [  x ] Walker  [   ]  Wheelchair  ADL : [   ] Independent  [ x   ]  Dependent - bathing and donning sox       Vital Signs Last 24 Hrs  T(C): 36.5 (2023 12:00), Max: 37.1 (2023 17:40)  T(F): 97.7 (2023 12:00), Max: 98.7 (2023 17:40)  HR: 92 (2023 16:00) (74 - 95)  BP: 117/56 (2023 16:00) (87/53 - 132/60)  BP(mean): 81 (2023 16:00) (66 - 87)  RR: 21 (2023 16:00) (12 - 44)  SpO2: 100% (2023 15:00) (96% - 100%)    Parameters below as of 2023 08:24  Patient On (Oxygen Delivery Method): room air          PHYSICAL EXAM: Awake & Alert  GENERAL: NAD  HEAD:  Normocephalic  CHEST/LUNG: Clear   HEART: S1S2+  ABDOMEN: Soft, Nontender  EXTREMITIES:  no calf tenderness    NERVOUS SYSTEM:  Cranial Nerves 2-12 intact [   ] Abnormal  [   ]  ROM: WFL all extremities [   ]  Abnormal [ x  ]limited LLE  Motor Strength: WFL all extremities  [   ]  Abnormal [ x  ]limited LLE  Sensation: intact to light touch [ x  ] Abnormal [   ]    FUNCTIONAL STATUS:  Bed Mobility: Independent [   ]  Supervision [   ]  Needs Assistance [  x ]  N/A [   ]  Transfers: Independent [   ]  Supervision [   ]  Needs Assistance [   ]  N/A [   ]   Ambulation: Independent [   ]  Supervision [   ]  Needs Assistance [   ]  N/A [   ]  ADL: Independent [   ] Requires Assistance [   ] N/A [   ]      LABS:                        7.0    6.65  )-----------( 123      ( 2023 14:32 )             19.8     06-04    132<L>  |  102  |  31<H>  ----------------------------<  160<H>  4.5   |  17  |  0.7    Ca    8.1<L>      2023 20:04  Phos  3.4     06-04  Mg     2.4     06-04      PT/INR - ( 2023 22:03 )   PT: 11.00 sec;   INR: 0.97 ratio         PTT - ( 2023 22:03 )  PTT:30.8 sec  Urinalysis Basic - ( 2023 22:33 )    Color: Yellow / Appearance: Clear / S.020 / pH: x  Gluc: x / Ketone: Small  / Bili: Negative / Urobili: <2 mg/dL   Blood: x / Protein: 30 mg/dL / Nitrite: Negative   Leuk Esterase: Negative / RBC: 2 /HPF / WBC 3 /HPF   Sq Epi: x / Non Sq Epi: x / Bacteria: Negative        RADIOLOGY & ADDITIONAL STUDIES:

## 2023-06-05 NOTE — DIETITIAN INITIAL EVALUATION ADULT - OTHER INFO
Pt presented s/p fall and fracture of left femur, now s/p Open reduction and internal fixation of left hip using intramedullary hip screw 6/4.

## 2023-06-05 NOTE — PROGRESS NOTE ADULT - SUBJECTIVE AND OBJECTIVE BOX
Orthopedic Progress/Postoperative Check Note    Patient seen and examined s/p Left hip IMN 6/5/23-POD1  Patient tolerated procedure well  Denies current SOB, nausea,vomiting, numbness, tingling  Post transfusion 8.1 this AM       PE  Awake, alert  NAD  Breathing on RA    LLE  Skin and dressing clean,dry,intact  Compartments soft and compressible, no pain with passive stretch digits  SILT: Tibial,sural,saph,spn,dpn  Motor: Firing EHL,FHL,TA,Gastroc  DP 2+   Toes wwp    AP:  79F s/p left hip IMN 6/4/23-POD1  -WBAT LLE  -Ancef 2g Q8 24 hours (3 doses)  -AM labs (evaluation HGB)- follow-up transfusion CBC  -Diet  -Pain control  -ICS machine  -SCD machine  -Resume Chemical VTE prophylaxis per primary, please dc patient on 6 weeks  daily if no contraindication  -Home medications  -PT/OT for disposition recommendations and gait training   -Orthopedics to follow

## 2023-06-05 NOTE — DIETITIAN INITIAL EVALUATION ADULT - WEIGHT IN KG
68.1 72.6 Ketoconazole Pregnancy And Lactation Text: This medication is Pregnancy Category C and it isn't know if it is safe during pregnancy. It is also excreted in breast milk and breast feeding isn't recommended.

## 2023-06-05 NOTE — CONSULT NOTE ADULT - ASSESSMENT
IMPRESSION: Rehab of left hip subtrochanteric fx, s/p ORIF / HTN/HLD, CAD, AS s/p TAVR    PRECAUTIONS: [   ] Cardiac  [   ] Respiratory  [   ] Seizures [   ] Contact Isolation  [   ] Droplet Isolation  [   ] Other    Weight Bearing Status: WBAT LLE    RECOMMENDATION:    Out of Bed to Chair     DVT/Decubiti Prophylaxis    REHAB PLAN:     [ x   ] Bedside P/T 3-5 times a week   [  x  ]   Bedside O/T  2-3 times a week             [    ] Speech Therapy               [    ]  No Rehab Therapy Indicated   Conditioning/ROM                                    ADL  Bed Mobility                                               Conditioning/ROM  Transfers                                                     Bed Mobility  Sitting /Standing Balance                         Transfers                                        Gait Training                                               Sitting/Standing Balance  Stair Training [   ]Applicable                    Home equipment Eval                                                                        Splinting  [   ] Only      GOALS:   ADL   [  x  ]   Independent                    Transfers  [ x   ] Independent                          Ambulation  [  x  ] Independent     [     x] With device                            [    ]  CG                                                         [    ]  CG                                                                  [    ] CG                            [    ] Min A                                                   [    ] Min A                                                              [    ] Min  A          DISCHARGE PLAN:   [    ]  Good candidate for Intensive Rehabilitation/Hospital based                                             Will tolerate 3hrs Intensive Rehab Daily                                       [     ]  Short Term Rehab in Skilled Nursing Facility                                       [     ]  Home with Outpatient or  services                                         [  x   ]  Possible Candidate for Intensive Hospital based Rehab

## 2023-06-05 NOTE — DIETITIAN INITIAL EVALUATION ADULT - NS FNS DIET ORDER
Diet, Regular:   Consistent Carbohydrate {Evening Snack}  DASH/TLC {Sodium & Cholesterol Restricted} (06-05-23 @ 09:12)

## 2023-06-05 NOTE — DIETITIAN INITIAL EVALUATION ADULT - ENERGY INTAKE
Pt was started on clears this AM and reports good tolerance to RD while she was working on the last item on tray. Diet was advanced to regular after breakfast.

## 2023-06-05 NOTE — DIETITIAN INITIAL EVALUATION ADULT - ADD RECOMMEND
need to assess ability to perform ADLs to determine if pt requires more assistance than assisted living before discharge assess ability to perform ADLs to determine if pt requires more assistance than assisted living before discharge -- pt observed using non-dominant hand to feed herself. States she is not able to use right hand to hold a pen. Limited ability to walk.

## 2023-06-05 NOTE — DIETITIAN INITIAL EVALUATION ADULT - ORAL INTAKE PTA/DIET HISTORY
hx obtained from pt at bedside. Reports good PO intake PTA. States since she moved in to assisted living in Sep 2022, she started gaining weight due to increased PO intake with food provided. She receives low cholesterol diet. No food allergy/intolerance. Takes Caltrate 600 + D and Osteo Bi-Flex 250 mg-200 mg as prescribed for osteoporosis, in addition to Multiple Vitamins and Fish Oil supplements. No chewing/swallowing issues. UBW 150lbs (Sep 2022, before weight gain) -> current weight 160lbs. Height 5'8".   hx obtained from pt at bedside. Reports good PO intake PTA. States since she moved in to assisted living in Sep 2022, she started gaining weight due to increased PO intake with food provided. She receives low cholesterol diet. States she used to have DM, which resolved when she lost weight from 200lbs to 150lbs after spine fracture (she was not able to ambulate and cook for herself as much after that incident). No food allergy/intolerance. Takes Caltrate 600 + D and Osteo Bi-Flex 250 mg-200 mg as prescribed for osteoporosis, in addition to Multiple Vitamins and Fish Oil supplements. No chewing/swallowing issues. UBW 150lbs (Sep 2022, before weight gain) -> current weight 160lbs. Height 5'8".

## 2023-06-05 NOTE — DIETITIAN INITIAL EVALUATION ADULT - NSFNSGIIOFT_GEN_A_CORE
I&O's Detail    04 Jun 2023 07:01  -  05 Jun 2023 07:00  --------------------------------------------------------  IN:    Lactated Ringers Bolus: 1000 mL    Norepinephrine: 27.1 mL    sodium chloride 0.9%: 1300 mL  Total IN: 2327.1 mL    OUT:    Indwelling Catheter - Urethral (mL): 1250 mL  Total OUT: 1250 mL    Total NET: 1077.1 mL

## 2023-06-05 NOTE — DIETITIAN INITIAL EVALUATION ADULT - PERTINENT LABORATORY DATA
06-04    132<L>  |  102  |  31<H>  ----------------------------<  160<H>  4.5   |  17  |  0.7    Ca    8.1<L>      04 Jun 2023 20:04  Phos  3.4     06-04  Mg     2.4     06-04    POCT Blood Glucose.: 189 mg/dL (06-05-23 @ 11:08)

## 2023-06-05 NOTE — PROGRESS NOTE ADULT - ASSESSMENT
Assessment & Plan  78F HTN/HLD, CAD, AS s/p TAVR, admitted 6/3/23 mechanical fall found to have left femur fracture now s/p ORIF 6/4/23 c/b post-op hypotension requiring SICU admission    NEURO:  #Acute pain post-op    -APAP prn    -Gabapentin 300mg TID  #hx anxiety    -continue home xanax 0.25mg PRN    -delirium prevention with frequent reorientation     RESP:  #Oxygenation     -Extubated postoperatively   -Saturating well on NC; wean to RA as tolerated    -incentive spirometry    CARDS:   #Hypotension likely 2/2 hypovolemia post-op requiring intermittent peripheral levophed      -s/p 2u PRBCs and IVF resuscitation     - NICOM positive (6/4)  #HTN    - holding home anti-HTN meds (lisinopril 10, amlodipine 5mg) while hypotensive   #CAD s/p PCI   #aortic stenosis s/p TAVR    - home ASA held until Hgb stable    -Holding home chlorthalidone 25mg     -Continue with home metoprolol 25mg BID   #HLD    - Lipitor 20mg daily (substituted for home Simvastatin 40)   #s/p PPM   - last EP interrogation 6/4:     [employer] DC PPM interrogated 6/4/23    Episode of VT x 8 seconds 5/28/23    Several brief episodes NSVT    Device functioning properly  Imaging    -Echo (6/4): EF 69%; G1DD    -Post-op EKG (6/4): Sinus rhythm with 1st degree AVB   Lab     -Post-op Trop negative x1    GI/NUTR:   #Diet    -Clear liquid diet; will advance as tolerated   #GI prophylaxis     -Protonix  #Bowel Regimen    -Senna     /RENAL:   #suspected hypovolemia    - Received 1L in OR; 500cc albumin + 500cc LR in PACU prior to SICU consult; additional 1L LR given in PACU = 3L given prior to arrival in ICU    - (6/5) Garrison placed as no void noted and hemodynamically fluctuant   #hx overactive bladder    - holding home Oxybutynin iso retention    Labs:          BUN/Cr- 33/0.8  -->,  31/0.7  -->          Electrolytes-Na 132 // K 4.5 // Mg 2.4 //  Phos 3.4 (06-04 @ 20:04)    HEME/ONC:   #Anemia     - Total transfusions: 2u PRBCs (6/4)   #DVT Prophylaxis     - Lovenox and home ASA originally restarted per Surgery, but holding until confirmed Hgb stable    - SCD    Labs: Hb/Hct:  7.0/21.0  -->,  6.6/19.4  -->                      Plts:  146  -->,  134  -->                 PTT/INR:      T&S Expires: 6/7/23    ID:  WBC- 10.75  --->>,  8.14  --->>,  7.15  --->>  Temp trend- 24hrs T(F): 98 (06-05 @ 00:00), Max: 98.7 (06-04 @ 17:40)  Antibiotics    -ceFAZolin IVPB 2000 every 8 hours x3 doses post-op   Cultures:    -None       ENDO:  #DM2 (controlled with diet)     -FS Q6h    -sliding scale insulin    MSK:  #L femur fracture s/p ORIF w/intramedullar hip screw    -Left Lower Extremity:  Weight Bearing As Tolerated (06-04-23 @ 14:15)    -Activity - Ambulate as Tolerated Time/Priority:  Routine (06-04-23 @ 14:12)    -Ortho recs: d/c patient on 6 weeks of  daily if no contraindications     LINES/DRAINS:  PIV, Garrison (6/4/23), right radial a-line     ADVANCED DIRECTIVES:  Full Code    HCP/Emergency Contact-    INDICATION FOR SICU/SDU: hypotension post-op    DISPO:  SICU vs SDU vs Downgrade      Assessment & Plan  78F HTN/HLD, CAD, AS s/p TAVR, admitted 6/3/23 mechanical fall found to have left femur fracture now s/p ORIF 6/4/23 c/b post-op hypotension requiring SICU admission    NEURO:  #Acute pain post-op    -APAP prn    -Gabapentin 300mg TID  #hx anxiety    -continue home xanax 0.25mg PRN    -delirium prevention with frequent reorientation     RESP:  #Oxygenation     -Extubated postoperatively   -Saturating well on NC; wean to RA as tolerated    -incentive spirometry    CARDS:   #Hypotension likely 2/2 hypovolemia post-op requiring intermittent peripheral levophed      -s/p 2u PRBCs and IVF resuscitation     - NICOM positive (6/4)  #HTN    - holding home anti-HTN meds (lisinopril 10, amlodipine 5mg) while hypotensive   #CAD s/p PCI   #aortic stenosis s/p TAVR    - home ASA held until Hgb stable    -Holding home chlorthalidone 25mg     -Continue with home metoprolol 25mg BID   #HLD    - Lipitor 20mg daily (substituted for home Simvastatin 40)   #s/p PPM   - last EP interrogation 6/4:     [employer] DC PPM interrogated 6/4/23    Episode of VT x 8 seconds 5/28/23    Several brief episodes NSVT    Device functioning properly  Imaging    -Echo (6/4): EF 69%; G1DD    -Post-op EKG (6/4): Sinus rhythm with 1st degree AVB   Lab     -Post-op Trop negative x1    GI/NUTR:   #Diet    -Clear liquid diet; will advance as tolerated   #GI prophylaxis     -Protonix  #Bowel Regimen    -Senna     /RENAL:   #suspected hypovolemia    - Received 1L in OR; 500cc albumin + 500cc LR in PACU prior to SICU consult; additional 1L LR given in PACU = 3L given prior to arrival in ICU    - (6/5) Garrison placed as no void noted and hemodynamically fluctuant   #hx overactive bladder    - holding home Oxybutynin iso retention    Labs:          BUN/Cr- 33/0.8  -->,  31/0.7  -->          Electrolytes-Na 132 // K 4.5 // Mg 2.4 //  Phos 3.4 (06-04 @ 20:04)    HEME/ONC:   #Anemia     - Total transfusions: 2u PRBCs (6/4)   #DVT Prophylaxis     - restarted DVT ppx      - SCD     - per ortho: restart  upon d/c      Labs: Hb/Hct:  7.0/21.0  -->,  6.6/19.4  -->                      Plts:  146  -->,  134  -->                 PTT/INR:      T&S Expires: 6/7/23    ID:  WBC- 10.75  --->>,  8.14  --->>,  7.15  --->>  Temp trend- 24hrs T(F): 98 (06-05 @ 00:00), Max: 98.7 (06-04 @ 17:40)  Antibiotics    -ceFAZolin IVPB 2000 every 8 hours x3 doses post-op   Cultures:    -None       ENDO:  #DM2 (controlled with diet)     -FS Q6h    -sliding scale insulin    MSK:  #L femur fracture s/p ORIF w/intramedullar hip screw    -Left Lower Extremity:  Weight Bearing As Tolerated (06-04-23 @ 14:15)    -Activity - Ambulate as Tolerated Time/Priority:  Routine (06-04-23 @ 14:12)    -Ortho recs: d/c patient on 6 weeks of  daily if no contraindications     LINES/DRAINS:  DOT, Garrison (6/4/23), right radial a-line     ADVANCED DIRECTIVES:  Full Code    HCP/Emergency Contact-    INDICATION FOR SICU/SDU: hypotension post-op    DISPO:  SICU vs SDU vs Downgrade      Assessment & Plan  78F HTN/HLD, CAD, AS s/p TAVR, admitted 6/3/23 mechanical fall found to have left femur fracture now s/p ORIF 6/4/23 c/b post-op hypotension requiring SICU admission    NEURO:  #Acute pain post-op    -APAP prn    -Gabapentin 300mg TID  -dilaudid 0.25 q 4 PRN  #hx anxiety    -continue home xanax 0.25mg PRN    -delirium prevention with frequent reorientation     RESP:  #Oxygenation     -Extubated postoperatively   -Saturating well on NC; wean to RA as tolerated    -incentive spirometry 1500    CARDS:   #Hypotension likely 2/2 hypovolemia post-op requiring intermittent peripheral levophed      -s/p 2u PRBCs and IVF resuscitation     - NICOM positive (6/4)  #HTN    - holding home anti-HTN meds (lisinopril 10, amlodipine 5mg) while hypotensive   #CAD s/p PCI   #aortic stenosis s/p TAVR    - home ASA held until Hgb stable    -Holding home chlorthalidone 25mg     -Continue with home metoprolol 25mg BID   #HLD    - Lipitor 20mg daily (substituted for home Simvastatin 40)   #s/p PPM   - last EP interrogation 6/4:     [employer] DC PPM interrogated 6/4/23    Episode of VT x 8 seconds 5/28/23    Several brief episodes NSVT    Device functioning properly  Imaging    -Echo (6/4): EF 69%; G1DD    -Post-op EKG (6/4): Sinus rhythm with 1st degree AVB   Lab     -Post-op Trop negative x1    GI/NUTR:   #Diet    -Clear liquid diet; will advance as tolerated   #GI prophylaxis     -Protonix  #Bowel Regimen    -Senna     /RENAL:   #suspected hypovolemia    - Received 1L in OR; 500cc albumin + 500cc LR in PACU prior to SICU consult; additional 1L LR given in PACU = 3L given prior to arrival in ICU    - (6/5) Garrison placed as no void noted and hemodynamically fluctuant   #hx overactive bladder    - holding home Oxybutynin iso retention    Labs:          BUN/Cr- 33/0.8  -->,  31/0.7  -->          Electrolytes-Na 132 // K 4.5 // Mg 2.4 //  Phos 3.4 (06-04 @ 20:04)    HEME/ONC:   #Anemia     - Total transfusions: 2u PRBCs (6/4)   #DVT Prophylaxis     - restarted DVT ppx      - SCD     - per ortho: restart  upon d/c      Labs: Hb/Hct:  7.0/21.0  -->,  6.6/19.4  -->                      Plts:  146  -->,  134  -->                 PTT/INR:      T&S Expires: 6/7/23    ID:  WBC- 10.75  --->>,  8.14  --->>,  7.15  --->>  Temp trend- 24hrs T(F): 98 (06-05 @ 00:00), Max: 98.7 (06-04 @ 17:40)  Antibiotics    -ceFAZolin IVPB 2000 every 8 hours x3 doses post-op   Cultures:    -None       ENDO:  #DM2 (controlled with diet)     -FS Q6h    -sliding scale insulin    MSK:  #L femur fracture s/p ORIF w/intramedullar hip screw    -Left Lower Extremity:  Weight Bearing As Tolerated (06-04-23 @ 14:15)    -Activity - Ambulate as Tolerated Time/Priority:  Routine (06-04-23 @ 14:12)    -Ortho recs: d/c patient on 6 weeks of  daily if no contraindications   -PT/OT c/s in place  LINES/DRAINS:  Bladimir CHRIS (6/4/23), right radial a-line     ADVANCED DIRECTIVES:  Full Code    HCP/Emergency Contact-    INDICATION FOR SICU/SDU: hypotension post-op    DISPO:  SICU vs SDU vs Downgrade      Assessment & Plan  78F HTN/HLD, CAD, AS s/p TAVR, admitted 6/3/23 mechanical fall found to have left femur fracture now s/p ORIF 6/4/23 c/b post-op hypotension requiring SICU admission    NEURO:  #Acute pain post-op    -APAP prn    -Gabapentin 300mg TID  -dilaudid 0.25 q 4 PRN  #hx anxiety    -continue home xanax 0.25mg PRN    -delirium prevention with frequent reorientation     RESP:  #Oxygenation     -Extubated postoperatively   -Saturating well on NC; wean to RA as tolerated    -incentive spirometry 1500    CARDS:   #Hypotension likely 2/2 hypovolemia post-op requiring intermittent peripheral levophed      -s/p 2u PRBCs and IVF resuscitation     - NICOM positive (6/4)  #HTN    - holding home anti-HTN meds (lisinopril 10, amlodipine 5mg) while hypotensive   #CAD s/p PCI   #aortic stenosis s/p TAVR    - home ASA held until Hgb stable    -Holding home chlorthalidone 25mg     -Continue with home metoprolol 25mg BID   #HLD    - Lipitor 20mg daily (substituted for home Simvastatin 40)   #s/p PPM   - last EP interrogation 6/4:     [employer] DC PPM interrogated 6/4/23    Episode of VT x 8 seconds 5/28/23    Several brief episodes NSVT    Device functioning properly  Imaging    -Echo (6/4): EF 69%; G1DD    -Post-op EKG (6/4): Sinus rhythm with 1st degree AVB   Lab     -Post-op Trop negative x1    GI/NUTR:   #Diet    -Clear liquid diet; will advance as tolerated   #GI prophylaxis     -Protonix  #Bowel Regimen    -Senna     /RENAL:   #suspected hypovolemia    - Received 1L in OR; 500cc albumin + 500cc LR in PACU prior to SICU consult; additional 1L LR given in PACU = 3L given prior to arrival in ICU    - (6/5) Garrison placed as no void noted and hemodynamically fluctuant   #hx overactive bladder    - holding home Oxybutynin iso retention    Labs:          BUN/Cr- 33/0.8  -->,  31/0.7  -->          Electrolytes-Na 132 // K 4.5 // Mg 2.4 //  Phos 3.4 (06-04 @ 20:04)    HEME/ONC:   #Anemia     - Total transfusions: 2u PRBCs (6/4)   #DVT Prophylaxis     - restarted DVT ppx      - SCD     - per ortho: restart  upon d/c      Labs: Hb/Hct:  7.0/21.0  -->,  6.6/19.4  -->                      Plts:  146  -->,  134  -->                 PTT/INR:      T&S Expires: 6/7/23    ID:  WBC- 10.75  --->>,  8.14  --->>,  7.15  --->>  Temp trend- 24hrs T(F): 98 (06-05 @ 00:00), Max: 98.7 (06-04 @ 17:40)  Antibiotics    -ceFAZolin IVPB 2000 every 8 hours x3 doses post-op   Cultures:    -None       ENDO:  #DM2 (controlled with diet)     -FS Q6h    -sliding scale insulin    MSK:  #L femur fracture s/p ORIF w/intramedullar hip screw    -Left Lower Extremity:  Weight Bearing As Tolerated (06-04-23 @ 14:15)    -Activity - Ambulate as Tolerated Time/Priority:  Routine (06-04-23 @ 14:12)    -Ortho recs: d/c patient on 6 weeks of  daily if no contraindications   -PT/OT c/s in place  LINES/DRAINS:  Bladimir CHRIS (6/4/23), right radial a-line     ADVANCED DIRECTIVES:  Full Code    HCP/Emergency Contact-    INDICATION FOR SICU/SDU: hypotension post-op    DISPO:  SICU

## 2023-06-05 NOTE — DIETITIAN INITIAL EVALUATION ADULT - DIET TYPE
DASH/TLC (sodium and cholesterol restricted diet) as medically feasible/DASH/TLC (sodium and cholesterol restricted diet)

## 2023-06-05 NOTE — DIETITIAN INITIAL EVALUATION ADULT - PERTINENT MEDS FT
MEDICATIONS  (STANDING):  acetaminophen     Tablet .. 650 milliGRAM(s) Oral every 6 hours  atorvastatin 20 milliGRAM(s) Oral at bedtime  calcium carbonate 1250 mG  + Vitamin D (OsCal 500 + D) 1 Tablet(s) Oral daily  enoxaparin Injectable 40 milliGRAM(s) SubCutaneous every 24 hours  gabapentin 300 milliGRAM(s) Oral every 8 hours  insulin lispro (ADMELOG) corrective regimen sliding scale   SubCutaneous Before meals and at bedtime  metoprolol tartrate 25 milliGRAM(s) Oral every 12 hours  multivitamin 1 Tablet(s) Oral daily  pantoprazole    Tablet 40 milliGRAM(s) Oral before breakfast  senna 2 Tablet(s) Oral at bedtime  sodium chloride 0.9% Bolus 250 milliLiter(s) IV Bolus once  sodium chloride 0.9%. 1000 milliLiter(s) (100 mL/Hr) IV Continuous <Continuous>    MEDICATIONS  (PRN):  HYDROmorphone  Injectable 0.25 milliGRAM(s) IV Push every 4 hours PRN breakthrough pain  ketorolac   Injectable 15 milliGRAM(s) IV Push every 6 hours PRN Severe Pain (7 - 10)  ondansetron Injectable 4 milliGRAM(s) IV Push every 6 hours PRN Nausea

## 2023-06-06 LAB
BASOPHILS # BLD AUTO: 0.02 K/UL — SIGNIFICANT CHANGE UP (ref 0–0.2)
BASOPHILS NFR BLD AUTO: 0.3 % — SIGNIFICANT CHANGE UP (ref 0–1)
EOSINOPHIL # BLD AUTO: 0.14 K/UL — SIGNIFICANT CHANGE UP (ref 0–0.7)
EOSINOPHIL NFR BLD AUTO: 2.2 % — SIGNIFICANT CHANGE UP (ref 0–8)
GLUCOSE BLDC GLUCOMTR-MCNC: 118 MG/DL — HIGH (ref 70–99)
GLUCOSE BLDC GLUCOMTR-MCNC: 120 MG/DL — HIGH (ref 70–99)
GLUCOSE BLDC GLUCOMTR-MCNC: 137 MG/DL — HIGH (ref 70–99)
GLUCOSE BLDC GLUCOMTR-MCNC: 156 MG/DL — HIGH (ref 70–99)
HCT VFR BLD CALC: 21.8 % — LOW (ref 37–47)
HCT VFR BLD CALC: 22.6 % — LOW (ref 37–47)
HGB BLD-MCNC: 7.3 G/DL — LOW (ref 12–16)
HGB BLD-MCNC: 7.7 G/DL — LOW (ref 12–16)
IMM GRANULOCYTES NFR BLD AUTO: 0.8 % — HIGH (ref 0.1–0.3)
LYMPHOCYTES # BLD AUTO: 1.39 K/UL — SIGNIFICANT CHANGE UP (ref 1.2–3.4)
LYMPHOCYTES # BLD AUTO: 21.8 % — SIGNIFICANT CHANGE UP (ref 20.5–51.1)
MCHC RBC-ENTMCNC: 29.8 PG — SIGNIFICANT CHANGE UP (ref 27–31)
MCHC RBC-ENTMCNC: 30.3 PG — SIGNIFICANT CHANGE UP (ref 27–31)
MCHC RBC-ENTMCNC: 33.5 G/DL — SIGNIFICANT CHANGE UP (ref 32–37)
MCHC RBC-ENTMCNC: 34.1 G/DL — SIGNIFICANT CHANGE UP (ref 32–37)
MCV RBC AUTO: 87.6 FL — SIGNIFICANT CHANGE UP (ref 81–99)
MCV RBC AUTO: 90.5 FL — SIGNIFICANT CHANGE UP (ref 81–99)
MONOCYTES # BLD AUTO: 0.74 K/UL — HIGH (ref 0.1–0.6)
MONOCYTES NFR BLD AUTO: 11.6 % — HIGH (ref 1.7–9.3)
NEUTROPHILS # BLD AUTO: 4.03 K/UL — SIGNIFICANT CHANGE UP (ref 1.4–6.5)
NEUTROPHILS NFR BLD AUTO: 63.3 % — SIGNIFICANT CHANGE UP (ref 42.2–75.2)
NRBC # BLD: 0 /100 WBCS — SIGNIFICANT CHANGE UP (ref 0–0)
NRBC # BLD: 0 /100 WBCS — SIGNIFICANT CHANGE UP (ref 0–0)
PLATELET # BLD AUTO: 108 K/UL — LOW (ref 130–400)
PLATELET # BLD AUTO: 111 K/UL — LOW (ref 130–400)
PMV BLD: 10.7 FL — HIGH (ref 7.4–10.4)
PMV BLD: 9.9 FL — SIGNIFICANT CHANGE UP (ref 7.4–10.4)
RBC # BLD: 2.41 M/UL — LOW (ref 4.2–5.4)
RBC # BLD: 2.58 M/UL — LOW (ref 4.2–5.4)
RBC # FLD: 16.1 % — HIGH (ref 11.5–14.5)
RBC # FLD: 16.1 % — HIGH (ref 11.5–14.5)
WBC # BLD: 6.37 K/UL — SIGNIFICANT CHANGE UP (ref 4.8–10.8)
WBC # BLD: 7.16 K/UL — SIGNIFICANT CHANGE UP (ref 4.8–10.8)
WBC # FLD AUTO: 6.37 K/UL — SIGNIFICANT CHANGE UP (ref 4.8–10.8)
WBC # FLD AUTO: 7.16 K/UL — SIGNIFICANT CHANGE UP (ref 4.8–10.8)

## 2023-06-06 PROCEDURE — 99291 CRITICAL CARE FIRST HOUR: CPT | Mod: 25,24

## 2023-06-06 PROCEDURE — 71045 X-RAY EXAM CHEST 1 VIEW: CPT | Mod: 26

## 2023-06-06 RX ORDER — FUROSEMIDE 40 MG
10 TABLET ORAL ONCE
Refills: 0 | Status: COMPLETED | OUTPATIENT
Start: 2023-06-06 | End: 2023-06-06

## 2023-06-06 RX ADMIN — GABAPENTIN 300 MILLIGRAM(S): 400 CAPSULE ORAL at 05:38

## 2023-06-06 RX ADMIN — GABAPENTIN 300 MILLIGRAM(S): 400 CAPSULE ORAL at 13:01

## 2023-06-06 RX ADMIN — Medication 650 MILLIGRAM(S): at 17:02

## 2023-06-06 RX ADMIN — ENOXAPARIN SODIUM 40 MILLIGRAM(S): 100 INJECTION SUBCUTANEOUS at 09:43

## 2023-06-06 RX ADMIN — Medication 10 MILLIGRAM(S): at 17:01

## 2023-06-06 RX ADMIN — Medication 650 MILLIGRAM(S): at 00:01

## 2023-06-06 RX ADMIN — Medication 650 MILLIGRAM(S): at 05:37

## 2023-06-06 RX ADMIN — Medication 2: at 11:45

## 2023-06-06 RX ADMIN — Medication 650 MILLIGRAM(S): at 07:31

## 2023-06-06 RX ADMIN — Medication 15 MILLIGRAM(S): at 07:32

## 2023-06-06 RX ADMIN — Medication 1 TABLET(S): at 11:24

## 2023-06-06 RX ADMIN — Medication 1 TABLET(S): at 11:25

## 2023-06-06 RX ADMIN — GABAPENTIN 300 MILLIGRAM(S): 400 CAPSULE ORAL at 22:14

## 2023-06-06 RX ADMIN — Medication 15 MILLIGRAM(S): at 18:50

## 2023-06-06 RX ADMIN — Medication 650 MILLIGRAM(S): at 11:25

## 2023-06-06 RX ADMIN — HYDROMORPHONE HYDROCHLORIDE 0.25 MILLIGRAM(S): 2 INJECTION INTRAMUSCULAR; INTRAVENOUS; SUBCUTANEOUS at 15:08

## 2023-06-06 RX ADMIN — HYDROMORPHONE HYDROCHLORIDE 0.25 MILLIGRAM(S): 2 INJECTION INTRAMUSCULAR; INTRAVENOUS; SUBCUTANEOUS at 22:14

## 2023-06-06 RX ADMIN — Medication 25 MILLIGRAM(S): at 05:39

## 2023-06-06 RX ADMIN — Medication 650 MILLIGRAM(S): at 01:00

## 2023-06-06 RX ADMIN — Medication 25 MILLIGRAM(S): at 18:56

## 2023-06-06 RX ADMIN — Medication 15 MILLIGRAM(S): at 12:31

## 2023-06-06 RX ADMIN — Medication 63.75 MILLIMOLE(S): at 00:59

## 2023-06-06 RX ADMIN — ATORVASTATIN CALCIUM 20 MILLIGRAM(S): 80 TABLET, FILM COATED ORAL at 22:14

## 2023-06-06 RX ADMIN — SODIUM CHLORIDE 75 MILLILITER(S): 9 INJECTION, SOLUTION INTRAVENOUS at 11:47

## 2023-06-06 RX ADMIN — Medication 15 MILLIGRAM(S): at 06:30

## 2023-06-06 NOTE — PROGRESS NOTE ADULT - ASSESSMENT
s/p left hip orif pod 2     pain control   dvt proph   fu labs serial cbc until stable   pt ot  rehab   continue sicu management   will monitor drainage

## 2023-06-06 NOTE — PROGRESS NOTE ADULT - ASSESSMENT
Assessment & Plan  78F HTN/HLD, CAD, AS s/p TAVR, admitted 6/3/23 mechanical fall found to have left femur fracture now s/p ORIF 6/4/23 c/b post-op hypotension requiring SICU admission    NEURO:  #Acute pain post-op    -APAP prn    -Gabapentin 300mg TID  -toradol 15mg q 6   -dilaudid 0.25 q 4 PRN  #hx anxiety  -xanax dc'd     RESP:  #Oxygenation     -Extubated postoperatively   -Saturating well on RA    -incentive spirometry    CARDS:   #Hypotension likely 2/2 hypovolemia post-op requiring intermittent peripheral levophed      -s/p 2u PRBCs and IVF resuscitation     - NICOM positive (6/4); NICOM negative (6/5)   #HTN    - holding home anti-HTN meds (lisinopril 10, amlodipine 5mg) while hypotensive   #CAD s/p PCI   #aortic stenosis s/p TAVR    - home ASA held until Hgb stable    -Holding home chlorthalidone 25mg     -Continue with home metoprolol 25mg BID   #HLD    - Lipitor 20mg daily (substituted for home Simvastatin 40)   #s/p PPM   - last EP interrogation 6/4:     [employer] DC PPM interrogated 6/4/23    Episode of VT x 8 seconds 5/28/23    Several brief episodes NSVT    Device functioning properly  Imaging    -Echo (6/4): EF 69%; G1DD    -Post-op EKG (6/4): Sinus rhythm with 1st degree AVB   Lab     -Post-op Trop negative x1    GI/NUTR:   #Diet    -DASH/TLC   #GI prophylaxis     -not indicated  #Bowel Regimen    -Senna     /RENAL:   #suspected hypovolemia    - Received 1L in OR; 500cc albumin + 500cc LR in PACU prior to SICU consult; additional 1L LR given in PACU = Total 3L given prior to arrival in ICU    - (6/5) [place] placed as no void noted and hemodynamically fluctuant   -dc murcia once ambulatory   #hx overactive bladder    - holding home Oxybutynin iso retention    Labs:          BUN/Cr- 31/0.7  -->,  31/0.9  -->          Electrolytes-Na 136 // K 4.2 // Mg 2.0 //  Phos 1.8 (06-05 @ 21:10)    HEME/ONC:   #Anemia     - Total transfusions: 3u PRBCs (6/4 + 6/5)   #DVT Prophylaxis     - lovenox     - SCD     - per ortho: restart  upon d/c      Labs: Hb/Hct:  7.0/19.8  -->1u PRBC -->  7.8/23.3  -->                      Plts:  123  -->,  114  -->                 PTT/INR:      T&S Expires: 6/7/23    ID:  WBC- 6.63  --->>,  6.65  --->>,  5.89  --->>  Temp trend- 24hrs T(F): 97.9 (06-06 @ 00:00), Max: 98.4 (06-05 @ 04:00)  Antibiotics    -completed ceFAZolin x3 doses post-op   Cultures:    -None       ENDO:  #DM2 (controlled with diet)     -FS Q6h    -on ISS    MSK:  #L femur fracture s/p ORIF w/intramedullar hip screw    -Left Lower Extremity:  Weight Bearing As Tolerated     -Activity - Ambulate as Tolerated     -Ortho recs: d/c patient on 6 weeks of  daily if no contraindications   -PT/OT c/s in place  LINES/DRAINS:  Bladimir CHRIS (6/4/23), R radial a-line    ADVANCED DIRECTIVES:  Full Code    HCP/Emergency Contact- Ehsan (spouse) [phone]    INDICATION FOR SICU/SDU: hypotension post-op    DISPO:  SICU   Social work and physiatry consulted for discharge planning; possible candidate for intensive hospital based rehab Assessment & Plan  78F HTN/HLD, CAD, AS s/p TAVR, admitted 6/3/23 mechanical fall found to have left femur fracture now s/p ORIF 6/4/23 c/b post-op hypotension requiring SICU admission    NEURO:  #Acute pain post-op    -APAP prn    -Gabapentin 300mg TID  -toradol 15mg q 6   -dilaudid 0.25 q 4 PRN  #hx anxiety  -xanax dc'd     RESP:  #Oxygenation     -Extubated postoperatively   -Saturating well on RA    -incentive spirometry 1500     CARDS:   #Hypotension likely 2/2 hypovolemia post-op requiring intermittent peripheral levophed      -s/p 2u PRBCs and IVF resuscitation     - NICOM positive (6/4); NICOM negative (6/5)   #HTN    - holding home anti-HTN meds (lisinopril 10, amlodipine 5mg) while hypotensive   #CAD s/p PCI   #aortic stenosis s/p TAVR    - home ASA held until Hgb stable    -Holding home chlorthalidone 25mg     -Continue with home metoprolol 25mg BID   #HLD    - Lipitor 20mg daily (substituted for home Simvastatin 40)   #s/p PPM   - last EP interrogation 6/4:     [employer] DC PPM interrogated 6/4/23    Episode of VT x 8 seconds 5/28/23    Several brief episodes NSVT    Device functioning properly  Imaging    -Echo (6/4): EF 69%; G1DD    -Post-op EKG (6/4): Sinus rhythm with 1st degree AVB   Lab     -Post-op Trop negative x1    GI/NUTR:   #Diet    -DASH/TLC   #GI prophylaxis     -not indicated  #Bowel Regimen    -Senna   -last BM today 06/06    /RENAL:   #suspected hypovolemia    - Received 1L in OR; 500cc albumin + 500cc LR in PACU prior to SICU consult; additional 1L LR given in PACU = Total 3L given prior to arrival in ICU    - (6/5) Murcia placed as no void noted and hemodynamically fluctuant   -f/u dc murcia   #hx overactive bladder    - holding home Oxybutynin iso retention    Labs:          BUN/Cr- 31/0.7  -->,  31/0.9  -->          Electrolytes-Na 136 // K 4.2 // Mg 2.0 //  Phos 1.8 (06-05 @ 21:10)    HEME/ONC:   #Anemia     - Total transfusions: 3u PRBCs (6/4 + 6/5)   -last transfusion 6/5 afternoon  #DVT Prophylaxis     - lovenox     - SCD     - per ortho: restart  upon d/c      Labs: Hb/Hct:  7.8/23.3  -->,  7.7/22.6  -->                      Plts:  114  -->,  111  -->                 PTT/INR:      T&S Expires: 6/7/23    ID:  WBC- 6.63  --->>,  6.65  --->>,  5.89  --->>  Temp trend- 24hrs T(F): 97.9 (06-06 @ 00:00), Max: 98.4 (06-05 @ 04:00)  Antibiotics    -completed ceFAZolin x3 doses post-op   Cultures:    -None       ENDO:  #DM2 (controlled with diet)     -FS Q6h    -on ISS    MSK:  #L femur fracture s/p ORIF w/intramedullar hip screw    -Left Lower Extremity:  Weight Bearing As Tolerated     -Activity - Ambulate as Tolerated     -Ortho recs: d/c patient on 6 weeks of  daily if no contraindications   -PT/OT c/s in place  LINES/DRAINS:  Bladimir CHRIS (6/4/23), R radial a-line    ADVANCED DIRECTIVES:  Full Code    HCP/Emergency Contact- Ehsan (spouse) [phone]    INDICATION FOR SICU/SDU: hypotension post-op    DISPO:  SICU   Social work and physiatry consulted for discharge planning; possible candidate for intensive hospital based rehab

## 2023-06-06 NOTE — OCCUPATIONAL THERAPY INITIAL EVALUATION ADULT - PERTINENT HX OF CURRENT PROBLEM, REHAB EVAL
78-year-old female past medical history of hypertension hyperlipidemia CAD with stents neuropathy seen as a trauma consult coming in here for fall.  Patient had a mechanical fall onto her left back and femur earlier today.  Unable to ambulate afterwards.  Denies head trauma LOC or AC.  No other complaints. Trauma assessment in ED: ABCs intact , GCS 15 , AAOx3.    Patient seen and examined at bedside. HPI noted above. Patient laying in bed and in no acute distress with left leg inverted and adducted. Patient states she was adjusting a balaji on her bed when she lost balance from standing and fell on the left side of her body, she immediately felt the left lower leg pain and was unable to get up, or ambulate on her own after the fall. Xray of LLE notes left femur fracture. She denies any other injury or sites of pain. Neuro exam grossly intact. Vascular, neurological exam normal distal to injury. A/Ox3 and GCS 15. Denies fevers, chills, night sweats, chest pain. Admits to inability to ambulate.

## 2023-06-06 NOTE — PROGRESS NOTE ADULT - SUBJECTIVE AND OBJECTIVE BOX
s/p left hip orif pod 2   pt seen at bedside no complaints pain controlled   pt is in the sicu for monitoring     Vital Signs Last 24 Hrs  T(C): 36.1 (06 Jun 2023 08:00), Max: 36.7 (05 Jun 2023 16:00)  T(F): 96.9 (06 Jun 2023 08:00), Max: 98 (05 Jun 2023 16:00)  HR: 85 (06 Jun 2023 09:00) (79 - 95)  BP: 83/41 (06 Jun 2023 04:00) (83/41 - 119/54)  BP(mean): 58 (06 Jun 2023 04:00) (58 - 81)  RR: 21 (06 Jun 2023 09:00) (10 - 25)  SpO2: 100% (06 Jun 2023 09:00) (96% - 100%)                            7.7    6.37  )-----------( 111      ( 06 Jun 2023 05:50 )             22.6       left hip:    serous drainage proximal incision   ecchymosis present posterior thigh   staples in place small blisters present about prior bandage   new dressing applied   nvid

## 2023-06-06 NOTE — PHYSICAL THERAPY INITIAL EVALUATION ADULT - PERTINENT HX OF CURRENT PROBLEM, REHAB EVAL
78F TIA residual RLE weakness(2014), HTN, HLD, CAD s/p KILEY to mLAD stent (Jan 2021), AS s/p TAVR, DM, OA, compression fx admitted 6/3/23 as a trauma after a mechanical fall onto her left back and femur earlier today, and unable to ambulate afterwards.  Denies head trauma LOC or AC. Xray LLE +left femur fracture. Taken to OR 6/4/23 with Dr Haque for open reduction and internal fixation of left hip using intramedullary hip screw.

## 2023-06-06 NOTE — CHART NOTE - NSCHARTNOTEFT_GEN_A_CORE
SICU Transfer Note    RAFAEL NO  78y (1945)  [id number]      Transfer from: SICU  Transfer to: Surgery-    HPI   78F TIA residual RLE weakness(2014), HTN, HLD, CAD s/p KILEY to mLAD stent (Jan 2021), AS s/p TAVR, DM, OA, compression fx admitted 6/3/23 as a trauma after a mechanical fall onto her left back and femur earlier today, and unable to ambulate afterwards.  Denies head trauma LOC or AC. Xray LLE +left femur fracture. Taken to OR 6/4/23 with Dr Haque for open reduction and internal fixation of left hip using intramedullary hip screw.    OR Stats  OR Time:  12:37 to 13:49 (1 hour 12 min)  EBL: 150cc  IV Fluids: 1000cc  Blood Products: none  UOP: 0cc  Required phenylephrine pushesx2 and brief gtt intra op  Findings: Subtrochanteric fracture Left hip, fracture site puncture through fascia     Extubated successfully in PACU but became hypotensive, transiently responsive to 500cc 5%albumin bolus, 500cc LR bolus, and phenylephrine IVP given by anesthesia. After bolus, SBP 80s MAPs fluctuating between 50-60s, prompting SICU consult. Lactate 2.3 Additional 500cc x2 given.  Post op Hgb 7.3 from 10, PRBC x1 ordered.    SICU Course: Course complicated by hypotension and acute anemia. Patient received 3L of IVF in boluses for post-operative hypotension. Patient received a total of 3U PRBC during SICU admission (1U postoperatively, additional unit due to lack of response to first unit postoperatively, and third unit yesterday 6/5 for Hgb 7). Patient had episode of hypotension  yesterday; NICOM negative, CBC revealing Hgb 7.0 for which pt received transfusion. Hgb responded to 7.8. Since transfusion, patient has been maintaining SBP over 100. Today, patient was able to stand up with PT; PT recommending 4A. Physiatry examined patient yesterday; recommends as possible candidate for 4A. Social work aware, working with rehab team; final dispo pending. Pt's a-line was removed today and Bladimir was dc'd @ 10:30. TOV pending @ 16:30. Decision made to downgrade pt to floor while awaiting dispo for 4A; Dr. Lea bedside on rounds and approving plan.      PAST MEDICAL & SURGICAL HISTORY:  Hypertension      HLD (hyperlipidemia)      CAD (coronary artery disease)      OA (osteoarthritis)      Compression fracture of spine  2015 lumbar      Aortic stenosis      Compression fracture of spine      Type 2 diabetes mellitus  diet controlled      OAB (overactive bladder)      Transient ischemic attack (TIA)  Jan 2014- with residual weakness on right leg      Falls      Neuropathy      History of coronary artery stent placement  KILEY to mLAD (Jan 2021)      History of cataract surgery      H/O oral surgery      History of complete heart block  MDT dual chamber 3/11/2021        Allergies    Vicodin (Vomiting; Nausea)  codeine (Unknown)    Intolerances      MEDICATIONS  (STANDING):  acetaminophen     Tablet .. 650 milliGRAM(s) Oral every 6 hours  atorvastatin 20 milliGRAM(s) Oral at bedtime  calcium carbonate 1250 mG  + Vitamin D (OsCal 500 + D) 1 Tablet(s) Oral daily  enoxaparin Injectable 40 milliGRAM(s) SubCutaneous every 24 hours  gabapentin 300 milliGRAM(s) Oral every 8 hours  insulin lispro (ADMELOG) corrective regimen sliding scale   SubCutaneous Before meals and at bedtime  lactated ringers. 1000 milliLiter(s) (75 mL/Hr) IV Continuous <Continuous>  metoprolol tartrate 25 milliGRAM(s) Oral every 12 hours  multivitamin 1 Tablet(s) Oral daily  senna 2 Tablet(s) Oral at bedtime    MEDICATIONS  (PRN):  HYDROmorphone  Injectable 0.25 milliGRAM(s) IV Push every 4 hours PRN breakthrough pain  ketorolac   Injectable 15 milliGRAM(s) IV Push every 6 hours PRN Severe Pain (7 - 10)      Vital Signs Last 24 Hrs  T(C): 36.1 (06 Jun 2023 08:00), Max: 36.7 (05 Jun 2023 16:00)  T(F): 96.9 (06 Jun 2023 08:00), Max: 98 (05 Jun 2023 16:00)  HR: 80 (06 Jun 2023 10:00) (79 - 94)  BP: 98/49 (06 Jun 2023 10:00) (83/41 - 119/54)  BP(mean): 70 (06 Jun 2023 10:00) (58 - 81)  RR: 18 (06 Jun 2023 10:00) (10 - 25)  SpO2: 98% (06 Jun 2023 10:00) (96% - 100%)    Parameters below as of 06 Jun 2023 08:00  Patient On (Oxygen Delivery Method): room air      I&O's Summary    05 Jun 2023 07:01  -  06 Jun 2023 07:00  --------------------------------------------------------  IN: 2666 mL / OUT: 1155 mL / NET: 1511 mL    06 Jun 2023 07:01  -  06 Jun 2023 12:54  --------------------------------------------------------  IN: 537 mL / OUT: 210 mL / NET: 327 mL        LABS  LABS:                        7.7    6.37  )-----------( 111      ( 06 Jun 2023 05:50 )             22.6       06-05    136  |  108  |  31<H>  ----------------------------<  124<H>  4.2   |  19  |  0.9    Ca    7.3<L>      05 Jun 2023 21:10  Phos  1.8     06-05  Mg     2.0     06-05          CARDIAC MARKERS ( 04 Jun 2023 16:51 )  x     / <0.01 ng/mL / 357 U/L / x     / 4.6 ng/mL        Assessment & Plan  78F HTN/HLD, CAD, AS s/p TAVR, admitted 6/3/23 mechanical fall found to have left femur fracture now s/p ORIF 6/4/23 c/b post-op hypotension requiring SICU admission    NEURO:  #Acute pain post-op    -APAP prn    -Gabapentin 300mg TID  -toradol 15mg q 6   -dilaudid 0.25 q 4 PRN  #hx anxiety  -xanax dc'd     RESP:  #Oxygenation     -Extubated postoperatively   -Saturating well on RA    -incentive spirometry 1500     CARDS:   #Hypotension likely 2/2 hypovolemia post-op requiring intermittent peripheral levophed      -s/p 2u PRBCs and IVF resuscitation     - NICOM positive (6/4); NICOM negative (6/5)   #HTN    - holding home anti-HTN meds (lisinopril 10, amlodipine 5mg) while hypotensive   #CAD s/p PCI   #aortic stenosis s/p TAVR    - home ASA held until Hgb stable    -Holding home chlorthalidone 25mg     -Continue with home metoprolol 25mg BID   #HLD    - Lipitor 20mg daily (substituted for home Simvastatin 40)   #s/p PPM   - last EP interrogation 6/4:     [employer] DC PPM interrogated 6/4/23    Episode of VT x 8 seconds 5/28/23    Several brief episodes NSVT    Device functioning properly  Imaging    -Echo (6/4): EF 69%; G1DD    -Post-op EKG (6/4): Sinus rhythm with 1st degree AVB   Lab     -Post-op Trop negative x1    GI/NUTR:   #Diet    -DASH/TLC   #GI prophylaxis     -not indicated  #Bowel Regimen    -Senna   -last BM today 06/06    /RENAL:   #suspected hypovolemia    - Received 1L in OR; 500cc albumin + 500cc LR in PACU prior to SICU consult; additional 1L LR given in PACU = Total 3L given prior to arrival in ICU    - (6/5) Murcia placed as no void noted and hemodynamically fluctuant   -murcia dc'd, fu 16:30 TOV   #hx overactive bladder    - holding home Oxybutynin iso retention    Labs:          BUN/Cr- 31/0.7  -->,  31/0.9  -->          Electrolytes-Na 136 // K 4.2 // Mg 2.0 //  Phos 1.8 (06-05 @ 21:10)    HEME/ONC:   #Anemia     - Total transfusions: 3u PRBCs (6/4 + 6/5)   -last transfusion 6/5 afternoon  #DVT Prophylaxis     - lovenox     - SCD     - per ortho: restart  upon d/c      Labs: Hb/Hct:  7.8/23.3  -->,  7.7/22.6  -->                      Plts:  114  -->,  111  -->                 PTT/INR:      T&S Expires: 6/7/23    ID:  WBC- 6.63  --->>,  6.65  --->>,  5.89  --->>  Temp trend- 24hrs T(F): 97.9 (06-06 @ 00:00), Max: 98.4 (06-05 @ 04:00)  Antibiotics    -completed ceFAZolin x3 doses post-op   Cultures:    -None       ENDO:  #DM2 (controlled with diet)     -FS Q6h    -on ISS    MSK:  #L femur fracture s/p ORIF w/intramedullar hip screw    -Left Lower Extremity:  Weight Bearing As Tolerated     -Activity - Ambulate as Tolerated     -Ortho recs: d/c patient on 6 weeks of  daily if no contraindications   -PT/OT c/s in place; able to stand up with assistance today 06/06  LINES/DRAINS:  PIV    ADVANCED DIRECTIVES:  Full Code    HCP/Emergency Contact- Ehsan (spouse) [phone]    INDICATION FOR SICU/SDU: hypotension post-op    DISPO:  SICU   Social work and physiatry consulted for discharge planning; possible candidate for intensive hospital based rehab          Follow Up:  -16:00 CBC   -16:30 TOV  -20:00 Labs  -Final dispo/F/u transfer to ; PT recommending 4A, social work on board.        Signed out to: Nalini Franco PA-C   Date: 06/06/2023  Time: 13:30 SICU Transfer Note    RAFAEL NO  78y (1945)  [id number]      Transfer from: SICU  Transfer to: Surgery-    HPI   78F TIA residual RLE weakness(2014), HTN, HLD, CAD s/p KILEY to mLAD stent (Jan 2021), AS s/p TAVR, DM, OA, compression fx admitted 6/3/23 as a trauma after a mechanical fall onto her left back and femur earlier today, and unable to ambulate afterwards.  Denies head trauma LOC or AC. Xray LLE +left femur fracture. Taken to OR 6/4/23 with Dr Haque for open reduction and internal fixation of left hip using intramedullary hip screw.    OR Stats  OR Time:  12:37 to 13:49 (1 hour 12 min)  EBL: 150cc  IV Fluids: 1000cc  Blood Products: none  UOP: 0cc  Required phenylephrine pushesx2 and brief gtt intra op  Findings: Subtrochanteric fracture Left hip, fracture site puncture through fascia     Extubated successfully in PACU but became hypotensive, transiently responsive to 500cc 5%albumin bolus, 500cc LR bolus, and phenylephrine IVP given by anesthesia. After bolus, SBP 80s MAPs fluctuating between 50-60s, prompting SICU consult. Lactate 2.3 Additional 500cc x2 given.  Post op Hgb 7.3 from 10, PRBC x1 ordered.    SICU Course: Course complicated by hypotension and acute anemia. Patient received 3L of IVF in boluses for post-operative hypotension. Patient received a total of 3U PRBC during SICU admission (1U postoperatively, additional unit due to lack of response to first unit postoperatively, and third unit yesterday 6/5 for Hgb 7). Patient had episode of hypotension  yesterday; NICOM negative, CBC revealing Hgb 7.0 for which pt received transfusion. Hgb responded to 7.8. Since transfusion, patient has been maintaining SBP over 100. Today, patient was able to stand up with PT; PT recommending 4A. Physiatry examined patient yesterday; recommends as possible candidate for 4A. Social work aware, working with rehab team; final dispo pending. Pt's a-line was removed today and Bladimir was dc'd @ 10:30. TOV pending @ 16:30. Decision made to downgrade pt to floor while awaiting dispo for 4A; Dr. Lea bedside on rounds and approving plan.      PAST MEDICAL & SURGICAL HISTORY:  Hypertension      HLD (hyperlipidemia)      CAD (coronary artery disease)      OA (osteoarthritis)      Compression fracture of spine  2015 lumbar      Aortic stenosis      Compression fracture of spine      Type 2 diabetes mellitus  diet controlled      OAB (overactive bladder)      Transient ischemic attack (TIA)  Jan 2014- with residual weakness on right leg      Falls      Neuropathy      History of coronary artery stent placement  KILEY to mLAD (Jan 2021)      History of cataract surgery      H/O oral surgery      History of complete heart block  MDT dual chamber 3/11/2021        Allergies    Vicodin (Vomiting; Nausea)  codeine (Unknown)    Intolerances      MEDICATIONS  (STANDING):  acetaminophen     Tablet .. 650 milliGRAM(s) Oral every 6 hours  atorvastatin 20 milliGRAM(s) Oral at bedtime  calcium carbonate 1250 mG  + Vitamin D (OsCal 500 + D) 1 Tablet(s) Oral daily  enoxaparin Injectable 40 milliGRAM(s) SubCutaneous every 24 hours  gabapentin 300 milliGRAM(s) Oral every 8 hours  insulin lispro (ADMELOG) corrective regimen sliding scale   SubCutaneous Before meals and at bedtime  lactated ringers. 1000 milliLiter(s) (75 mL/Hr) IV Continuous <Continuous>  metoprolol tartrate 25 milliGRAM(s) Oral every 12 hours  multivitamin 1 Tablet(s) Oral daily  senna 2 Tablet(s) Oral at bedtime    MEDICATIONS  (PRN):  HYDROmorphone  Injectable 0.25 milliGRAM(s) IV Push every 4 hours PRN breakthrough pain  ketorolac   Injectable 15 milliGRAM(s) IV Push every 6 hours PRN Severe Pain (7 - 10)      Vital Signs Last 24 Hrs  T(C): 36.1 (06 Jun 2023 08:00), Max: 36.7 (05 Jun 2023 16:00)  T(F): 96.9 (06 Jun 2023 08:00), Max: 98 (05 Jun 2023 16:00)  HR: 80 (06 Jun 2023 10:00) (79 - 94)  BP: 98/49 (06 Jun 2023 10:00) (83/41 - 119/54)  BP(mean): 70 (06 Jun 2023 10:00) (58 - 81)  RR: 18 (06 Jun 2023 10:00) (10 - 25)  SpO2: 98% (06 Jun 2023 10:00) (96% - 100%)    Parameters below as of 06 Jun 2023 08:00  Patient On (Oxygen Delivery Method): room air      I&O's Summary    05 Jun 2023 07:01  -  06 Jun 2023 07:00  --------------------------------------------------------  IN: 2666 mL / OUT: 1155 mL / NET: 1511 mL    06 Jun 2023 07:01  -  06 Jun 2023 12:54  --------------------------------------------------------  IN: 537 mL / OUT: 210 mL / NET: 327 mL        LABS  LABS:                        7.7    6.37  )-----------( 111      ( 06 Jun 2023 05:50 )             22.6       06-05    136  |  108  |  31<H>  ----------------------------<  124<H>  4.2   |  19  |  0.9    Ca    7.3<L>      05 Jun 2023 21:10  Phos  1.8     06-05  Mg     2.0     06-05          CARDIAC MARKERS ( 04 Jun 2023 16:51 )  x     / <0.01 ng/mL / 357 U/L / x     / 4.6 ng/mL        Assessment & Plan  78F HTN/HLD, CAD, AS s/p TAVR, admitted 6/3/23 mechanical fall found to have left femur fracture now s/p ORIF 6/4/23 c/b post-op hypotension requiring SICU admission    NEURO:  #Acute pain post-op    -APAP prn    -Gabapentin 300mg TID  -toradol 15mg q 6   -dilaudid 0.25 q 4 PRN  #hx anxiety  -xanax dc'd     RESP:  #Oxygenation     -Extubated postoperatively   -Saturating well on RA    -incentive spirometry 1500     CARDS:   #Hypotension likely 2/2 hypovolemia post-op requiring intermittent peripheral levophed      -s/p 2u PRBCs and IVF resuscitation     - NICOM positive (6/4); NICOM negative (6/5)   #HTN    - holding home anti-HTN meds (lisinopril 10, amlodipine 5mg) while hypotensive   #CAD s/p PCI   #aortic stenosis s/p TAVR    - home ASA held until Hgb stable    -Holding home chlorthalidone 25mg     -Continue with home metoprolol 25mg BID   #HLD    - Lipitor 20mg daily (substituted for home Simvastatin 40)   #s/p PPM   - last EP interrogation 6/4:     [employer] DC PPM interrogated 6/4/23    Episode of VT x 8 seconds 5/28/23    Several brief episodes NSVT    Device functioning properly  Imaging    -Echo (6/4): EF 69%; G1DD    -Post-op EKG (6/4): Sinus rhythm with 1st degree AVB   Lab     -Post-op Trop negative x1    GI/NUTR:   #Diet    -DASH/TLC   #GI prophylaxis     -not indicated  #Bowel Regimen    -Senna   -last BM today 06/06    /RENAL:   #suspected hypovolemia    - Received 1L in OR; 500cc albumin + 500cc LR in PACU prior to SICU consult; additional 1L LR given in PACU = Total 3L given prior to arrival in ICU    - (6/5) Murcia placed as no void noted and hemodynamically fluctuant   -murcia dc'd, fu 16:30 TOV   #hx overactive bladder    - holding home Oxybutynin iso retention    Labs:          BUN/Cr- 31/0.7  -->,  31/0.9  -->          Electrolytes-Na 136 // K 4.2 // Mg 2.0 //  Phos 1.8 (06-05 @ 21:10)    HEME/ONC:   #Anemia     - Total transfusions: 3u PRBCs (6/4 + 6/5)   -last transfusion 6/5 afternoon  #DVT Prophylaxis     - lovenox     - SCD     - per ortho: restart  upon d/c      Labs: Hb/Hct:  7.8/23.3  -->,  7.7/22.6  -->                      Plts:  114  -->,  111  -->                 PTT/INR:      T&S Expires: 6/7/23    ID:  WBC- 6.63  --->>,  6.65  --->>,  5.89  --->>  Temp trend- 24hrs T(F): 97.9 (06-06 @ 00:00), Max: 98.4 (06-05 @ 04:00)  Antibiotics    -completed ceFAZolin x3 doses post-op   Cultures:    -None       ENDO:  #DM2 (controlled with diet)     -FS Q6h    -on ISS    MSK:  #L femur fracture s/p ORIF w/intramedullar hip screw    -Left Lower Extremity:  Weight Bearing As Tolerated     -Activity - Ambulate as Tolerated     -Ortho recs: d/c patient on 6 weeks of  daily if no contraindications   -PT/OT c/s in place; able to stand up with assistance today 06/06  LINES/DRAINS:  PIV    ADVANCED DIRECTIVES:  Full Code    HCP/Emergency Contact- Ehsan (spouse) [phone]    INDICATION FOR SICU/SDU: hypotension post-op    DISPO:  DG   Social work and physiatry consulted for discharge planning; possible candidate for intensive hospital based rehab          Follow Up:  -16:00 CBC   -16:30 TOV  -20:00 Labs  -Final dispo/F/u transfer to ; PT recommending 4A, social work on board.        Signed out to: aNlini Franco PA-C   Date: 06/06/2023  Time: 13:30

## 2023-06-06 NOTE — PHYSICAL THERAPY INITIAL EVALUATION ADULT - GENERAL OBSERVATIONS, REHAB EVAL
10:20-11:00 Pt encountered in bed, Radial Rosa locked, +tele, VSS, Pt was cooperative and motivated, Pt tolerated sitting at EOB for 10 minutes, pt had fair tolerance for standing due to LLE pain. Pt would benefit from inpatient rehab.

## 2023-06-06 NOTE — PROGRESS NOTE ADULT - SUBJECTIVE AND OBJECTIVE BOX
RAFAEL NO   MRN-[id number]  78y ([birth year])F      Admit Date/LOS: 06-03 (1d)  Indication for SDU/SICU: hypotension post-op  OR 6/4/23 (Day #)        ----------------------------------------------------------------------------------------------------------  HPI   78F TIA residual RLE weakness(2014), HTN, HLD, CAD s/p KILEY to mLAD stent (Jan 2021), AS s/p TAVR, DM, OA, compression fx admitted 6/3/23 as a trauma after a mechanical fall onto her left back and femur earlier today, and unable to ambulate afterwards.  Denies head trauma LOC or AC. Xray LLE +left femur fracture. Taken to OR 6/4/23 with Dr Haque for open reduction and internal fixation of left hip using intramedullary hip screw.    OR Stats  OR Time:  12:37 to 13:49 (1 hour 12 min)  EBL: 150cc  IV Fluids: 1000cc  Blood Products: none  UOP: 0cc  Required phenylephrine pushesx2 and brief gtt intra op  Findings: Subtrochanteric fracture Left hip, fracture site puncture through fascia     Extubated successfully in PACU but became hypotensive, transiently responsive to 500cc 5%albumin bolus, 500cc LR bolus, and phenylephrine IVP given by anesthesia. After bolus, SBP 80s MAPs fluctuating between 50-60s, prompting SICU consult. Lactate 2.3 Additional 500cc x2 given. (Total fluid given so far 3L) Post op Hgb 7.3 from 10, PRBC x1 ordered     24 Hour Events  6/5  NIGHT  -started LR @75  -post-transfusion Hgb 7.0 --> 7.8  -nontachycardic, SBP 90s-100s, MAP 58-65 when sleeping, higher when awake, asymptomatic, DP signals palpable, a-line positional  -Phos 1.8 --> 15 NaPhos    DAY   - d/c xanax (confirmed with patient that xanax is not a home medication)   - Added toradol 15mg q6 for pain control PRN (severe pain)   - Adv to regular diet (DASH/TLC)   - Plan to DC murcia once pt ambulatory    - Switch SQH to lovenox   - hypotensive with MAPs ~55 on a-line, 70s on cuff, NICOM neg, f/u 16:00 CBC   - Repeat Hgb 7.0, 1u pRBC ordered      [X] A ten-point review of systems was otherwise negative except as noted above.  [  ] Due to altered mental status/intubation, subjective information was not attained from the patient. History was obtained, to the extent possible, from review of the chart and collateral sources of information.    ---------------------------------------------------------------------------------------------------------- RAFAEL NO   MRN-[id number]  78y ([birth year])F      Admit Date/LOS: 06-03 (1d)  Indication for SDU/SICU: hypotension post-op  OR 6/4/23         ----------------------------------------------------------------------------------------------------------  HPI   78F TIA residual RLE weakness(2014), HTN, HLD, CAD s/p KILEY to mLAD stent (Jan 2021), AS s/p TAVR, DM, OA, compression fx admitted 6/3/23 as a trauma after a mechanical fall onto her left back and femur earlier today, and unable to ambulate afterwards.  Denies head trauma LOC or AC. Xray LLE +left femur fracture. Taken to OR 6/4/23 with Dr Haque for open reduction and internal fixation of left hip using intramedullary hip screw.    OR Stats  OR Time:  12:37 to 13:49 (1 hour 12 min)  EBL: 150cc  IV Fluids: 1000cc  Blood Products: none  UOP: 0cc  Required phenylephrine pushesx2 and brief gtt intra op  Findings: Subtrochanteric fracture Left hip, fracture site puncture through fascia     Extubated successfully in PACU but became hypotensive, transiently responsive to 500cc 5%albumin bolus, 500cc LR bolus, and phenylephrine IVP given by anesthesia. After bolus, SBP 80s MAPs fluctuating between 50-60s, prompting SICU consult. Lactate 2.3 Additional 500cc x2 given. (Total fluid given so far 3L) Post op Hgb 7.3 from 10, PRBC x1 ordered     24 Hour Events  6/5  NIGHT  -started LR @75  -post-transfusion Hgb 7.0 --> 7.8  -nontachycardic, SBP 90s-100s, MAP 58-65 when sleeping, higher when awake, asymptomatic, DP signals palpable, a-line positional  -Phos 1.8 --> 15 NaPhos    DAY   - d/c xanax (confirmed with patient that xanax is not a home medication)   - Added toradol 15mg q6 for pain control PRN (severe pain)   - Adv to regular diet (DASH/TLC)   - Plan to DC murcia once pt ambulatory    - Switch SQH to lovenox   - hypotensive with MAPs ~55 on a-line, 70s on cuff, NICOM neg, f/u 16:00 CBC   - Repeat Hgb 7.0, 1u pRBC ordered      [X] A ten-point review of systems was otherwise negative except as noted above.  [  ] Due to altered mental status/intubation, subjective information was not attained from the patient. History was obtained, to the extent possible, from review of the chart and collateral sources of information.    ----------------------------------------------------------------------------------------------------------  Daily Height in cm: 172.72 (05 Jun 2023 15:02)    Daily     Diet, Regular:   Consistent Carbohydrate Evening Snack  DASH/TLC Sodium & Cholesterol Restricted (06-05-23 @ 09:12)      CURRENT MEDS:  Neurologic Medications  acetaminophen     Tablet .. 650 milliGRAM(s) Oral every 6 hours  gabapentin 300 milliGRAM(s) Oral every 8 hours  HYDROmorphone  Injectable 0.25 milliGRAM(s) IV Push every 4 hours PRN breakthrough pain  ketorolac   Injectable 15 milliGRAM(s) IV Push every 6 hours PRN Severe Pain (7 - 10)    Respiratory Medications    Cardiovascular Medications  metoprolol tartrate 25 milliGRAM(s) Oral every 12 hours    Gastrointestinal Medications  calcium carbonate 1250 mG  + Vitamin D (OsCal 500 + D) 1 Tablet(s) Oral daily  lactated ringers. 1000 milliLiter(s) IV Continuous <Continuous>  multivitamin 1 Tablet(s) Oral daily  senna 2 Tablet(s) Oral at bedtime    Genitourinary Medications    Hematologic/Oncologic Medications  enoxaparin Injectable 40 milliGRAM(s) SubCutaneous every 24 hours    Antimicrobial/Immunologic Medications    Endocrine/Metabolic Medications  atorvastatin 20 milliGRAM(s) Oral at bedtime  insulin lispro (ADMELOG) corrective regimen sliding scale   SubCutaneous Before meals and at bedtime    Topical/Other Medications      ICU Vital Signs Last 24 Hrs  T(C): 36.1 (06 Jun 2023 08:00), Max: 36.7 (05 Jun 2023 16:00)  T(F): 96.9 (06 Jun 2023 08:00), Max: 98 (05 Jun 2023 16:00)  HR: 80 (06 Jun 2023 08:00) (79 - 95)  BP: 83/41 (06 Jun 2023 04:00) (83/41 - 119/54)  BP(mean): 58 (06 Jun 2023 04:00) (58 - 81)  ABP: 129/59 (06 Jun 2023 08:00) (86/33 - 148/68)  ABP(mean): 87 (06 Jun 2023 08:00) (51 - 99)  RR: 15 (06 Jun 2023 08:00) (10 - 25)  SpO2: 98% (06 Jun 2023 08:00) (96% - 100%)    O2 Parameters below as of 06 Jun 2023 08:00  Patient On (Oxygen Delivery Method): room air                    I&O's Summary    05 Jun 2023 07:01  -  06 Jun 2023 07:00  --------------------------------------------------------  IN: 2666 mL / OUT: 1155 mL / NET: 1511 mL    06 Jun 2023 07:01  -  06 Jun 2023 08:18  --------------------------------------------------------  IN: 75 mL / OUT: 75 mL / NET: 0 mL      I&O's Detail    05 Jun 2023 07:01  -  06 Jun 2023 07:00  --------------------------------------------------------  IN:    IV PiggyBack: 250 mL    Lactated Ringers: 750 mL    Oral Fluid: 150 mL    PRBCs (Packed Red Blood Cells): 316 mL    sodium chloride 0.9%: 1200 mL  Total IN: 2666 mL    OUT:    Indwelling Catheter - Urethral (mL): 1155 mL  Total OUT: 1155 mL    Total NET: 1511 mL      06 Jun 2023 07:01  -  06 Jun 2023 08:18  --------------------------------------------------------  IN:    Lactated Ringers: 75 mL  Total IN: 75 mL    OUT:    Indwelling Catheter - Urethral (mL): 75 mL  Total OUT: 75 mL    Total NET: 0 mL          PHYSICAL EXAM:    General/Neuro: A&O x 3, no focal deficits, GREENE    Lungs: Cear to auscultation, Normal expansion/effort. 1500 IS.     Cardiovascular : S1, S2.  Regular rate and rhythm.      GI: Abdomen soft, Non-tender, Non-distended.    Gastrostomy / Jejunostomy tube in place.  Nasogastric tube in place.  Colostomy / Ileostomy.    Wound:    Extremities: Extremities warm, pink, well-perfused. L thigh ecchymoses noted. Femur & hip incision sites covered with dressing, c/d/i.     Derm: Good skin turgor. L thigh ecchymoses noted.     :       Murcia catheter in place.      LABS:  CAPILLARY BLOOD GLUCOSE      POCT Blood Glucose.: 120 mg/dL (06 Jun 2023 05:25)  POCT Blood Glucose.: 136 mg/dL (05 Jun 2023 23:55)  POCT Blood Glucose.: 189 mg/dL (05 Jun 2023 16:23)  POCT Blood Glucose.: 189 mg/dL (05 Jun 2023 11:08)                          7.7    6.37  )-----------( 111      ( 06 Jun 2023 05:50 )             22.6       06-05    136  |  108  |  31<H>  ----------------------------<  124<H>  4.2   |  19  |  0.9    Ca    7.3<L>      05 Jun 2023 21:10  Phos  1.8     06-05  Mg     2.0     06-05          CARDIAC MARKERS ( 04 Jun 2023 16:51 )  x     / <0.01 ng/mL / 357 U/L / x     / 4.6 ng/mL

## 2023-06-07 LAB
ANION GAP SERPL CALC-SCNC: 8 MMOL/L — SIGNIFICANT CHANGE UP (ref 7–14)
ANION GAP SERPL CALC-SCNC: 9 MMOL/L — SIGNIFICANT CHANGE UP (ref 7–14)
BASOPHILS # BLD AUTO: 0.03 K/UL — SIGNIFICANT CHANGE UP (ref 0–0.2)
BASOPHILS NFR BLD AUTO: 0.4 % — SIGNIFICANT CHANGE UP (ref 0–1)
BLD GP AB SCN SERPL QL: SIGNIFICANT CHANGE UP
BUN SERPL-MCNC: 27 MG/DL — HIGH (ref 10–20)
BUN SERPL-MCNC: 29 MG/DL — HIGH (ref 10–20)
CALCIUM SERPL-MCNC: 7.5 MG/DL — LOW (ref 8.4–10.5)
CALCIUM SERPL-MCNC: 7.7 MG/DL — LOW (ref 8.4–10.5)
CHLORIDE SERPL-SCNC: 102 MMOL/L — SIGNIFICANT CHANGE UP (ref 98–110)
CHLORIDE SERPL-SCNC: 106 MMOL/L — SIGNIFICANT CHANGE UP (ref 98–110)
CHOLEST SERPL-MCNC: 110 MG/DL — SIGNIFICANT CHANGE UP
CO2 SERPL-SCNC: 22 MMOL/L — SIGNIFICANT CHANGE UP (ref 17–32)
CO2 SERPL-SCNC: 23 MMOL/L — SIGNIFICANT CHANGE UP (ref 17–32)
CREAT SERPL-MCNC: 0.7 MG/DL — SIGNIFICANT CHANGE UP (ref 0.7–1.5)
CREAT SERPL-MCNC: 1 MG/DL — SIGNIFICANT CHANGE UP (ref 0.7–1.5)
EGFR: 57 ML/MIN/1.73M2 — LOW
EGFR: 88 ML/MIN/1.73M2 — SIGNIFICANT CHANGE UP
EOSINOPHIL # BLD AUTO: 0.28 K/UL — SIGNIFICANT CHANGE UP (ref 0–0.7)
EOSINOPHIL NFR BLD AUTO: 3.7 % — SIGNIFICANT CHANGE UP (ref 0–8)
GLUCOSE BLDC GLUCOMTR-MCNC: 125 MG/DL — HIGH (ref 70–99)
GLUCOSE BLDC GLUCOMTR-MCNC: 132 MG/DL — HIGH (ref 70–99)
GLUCOSE BLDC GLUCOMTR-MCNC: 135 MG/DL — HIGH (ref 70–99)
GLUCOSE BLDC GLUCOMTR-MCNC: 172 MG/DL — HIGH (ref 70–99)
GLUCOSE SERPL-MCNC: 117 MG/DL — HIGH (ref 70–99)
GLUCOSE SERPL-MCNC: 120 MG/DL — HIGH (ref 70–99)
HCT VFR BLD CALC: 22.9 % — LOW (ref 37–47)
HCT VFR BLD CALC: 23.2 % — LOW (ref 37–47)
HDLC SERPL-MCNC: 38 MG/DL — LOW
HGB BLD-MCNC: 7.7 G/DL — LOW (ref 12–16)
HGB BLD-MCNC: 7.7 G/DL — LOW (ref 12–16)
IMM GRANULOCYTES NFR BLD AUTO: 1.1 % — HIGH (ref 0.1–0.3)
LIPID PNL WITH DIRECT LDL SERPL: 59 MG/DL — SIGNIFICANT CHANGE UP
LYMPHOCYTES # BLD AUTO: 1.56 K/UL — SIGNIFICANT CHANGE UP (ref 1.2–3.4)
LYMPHOCYTES # BLD AUTO: 20.6 % — SIGNIFICANT CHANGE UP (ref 20.5–51.1)
MAGNESIUM SERPL-MCNC: 1.9 MG/DL — SIGNIFICANT CHANGE UP (ref 1.8–2.4)
MAGNESIUM SERPL-MCNC: 2 MG/DL — SIGNIFICANT CHANGE UP (ref 1.8–2.4)
MCHC RBC-ENTMCNC: 29.7 PG — SIGNIFICANT CHANGE UP (ref 27–31)
MCHC RBC-ENTMCNC: 30.7 PG — SIGNIFICANT CHANGE UP (ref 27–31)
MCHC RBC-ENTMCNC: 33.2 G/DL — SIGNIFICANT CHANGE UP (ref 32–37)
MCHC RBC-ENTMCNC: 33.6 G/DL — SIGNIFICANT CHANGE UP (ref 32–37)
MCV RBC AUTO: 89.6 FL — SIGNIFICANT CHANGE UP (ref 81–99)
MCV RBC AUTO: 91.2 FL — SIGNIFICANT CHANGE UP (ref 81–99)
MONOCYTES # BLD AUTO: 0.73 K/UL — HIGH (ref 0.1–0.6)
MONOCYTES NFR BLD AUTO: 9.6 % — HIGH (ref 1.7–9.3)
NEUTROPHILS # BLD AUTO: 4.91 K/UL — SIGNIFICANT CHANGE UP (ref 1.4–6.5)
NEUTROPHILS NFR BLD AUTO: 64.6 % — SIGNIFICANT CHANGE UP (ref 42.2–75.2)
NON HDL CHOLESTEROL: 72 MG/DL — SIGNIFICANT CHANGE UP
NRBC # BLD: 0 /100 WBCS — SIGNIFICANT CHANGE UP (ref 0–0)
NRBC # BLD: 0 /100 WBCS — SIGNIFICANT CHANGE UP (ref 0–0)
PHOSPHATE SERPL-MCNC: 2.2 MG/DL — SIGNIFICANT CHANGE UP (ref 2.1–4.9)
PHOSPHATE SERPL-MCNC: 2.8 MG/DL — SIGNIFICANT CHANGE UP (ref 2.1–4.9)
PLATELET # BLD AUTO: 125 K/UL — LOW (ref 130–400)
PLATELET # BLD AUTO: 164 K/UL — SIGNIFICANT CHANGE UP (ref 130–400)
PMV BLD: 10.5 FL — HIGH (ref 7.4–10.4)
PMV BLD: 10.6 FL — HIGH (ref 7.4–10.4)
POTASSIUM SERPL-MCNC: 4 MMOL/L — SIGNIFICANT CHANGE UP (ref 3.5–5)
POTASSIUM SERPL-MCNC: 4.1 MMOL/L — SIGNIFICANT CHANGE UP (ref 3.5–5)
POTASSIUM SERPL-SCNC: 4 MMOL/L — SIGNIFICANT CHANGE UP (ref 3.5–5)
POTASSIUM SERPL-SCNC: 4.1 MMOL/L — SIGNIFICANT CHANGE UP (ref 3.5–5)
RBC # BLD: 2.51 M/UL — LOW (ref 4.2–5.4)
RBC # BLD: 2.59 M/UL — LOW (ref 4.2–5.4)
RBC # FLD: 15.6 % — HIGH (ref 11.5–14.5)
RBC # FLD: 16.1 % — HIGH (ref 11.5–14.5)
SODIUM SERPL-SCNC: 133 MMOL/L — LOW (ref 135–146)
SODIUM SERPL-SCNC: 137 MMOL/L — SIGNIFICANT CHANGE UP (ref 135–146)
TRIGL SERPL-MCNC: 64 MG/DL — SIGNIFICANT CHANGE UP
WBC # BLD: 6.69 K/UL — SIGNIFICANT CHANGE UP (ref 4.8–10.8)
WBC # BLD: 7.59 K/UL — SIGNIFICANT CHANGE UP (ref 4.8–10.8)
WBC # FLD AUTO: 6.69 K/UL — SIGNIFICANT CHANGE UP (ref 4.8–10.8)
WBC # FLD AUTO: 7.59 K/UL — SIGNIFICANT CHANGE UP (ref 4.8–10.8)

## 2023-06-07 PROCEDURE — 99291 CRITICAL CARE FIRST HOUR: CPT | Mod: 25,24

## 2023-06-07 PROCEDURE — 71045 X-RAY EXAM CHEST 1 VIEW: CPT | Mod: 26

## 2023-06-07 RX ORDER — CHLORHEXIDINE GLUCONATE 213 G/1000ML
1 SOLUTION TOPICAL
Refills: 0 | Status: DISCONTINUED | OUTPATIENT
Start: 2023-06-07 | End: 2023-06-14

## 2023-06-07 RX ORDER — KETOROLAC TROMETHAMINE 30 MG/ML
15 SYRINGE (ML) INJECTION ONCE
Refills: 0 | Status: DISCONTINUED | OUTPATIENT
Start: 2023-06-07 | End: 2023-06-07

## 2023-06-07 RX ORDER — MAGNESIUM SULFATE 500 MG/ML
1 VIAL (ML) INJECTION ONCE
Refills: 0 | Status: COMPLETED | OUTPATIENT
Start: 2023-06-07 | End: 2023-06-07

## 2023-06-07 RX ORDER — ASPIRIN/CALCIUM CARB/MAGNESIUM 324 MG
81 TABLET ORAL DAILY
Refills: 0 | Status: DISCONTINUED | OUTPATIENT
Start: 2023-06-07 | End: 2023-06-08

## 2023-06-07 RX ADMIN — Medication 650 MILLIGRAM(S): at 12:40

## 2023-06-07 RX ADMIN — Medication 25 GRAM(S): at 01:48

## 2023-06-07 RX ADMIN — Medication 1 TABLET(S): at 12:09

## 2023-06-07 RX ADMIN — Medication 650 MILLIGRAM(S): at 12:09

## 2023-06-07 RX ADMIN — GABAPENTIN 300 MILLIGRAM(S): 400 CAPSULE ORAL at 05:42

## 2023-06-07 RX ADMIN — ATORVASTATIN CALCIUM 20 MILLIGRAM(S): 80 TABLET, FILM COATED ORAL at 21:11

## 2023-06-07 RX ADMIN — Medication 15 MILLIGRAM(S): at 13:10

## 2023-06-07 RX ADMIN — Medication 25 MILLIGRAM(S): at 17:26

## 2023-06-07 RX ADMIN — Medication 25 MILLIGRAM(S): at 05:42

## 2023-06-07 RX ADMIN — GABAPENTIN 300 MILLIGRAM(S): 400 CAPSULE ORAL at 13:15

## 2023-06-07 RX ADMIN — Medication 650 MILLIGRAM(S): at 17:27

## 2023-06-07 RX ADMIN — Medication 15 MILLIGRAM(S): at 13:57

## 2023-06-07 RX ADMIN — ENOXAPARIN SODIUM 40 MILLIGRAM(S): 100 INJECTION SUBCUTANEOUS at 09:30

## 2023-06-07 RX ADMIN — Medication 650 MILLIGRAM(S): at 05:42

## 2023-06-07 RX ADMIN — Medication 85 MILLIMOLE(S): at 05:41

## 2023-06-07 RX ADMIN — Medication 15 MILLIGRAM(S): at 14:43

## 2023-06-07 RX ADMIN — Medication 15 MILLIGRAM(S): at 15:10

## 2023-06-07 RX ADMIN — Medication 81 MILLIGRAM(S): at 12:09

## 2023-06-07 RX ADMIN — Medication 2: at 12:06

## 2023-06-07 RX ADMIN — Medication 15 MILLIGRAM(S): at 21:09

## 2023-06-07 RX ADMIN — CHLORHEXIDINE GLUCONATE 1 APPLICATION(S): 213 SOLUTION TOPICAL at 21:11

## 2023-06-07 RX ADMIN — GABAPENTIN 300 MILLIGRAM(S): 400 CAPSULE ORAL at 21:11

## 2023-06-07 NOTE — PROGRESS NOTE ADULT - SUBJECTIVE AND OBJECTIVE BOX
RAFAEL NO   MRN-[id number]  78y ([birth year])F      Admit Date/LOS: 06-03 (1d)  Indication for SDU/SICU: hypotension post-op  OR 6/4/23 (Day #)        ----------------------------------------------------------------------------------------------------------  HPI   78F TIA residual RLE weakness(2014), HTN, HLD, CAD s/p KILEY to mLAD stent (Jan 2021), AS s/p TAVR, DM, OA, compression fx admitted 6/3/23 as a trauma after a mechanical fall onto her left back and femur earlier today, and unable to ambulate afterwards.  Denies head trauma LOC or AC. Xray LLE +left femur fracture. Taken to OR 6/4/23 with Dr Haque for open reduction and internal fixation of left hip using intramedullary hip screw.    OR Stats  OR Time:  12:37 to 13:49 (1 hour 12 min)  EBL: 150cc  IV Fluids: 1000cc  Blood Products: none  UOP: 0cc  Required phenylephrine pushesx2 and brief gtt intra op  Findings: Subtrochanteric fracture Left hip, fracture site puncture through fascia     Extubated successfully in PACU but became hypotensive, transiently responsive to 500cc 5%albumin bolus, 500cc LR bolus, and phenylephrine IVP given by anesthesia. After bolus, SBP 80s MAPs fluctuating between 50-60s, prompting SICU consult. Lactate 2.3 Additional 500cc x2 given. (Total fluid given so far 3L) Post op Hgb 7.3 from 10, PRBC x1 ordered     24 Hour Events  6/6  NIGHT  - PM rounds: LLE mildly edematous, soft, dressings c/d/i; motor/sensation intact, palpable DP/PT   - Lasix 10 -> 650cc within first 3hrs   - Hb 7.3 -> 7.7   - d/c senna   - 1g Mg, 30 NaPhos     DAY   - 1500 IS, normotensive   - DC murcia @10:30, passed TOV 150cc voided    - Plan to DC kike if BP stable --> dc'd    - stood up with PT   - 11AM CBC 7.3; asx, diuresis with IV lasix 10, consider restarting diuretic nguyen if BP stable   - f/u regarding transfer to ; social work aware      [X] A ten-point review of systems was otherwise negative except as noted above.  [  ] Due to altered mental status/intubation, subjective information was not attained from the patient. History was obtained, to the extent possible, from review of the chart and collateral sources of information.    ----------------------------------------------------------------------------------------------------------   RAFAEL NO   MRN-[id number]  78y ([birth year])F      Admit Date/LOS: 06-03 (1d)  Indication for SDU/SICU: hypotension post-op  OR 6/4/23 (Day #)        ----------------------------------------------------------------------------------------------------------  HPI   78F TIA residual RLE weakness(2014), HTN, HLD, CAD s/p KILEY to mLAD stent (Jan 2021), AS s/p TAVR, DM, OA, compression fx admitted 6/3/23 as a trauma after a mechanical fall onto her left back and femur earlier today, and unable to ambulate afterwards.  Denies head trauma LOC or AC. Xray LLE +left femur fracture. Taken to OR 6/4/23 with Dr Haque for open reduction and internal fixation of left hip using intramedullary hip screw.    OR Stats  OR Time:  12:37 to 13:49 (1 hour 12 min)  EBL: 150cc  IV Fluids: 1000cc  Blood Products: none  UOP: 0cc  Required phenylephrine pushesx2 and brief gtt intra op  Findings: Subtrochanteric fracture Left hip, fracture site puncture through fascia     Extubated successfully in PACU but became hypotensive, transiently responsive to 500cc 5%albumin bolus, 500cc LR bolus, and phenylephrine IVP given by anesthesia. After bolus, SBP 80s MAPs fluctuating between 50-60s, prompting SICU consult. Lactate 2.3 Additional 500cc x2 given. (Total fluid given so far 3L) Post op Hgb 7.3 from 10, PRBC x1 ordered     24 Hour Events  6/6  NIGHT  - PM rounds: LLE mildly edematous, soft, dressings c/d/i; motor/sensation intact, palpable DP/PT   - Lasix 10 -> 650cc within first 3hrs   - Hb 7.3 -> 7.7   - d/c senna   - 1g Mg, 30 NaPhos     DAY   - 1500 IS, normotensive   - DC murcia @10:30, passed TOV 150cc voided    - Plan to DC kike if BP stable --> dc'd    - stood up with PT   - 11AM CBC 7.3; asx, diuresis with IV lasix 10, consider restarting diuretic nguyen if BP stable   - f/u regarding transfer to ; social work aware      [X] A ten-point review of systems was otherwise negative except as noted above.  [  ] Due to altered mental status/intubation, subjective information was not attained from the patient. History was obtained, to the extent possible, from review of the chart and collateral sources of information.    ----------------------------------------------------------------------------------------------------------  Daily     Daily     Diet, Regular:   Consistent Carbohydrate Evening Snack  DASH/TLC Sodium & Cholesterol Restricted (06-05-23 @ 09:12)      CURRENT MEDS:  Neurologic Medications  acetaminophen     Tablet .. 650 milliGRAM(s) Oral every 6 hours  gabapentin 300 milliGRAM(s) Oral every 8 hours  HYDROmorphone  Injectable 0.25 milliGRAM(s) IV Push every 4 hours PRN breakthrough pain  ketorolac   Injectable 15 milliGRAM(s) IV Push every 6 hours PRN Severe Pain (7 - 10)    Respiratory Medications    Cardiovascular Medications  metoprolol tartrate 25 milliGRAM(s) Oral every 12 hours    Gastrointestinal Medications  calcium carbonate 1250 mG  + Vitamin D (OsCal 500 + D) 1 Tablet(s) Oral daily  multivitamin 1 Tablet(s) Oral daily    Genitourinary Medications    Hematologic/Oncologic Medications  enoxaparin Injectable 40 milliGRAM(s) SubCutaneous every 24 hours    Antimicrobial/Immunologic Medications    Endocrine/Metabolic Medications  atorvastatin 20 milliGRAM(s) Oral at bedtime  insulin lispro (ADMELOG) corrective regimen sliding scale   SubCutaneous Before meals and at bedtime    Topical/Other Medications      ICU Vital Signs Last 24 Hrs  T(C): 36.4 (06 Jun 2023 20:00), Max: 36.4 (06 Jun 2023 20:00)  T(F): 97.6 (06 Jun 2023 20:00), Max: 97.6 (06 Jun 2023 20:00)  HR: 69 (07 Jun 2023 07:00) (66 - 85)  BP: 145/78 (07 Jun 2023 07:00) (89/67 - 146/61)  BP(mean): 104 (07 Jun 2023 07:00) (66 - 104)  ABP: 144/64 (06 Jun 2023 12:00) (103/40 - 144/64)  ABP(mean): 91 (06 Jun 2023 12:00) (63 - 91)  RR: 12 (07 Jun 2023 07:00) (10 - 25)  SpO2: 100% (07 Jun 2023 07:00) (97% - 100%)    O2 Parameters below as of 06 Jun 2023 20:00  Patient On (Oxygen Delivery Method): room air            Adult Advanced Hemodynamics Last 24 Hrs  CVP(mm Hg): --  CVP(cm H2O): --  CO: --  CI: --  PA: --  PA(mean): --  PCWP: --  SVR: --  SVRI: --  PVR: --  PVRI: --          I&O's Summary    06 Jun 2023 07:01  -  07 Jun 2023 07:00  --------------------------------------------------------  IN: 887 mL / OUT: 1060 mL / NET: -173 mL      I&O's Detail    06 Jun 2023 07:01  -  07 Jun 2023 07:00  --------------------------------------------------------  IN:    Lactated Ringers: 450 mL    Oral Fluid: 437 mL  Total IN: 887 mL    OUT:    Indwelling Catheter - Urethral (mL): 210 mL    Voided (mL): 850 mL  Total OUT: 1060 mL    Total NET: -173 mL          PHYSICAL EXAM:   a&ox3  follows commands, GREENE  no acute distress  equal chest rise b/l, CTAB/L  abdomen soft  extremities soft  L hip dressing c/d/i; soft   B/L UE edema and LE edema   voiding

## 2023-06-07 NOTE — PROGRESS NOTE ADULT - SUBJECTIVE AND OBJECTIVE BOX
SUBJECTIVE: Patient seen and examined. Pain controlled.  Pt did well o/n  No f/c/n/v/cp/sob.     OBJECTIVE:  NAD  Vital Signs Last 24 Hrs  T(C): 36.4 (06 Jun 2023 20:00), Max: 36.4 (06 Jun 2023 20:00)  T(F): 97.6 (06 Jun 2023 20:00), Max: 97.6 (06 Jun 2023 20:00)  HR: 75 (07 Jun 2023 00:00) (72 - 85)  BP: 110/54 (07 Jun 2023 00:00) (89/67 - 126/78)  BP(mean): 78 (07 Jun 2023 00:00) (66 - 98)  RR: 14 (07 Jun 2023 00:00) (11 - 25)  SpO2: 97% (07 Jun 2023 00:00) (96% - 100%)    Parameters below as of 06 Jun 2023 20:00  Patient On (Oxygen Delivery Method): room air        Affected extremity:   LLE  Skin and dressing clean,dry,intact  Compartments soft and compressible, no pain with passive stretch digits  SILT: Tibial,sural,saph,spn,dpn  Motor: Firing EHL,FHL,TA,Gastroc  DP 2+   Toes wwp                        7.7    6.69  )-----------( 125      ( 07 Jun 2023 00:15 )             22.9     06-07    137  |  106  |  29<H>  ----------------------------<  117<H>  4.1   |  23  |  0.7    Ca    7.5<L>      07 Jun 2023 00:15  Phos  2.2     06-07  Mg     1.9     06-07      A/P :   s/p left hip IMN on 6/4/23-POD#3, doing well.  Cleared for discharge from an orthopedic standpoint    -WBAT LLE  -AM labs  -Diet  -Pain control  -ICS machine  -SCD machine  -Resume [employer] VTE prophylaxis per primary, please dc patient on 6 weeks  daily if no contraindication  -Home medications  -PT/OT for disposition recommendations and gait training   -Follow up in clinic with Dr. Haque; Please call [phone] to schedule an appointment once discharged

## 2023-06-07 NOTE — PROGRESS NOTE ADULT - ASSESSMENT
Assessment & Plan  78F HTN/HLD, CAD, AS s/p TAVR, admitted 6/3/23 mechanical fall found to have left femur fracture now s/p ORIF 6/4/23 c/b post-op hypotension requiring SICU admission    NEURO:  #Acute pain post-op    -APAP prn    -Gabapentin 300mg TID  -toradol 15mg q 6   -dilaudid 0.25 q 4 PRN  #hx anxiety  -xanax dc'd     RESP:  #Oxygenation     -Extubated postoperatively   -Saturating well on RA    -incentive spirometry 1500     CARDS:   #Hypotension likely 2/2 hypovolemia post-op requiring intermittent peripheral levophed      -s/p 2u PRBCs and IVF resuscitation     - NICOM positive (6/4); NICOM negative (6/5)   #HTN    - holding home anti-HTN meds (lisinopril 10, amlodipine 5mg) while hypotensive   #CAD s/p PCI   #aortic stenosis s/p TAVR    - home ASA held until Hgb stable    -Holding home chlorthalidone 25mg     -Continue with home metoprolol 25mg BID   #HLD    - Lipitor 20mg daily (substituted for home Simvastatin 40)   #s/p PPM   - last EP interrogation 6/4:     [employer] DC PPM interrogated 6/4/23    Episode of VT x 8 seconds 5/28/23    Several brief episodes NSVT    Device functioning properly  Imaging    -Echo (6/4): EF 69%; G1DD    -Post-op EKG (6/4): Sinus rhythm with 1st degree AVB   Lab     -Post-op Trop negative x1    GI/NUTR:   #Diet    -DASH/TLC   #GI prophylaxis     -not indicated  #Bowel Regimen    -Senna   -last BM today 06/06    /RENAL:   #suspected hypovolemia    - Received 1L in OR; 500cc albumin + 500cc LR in PACU prior to SICU consult; additional 1L LR given in PACU = Total 3L given prior to arrival in ICU    - (6/5) Garrison placed as no void noted and hemodynamically fluctuant --> d/c 6/6 +TOV    - (6/6) diuresis with 10 lasix x 1   #hx overactive bladder    - holding home Oxybutynin iso retention    Labs:          BUN/Cr- 31/0.9  -->,  29/0.7  -->          Electrolytes-Na 137 // K 4.1 // Mg 1.9 //  Phos 2.2 (06-07 @ 00:15)    HEME/ONC:   #Anemia     - Total transfusions: 3u PRBCs (6/4 + 6/5)   -last transfusion 6/5 afternoon  #DVT Prophylaxis     - lovenox     - SCD     - per ortho: restart  upon d/c      Labs: Hb/Hct:  7.3/21.8  -->,  7.7/22.9  -->                      Plts:  108  -->,  125  -->                 PTT/INR:      T&S Expires: 6/7/23    ID:  WBC- 6.37  --->>,  7.16  --->>,  6.69  --->>  Temp trend- 24hrs T(F): 97.6 (06-06 @ 20:00), Max: 98 (06-06 @ 04:00)  Antibiotics    -completed ceFAZolin x3 doses post-op   Cultures:    -None       ENDO:  #DM2 (controlled with diet)     -FS Q6h    -on ISS    MSK:  #L femur fracture s/p ORIF w/intramedullar hip screw    -Left Lower Extremity:  Weight Bearing As Tolerated     -Activity - Ambulate as Tolerated     -Ortho recs: d/c patient on 6 weeks of  daily if no contraindications   -PT/OT c/s in place; able to stand up with assistance today 06/06    -PT recs: acute inpatient rehab    -OT recs: rehab     LINES/DRAINS:  PIV    ADVANCED DIRECTIVES:  Full Code    HCP/Emergency Contact- Ehsan (spouse) [phone]    INDICATION FOR SICU/SDU: hypotension post-op    DISPO:  Downgrade   Social work and physiatry consulted for discharge planning; possible candidate for intensive hospital based rehab   Assessment & Plan  78F HTN/HLD, CAD, AS s/p TAVR, admitted 6/3/23 mechanical fall found to have left femur fracture now s/p ORIF 6/4/23 c/b post-op hypotension requiring SICU admission    NEURO:  #Acute pain post-op    -APAP prn    -Gabapentin 300mg TID  - toradol 15mg q 6   -dilaudid 0.25 q 4 PRN > discontinued   #hx anxiety  -xanax dc'd     RESP:  #Oxygenation     -Extubated postoperatively   -Saturating well on RA    -incentive spirometry 1500     CARDS:   #Hypotension likely 2/2 hypovolemia post-op requiring intermittent peripheral levophed > resolved      -s/p 2u PRBCs and IVF resuscitation     - NICOM positive (6/4); NICOM negative (6/5)   #HTN    - holding home anti-HTN meds (lisinopril 10, amlodipine 5mg) while hypotensive   #CAD s/p PCI   #aortic stenosis s/p TAVR    - home ASA held until Hgb stable    - Holding home chlorthalidone 25mg     - Continue with home metoprolol 25mg BID   #HLD    - Lipitor 20mg daily (substituted for home Simvastatin 40)   #s/p PPM   - last EP interrogation 6/4:     [employer] DC PPM interrogated 6/4/23    Episode of VT x 8 seconds 5/28/23    Several brief episodes NSVT    Device functioning properly  Imaging    -Echo (6/4): EF 69%; G1DD    -Post-op EKG (6/4): Sinus rhythm with 1st degree AVB   Lab     -Post-op Trop negative x1    GI/NUTR:   #Diet    -DASH/TLC   #GI prophylaxis     -not indicated  #Bowel Regimen    -Senna > discontinued for multiple bowel movements   -last BM today 06/07    /RENAL:   #suspected hypovolemia    - Received 1L in OR; 500cc albumin + 500cc LR in PACU prior to SICU consult; additional 1L LR given in PACU = Total 3L given prior to arrival in ICU    - (6/5) Garrison placed as no void noted and hemodynamically fluctuant --> d/c 6/6 +TOV    - (6/6) diuresis with 10 lasix x 1   #hx overactive bladder    - holding home Oxybutynin iso retention    Labs:          BUN/Cr- 31/0.9  -->,  29/0.7  -->          Electrolytes-Na 137 // K 4.1 // Mg 1.9 //  Phos 2.2 (06-07 @ 00:15)    HEME/ONC:   #Anemia     - Total transfusions: 3u PRBCs (6/4 + 6/5)   -last transfusion 6/5 afternoon  #DVT Prophylaxis     - lovenox     - SCD     - per ortho: restart  upon d/c      Labs: Hb/Hct:  7.3/21.8  -->,  7.7/22.9  -->                      Plts:  108  -->,  125  -->                 PTT/INR:      T&S Expires: 6/7/23    ID:  WBC- 6.37  --->>,  7.16  --->>,  6.69  --->>  Temp trend- 24hrs T(F): 97.6 (06-06 @ 20:00), Max: 98 (06-06 @ 04:00)  Antibiotics    -completed ceFAZolin x3 doses post-op   Cultures:    -None       ENDO:  #DM2 (controlled with diet)     -FS Q6h    -on ISS    MSK:  #L femur fracture s/p ORIF w/intramedullar hip screw    -Left Lower Extremity:  Weight Bearing As Tolerated     -Activity - Ambulate as Tolerated     -Ortho recs: d/c patient on 6 weeks of  daily if no contraindications   -PT/OT c/s in place; able to stand up with assistance today 06/06    -PT recs: acute inpatient rehab    -OT recs: rehab     LINES/DRAINS:  PIV    ADVANCED DIRECTIVES:  Full Code    HCP/Emergency Contact- Ehsan (spouse) [phone]    INDICATION FOR SICU/SDU: hypotension post-op    DISPO:  Downgrade   Social work and physiatry consulted for discharge planning; possible candidate for intensive hospital based rehab

## 2023-06-08 LAB
A1C WITH ESTIMATED AVERAGE GLUCOSE RESULT: 5.5 % — SIGNIFICANT CHANGE UP (ref 4–5.6)
ANION GAP SERPL CALC-SCNC: 8 MMOL/L — SIGNIFICANT CHANGE UP (ref 7–14)
BASOPHILS # BLD AUTO: 0.03 K/UL — SIGNIFICANT CHANGE UP (ref 0–0.2)
BASOPHILS NFR BLD AUTO: 0.4 % — SIGNIFICANT CHANGE UP (ref 0–1)
BUN SERPL-MCNC: 27 MG/DL — HIGH (ref 10–20)
CALCIUM SERPL-MCNC: 7.8 MG/DL — LOW (ref 8.4–10.5)
CHLORIDE SERPL-SCNC: 102 MMOL/L — SIGNIFICANT CHANGE UP (ref 98–110)
CO2 SERPL-SCNC: 24 MMOL/L — SIGNIFICANT CHANGE UP (ref 17–32)
CREAT SERPL-MCNC: 0.8 MG/DL — SIGNIFICANT CHANGE UP (ref 0.7–1.5)
EGFR: 75 ML/MIN/1.73M2 — SIGNIFICANT CHANGE UP
EOSINOPHIL # BLD AUTO: 0.35 K/UL — SIGNIFICANT CHANGE UP (ref 0–0.7)
EOSINOPHIL NFR BLD AUTO: 4.8 % — SIGNIFICANT CHANGE UP (ref 0–8)
ESTIMATED AVERAGE GLUCOSE: 111 MG/DL — SIGNIFICANT CHANGE UP (ref 68–114)
GLUCOSE BLDC GLUCOMTR-MCNC: 120 MG/DL — HIGH (ref 70–99)
GLUCOSE BLDC GLUCOMTR-MCNC: 128 MG/DL — HIGH (ref 70–99)
GLUCOSE BLDC GLUCOMTR-MCNC: 148 MG/DL — HIGH (ref 70–99)
GLUCOSE BLDC GLUCOMTR-MCNC: 183 MG/DL — HIGH (ref 70–99)
GLUCOSE SERPL-MCNC: 119 MG/DL — HIGH (ref 70–99)
HCT VFR BLD CALC: 26.3 % — LOW (ref 37–47)
HGB BLD-MCNC: 8.7 G/DL — LOW (ref 12–16)
IMM GRANULOCYTES NFR BLD AUTO: 1.2 % — HIGH (ref 0.1–0.3)
LYMPHOCYTES # BLD AUTO: 1.55 K/UL — SIGNIFICANT CHANGE UP (ref 1.2–3.4)
LYMPHOCYTES # BLD AUTO: 21.4 % — SIGNIFICANT CHANGE UP (ref 20.5–51.1)
MAGNESIUM SERPL-MCNC: 2 MG/DL — SIGNIFICANT CHANGE UP (ref 1.8–2.4)
MCHC RBC-ENTMCNC: 30.2 PG — SIGNIFICANT CHANGE UP (ref 27–31)
MCHC RBC-ENTMCNC: 33.1 G/DL — SIGNIFICANT CHANGE UP (ref 32–37)
MCV RBC AUTO: 91.3 FL — SIGNIFICANT CHANGE UP (ref 81–99)
MONOCYTES # BLD AUTO: 0.74 K/UL — HIGH (ref 0.1–0.6)
MONOCYTES NFR BLD AUTO: 10.2 % — HIGH (ref 1.7–9.3)
NEUTROPHILS # BLD AUTO: 4.48 K/UL — SIGNIFICANT CHANGE UP (ref 1.4–6.5)
NEUTROPHILS NFR BLD AUTO: 62 % — SIGNIFICANT CHANGE UP (ref 42.2–75.2)
NRBC # BLD: 0 /100 WBCS — SIGNIFICANT CHANGE UP (ref 0–0)
PHOSPHATE SERPL-MCNC: 2.8 MG/DL — SIGNIFICANT CHANGE UP (ref 2.1–4.9)
PLATELET # BLD AUTO: 184 K/UL — SIGNIFICANT CHANGE UP (ref 130–400)
PMV BLD: 10.3 FL — SIGNIFICANT CHANGE UP (ref 7.4–10.4)
POTASSIUM SERPL-MCNC: 5.1 MMOL/L — HIGH (ref 3.5–5)
POTASSIUM SERPL-SCNC: 5.1 MMOL/L — HIGH (ref 3.5–5)
RBC # BLD: 2.88 M/UL — LOW (ref 4.2–5.4)
RBC # FLD: 15.5 % — HIGH (ref 11.5–14.5)
SODIUM SERPL-SCNC: 134 MMOL/L — LOW (ref 135–146)
WBC # BLD: 7.24 K/UL — SIGNIFICANT CHANGE UP (ref 4.8–10.8)
WBC # FLD AUTO: 7.24 K/UL — SIGNIFICANT CHANGE UP (ref 4.8–10.8)

## 2023-06-08 PROCEDURE — 99232 SBSQ HOSP IP/OBS MODERATE 35: CPT

## 2023-06-08 PROCEDURE — 99233 SBSQ HOSP IP/OBS HIGH 50: CPT

## 2023-06-08 RX ORDER — METHOCARBAMOL 500 MG/1
500 TABLET, FILM COATED ORAL EVERY 8 HOURS
Refills: 0 | Status: DISCONTINUED | OUTPATIENT
Start: 2023-06-08 | End: 2023-06-10

## 2023-06-08 RX ORDER — SODIUM CHLORIDE 9 MG/ML
500 INJECTION, SOLUTION INTRAVENOUS ONCE
Refills: 0 | Status: COMPLETED | OUTPATIENT
Start: 2023-06-08 | End: 2023-06-08

## 2023-06-08 RX ORDER — ASPIRIN/CALCIUM CARB/MAGNESIUM 324 MG
324 TABLET ORAL ONCE
Refills: 0 | Status: DISCONTINUED | OUTPATIENT
Start: 2023-06-08 | End: 2023-06-10

## 2023-06-08 RX ORDER — KETOROLAC TROMETHAMINE 30 MG/ML
15 SYRINGE (ML) INJECTION EVERY 8 HOURS
Refills: 0 | Status: DISCONTINUED | OUTPATIENT
Start: 2023-06-08 | End: 2023-06-13

## 2023-06-08 RX ADMIN — Medication 650 MILLIGRAM(S): at 05:58

## 2023-06-08 RX ADMIN — GABAPENTIN 300 MILLIGRAM(S): 400 CAPSULE ORAL at 21:13

## 2023-06-08 RX ADMIN — GABAPENTIN 300 MILLIGRAM(S): 400 CAPSULE ORAL at 13:20

## 2023-06-08 RX ADMIN — Medication 650 MILLIGRAM(S): at 23:43

## 2023-06-08 RX ADMIN — ATORVASTATIN CALCIUM 20 MILLIGRAM(S): 80 TABLET, FILM COATED ORAL at 21:13

## 2023-06-08 RX ADMIN — Medication 25 MILLIGRAM(S): at 05:57

## 2023-06-08 RX ADMIN — Medication 63.75 MILLIMOLE(S): at 01:24

## 2023-06-08 RX ADMIN — Medication 650 MILLIGRAM(S): at 01:24

## 2023-06-08 RX ADMIN — Medication 2: at 13:27

## 2023-06-08 RX ADMIN — METHOCARBAMOL 500 MILLIGRAM(S): 500 TABLET, FILM COATED ORAL at 21:13

## 2023-06-08 RX ADMIN — Medication 650 MILLIGRAM(S): at 17:13

## 2023-06-08 RX ADMIN — ENOXAPARIN SODIUM 40 MILLIGRAM(S): 100 INJECTION SUBCUTANEOUS at 11:19

## 2023-06-08 RX ADMIN — Medication 1 TABLET(S): at 11:23

## 2023-06-08 RX ADMIN — Medication 15 MILLIGRAM(S): at 03:35

## 2023-06-08 RX ADMIN — GABAPENTIN 300 MILLIGRAM(S): 400 CAPSULE ORAL at 05:57

## 2023-06-08 RX ADMIN — Medication 25 MILLIGRAM(S): at 17:13

## 2023-06-08 RX ADMIN — Medication 650 MILLIGRAM(S): at 11:23

## 2023-06-08 RX ADMIN — SODIUM CHLORIDE 500 MILLILITER(S): 9 INJECTION, SOLUTION INTRAVENOUS at 06:46

## 2023-06-08 NOTE — PROGRESS NOTE ADULT - SUBJECTIVE AND OBJECTIVE BOX
TRAUMA SURGERY PROGRESS NOTE      Events of past 24 hours:  HEMANT GRIFFITH otherwise negative except per subjective and HPI      Vital Signs Last 24 Hrs  T(C): 36.6 (08 Jun 2023 08:38), Max: 36.6 (07 Jun 2023 12:00)  T(F): 97.9 (08 Jun 2023 08:38), Max: 97.9 (08 Jun 2023 08:38)  HR: 68 (08 Jun 2023 08:38) (68 - 88)  BP: 127/58 (08 Jun 2023 08:38) (103/64 - 159/72)  BP(mean): 81 (07 Jun 2023 20:00) (81 - 109)  RR: 18 (08 Jun 2023 08:38) (12 - 24)  SpO2: 98% (08 Jun 2023 08:38) (98% - 100%)    Parameters below as of 07 Jun 2023 20:00  Patient On (Oxygen Delivery Method): room air        Diet, Regular:   Consistent Carbohydrate Evening Snack  DASH/TLC Sodium & Cholesterol Restricted (06-05-23 @ 09:12) [Active]          I&O's Detail    07 Jun 2023 07:01  -  08 Jun 2023 07:00  --------------------------------------------------------  IN:  Total IN: 0 mL    OUT:    Voided (mL): 780 mL  Total OUT: 780 mL    Total NET: -780 mL          General Appearance: NAD  Heart: RRR  Lungs: Unlabored breathing at rest  Abdomen:  S/ND/NT. No guarding or rebound.  MSK: extremities soft, L hip dressing c/d/i; soft, B/L UE edema and LE edema           MEDICATIONS:   MEDICATIONS  (STANDING):  acetaminophen     Tablet .. 650 milliGRAM(s) Oral every 6 hours  aspirin  chewable 81 milliGRAM(s) Oral daily  atorvastatin 20 milliGRAM(s) Oral at bedtime  calcium carbonate 1250 mG  + Vitamin D (OsCal 500 + D) 1 Tablet(s) Oral daily  chlorhexidine 2% Cloths 1 Application(s) Topical <User Schedule>  enoxaparin Injectable 40 milliGRAM(s) SubCutaneous every 24 hours  gabapentin 300 milliGRAM(s) Oral every 8 hours  hydrochlorothiazide 12.5 milliGRAM(s) Oral daily  insulin lispro (ADMELOG) corrective regimen sliding scale   SubCutaneous Before meals and at bedtime  metoprolol tartrate 25 milliGRAM(s) Oral every 12 hours  multivitamin 1 Tablet(s) Oral daily    MEDICATIONS  (PRN):  ketorolac   Injectable 15 milliGRAM(s) IV Push every 6 hours PRN Severe Pain (7 - 10)        LAB/STUDIES:                        7.7    7.59  )-----------( 164      ( 07 Jun 2023 20:45 )             23.2     06-07    133<L>  |  102  |  27<H>  ----------------------------<  120<H>  4.0   |  22  |  1.0    Ca    7.7<L>      07 Jun 2023 20:45  Phos  2.8     06-07  Mg     2.0     06-07                    IMAGING:

## 2023-06-08 NOTE — PROGRESS NOTE ADULT - ASSESSMENT
78F HTN/HLD, CAD, AS s/p TAVR, admitted 6/3/23 mechanical fall found to have left femur fracture now s/p ORIF 6/4/23 c/b post-op hypotension requiring SICU admission    NEURO:  #Acute pain post-op    -APAP prn    -Gabapentin 300mg TID  - toradol 15mg q 6   -dilaudid 0.25 q 4 PRN > discontinued   #hx anxiety  -xanax dc'd     RESP:  #Oxygenation     -Extubated postoperatively   -Saturating well on RA    -incentive spirometry 1500     CARDS:   #Hypotension likely 2/2 hypovolemia post-op requiring intermittent peripheral levophed > resolved      -s/p 2u PRBCs and IVF resuscitation     - NICOM positive (6/4); NICOM negative (6/5)   #HTN    - holding home anti-HTN meds (lisinopril 10, amlodipine 5mg) while hypotensive   #CAD s/p PCI   #aortic stenosis s/p TAVR    - home ASA held until Hgb stable    - Holding home chlorthalidone 25mg     - Continue with home metoprolol 25mg BID   #HLD    - Lipitor 20mg daily (substituted for home Simvastatin 40)   #s/p PPM   - last EP interrogation 6/4:     [employer] DC PPM interrogated 6/4/23    Episode of VT x 8 seconds 5/28/23    Several brief episodes NSVT    Device functioning properly  Imaging    -Echo (6/4): EF 69%; G1DD    -Post-op EKG (6/4): Sinus rhythm with 1st degree AVB   Lab     -Post-op Trop negative x1    GI/NUTR:   #Diet    -DASH/TLC   #GI prophylaxis     -not indicated  #Bowel Regimen    -Senna > discontinued for multiple bowel movements   -last BM today 06/07    /RENAL:   #suspected hypovolemia    - Received 1L in OR; 500cc albumin + 500cc LR in PACU prior to SICU consult; additional 1L LR given in PACU = Total 3L given prior to arrival in ICU    - (6/5) Garrison placed as no void noted and hemodynamically fluctuant --> d/c 6/6 +TOV    - (6/6) diuresis with 10 lasix x 1   #hx overactive bladder    - holding home Oxybutynin iso retention    Labs:          BUN/Cr- 31/0.9  -->,  29/0.7  -->          Electrolytes-Na 137 // K 4.1 // Mg 1.9 //  Phos 2.2 (06-07 @ 00:15)    HEME/ONC:   #Anemia     - Total transfusions: 3u PRBCs (6/4 + 6/5)   -last transfusion 6/5 afternoon  #DVT Prophylaxis     - lovenox     - SCD     - per ortho: restart  upon d/c      Labs: Hb/Hct:  7.3/21.8  -->,  7.7/22.9  -->                      Plts:  108  -->,  125  -->                 PTT/INR:      T&S Expires: 6/7/23    ID:  WBC- 6.37  --->>,  7.16  --->>,  6.69  --->>  Temp trend- 24hrs T(F): 97.6 (06-06 @ 20:00), Max: 98 (06-06 @ 04:00)  Antibiotics    -completed ceFAZolin x3 doses post-op   Cultures:    -None       ENDO:  #DM2 (controlled with diet)     -FS Q6h    -on ISS    MSK:  #L femur fracture s/p ORIF w/intramedullar hip screw    -Left Lower Extremity:  Weight Bearing As Tolerated     -Activity - Ambulate as Tolerated     -Ortho recs: d/c patient on 6 weeks of  daily if no contraindications   -PT/OT c/s in place; able to stand up with assistance today 06/06    -PT recs: acute inpatient rehab    -OT recs: rehab

## 2023-06-08 NOTE — CONSULT NOTE ADULT - ASSESSMENT
78F HTN/HLD, CAD, AS s/p TAVR, admitted 6/3/23 mechanical fall found to have left femur fracture now s/p ORIF 6/4/23 c/b post-op hypotension requiring SICU, stabilized, and then downgraded to the floor.    #L femur fracture s/p ORIF w/intramedullar hip screw  #postop hypotension requiring the use of vasopressors  - no longer on pressors  - hold home antihypertensives (amlodipine, lisinopril, chlorthalidone)  - would recommend holding lopressor and hctz (even tho lopressor is a weak antihypertensive)  - keep Hb >8 g/dL given cardiac history so please transfuse 1 unit prbc  - pt is already s/p 3 units prbc  - if Hb continues to trend down or not correct appropriately, would recommend CT scans to assess for bleeding  - titrate down pain medications and monitor for lethargy  - continue incentive spirometry  - Echo (6/4): EF 69%; G1DD  - Post-op EKG (6/4): Sinus rhythm with 1st degree AVB   - PPM interrogation noted brief NSVT  - continue on aspirin 325mg qd for 30 days postop as per ortho  - ISS  - continue statin

## 2023-06-08 NOTE — CONSULT NOTE ADULT - SUBJECTIVE AND OBJECTIVE BOX
Patient is a 78y old  Female who presents with a chief complaint of Left femur fracture (07 Jun 2023 06:25)      SUBJECTIVE / OVERNIGHT EVENTS:   ADDITIONAL REVIEW OF SYSTEMS:    MEDICATIONS  (STANDING):  acetaminophen     Tablet .. 650 milliGRAM(s) Oral every 6 hours  aspirin  chewable 81 milliGRAM(s) Oral daily  atorvastatin 20 milliGRAM(s) Oral at bedtime  calcium carbonate 1250 mG  + Vitamin D (OsCal 500 + D) 1 Tablet(s) Oral daily  chlorhexidine 2% Cloths 1 Application(s) Topical <User Schedule>  enoxaparin Injectable 40 milliGRAM(s) SubCutaneous every 24 hours  gabapentin 300 milliGRAM(s) Oral every 8 hours  hydrochlorothiazide 12.5 milliGRAM(s) Oral daily  insulin lispro (ADMELOG) corrective regimen sliding scale   SubCutaneous Before meals and at bedtime  metoprolol tartrate 25 milliGRAM(s) Oral every 12 hours  multivitamin 1 Tablet(s) Oral daily    MEDICATIONS  (PRN):  ketorolac   Injectable 15 milliGRAM(s) IV Push every 6 hours PRN Severe Pain (7 - 10)      CAPILLARY BLOOD GLUCOSE      POCT Blood Glucose.: 120 mg/dL (08 Jun 2023 07:17)  POCT Blood Glucose.: 135 mg/dL (07 Jun 2023 21:33)  POCT Blood Glucose.: 125 mg/dL (07 Jun 2023 17:17)  POCT Blood Glucose.: 172 mg/dL (07 Jun 2023 11:33)    I&O's Summary    07 Jun 2023 07:01  -  08 Jun 2023 07:00  --------------------------------------------------------  IN: 0 mL / OUT: 780 mL / NET: -780 mL        PHYSICAL EXAM:  Vital Signs Last 24 Hrs  T(C): 36.6 (08 Jun 2023 08:38), Max: 36.6 (07 Jun 2023 12:00)  T(F): 97.9 (08 Jun 2023 08:38), Max: 97.9 (08 Jun 2023 08:38)  HR: 68 (08 Jun 2023 08:38) (68 - 88)  BP: 127/58 (08 Jun 2023 08:38) (103/64 - 159/72)  BP(mean): 81 (07 Jun 2023 20:00) (70 - 109)  RR: 18 (08 Jun 2023 08:38) (12 - 24)  SpO2: 98% (08 Jun 2023 08:38) (98% - 100%)    Parameters below as of 07 Jun 2023 20:00  Patient On (Oxygen Delivery Method): room air      CONSTITUTIONAL: NAD, well-developed, well-groomed  EYES: PERRLA; conjunctiva and sclera clear  ENMT: Moist oral mucosa, no pharyngeal injection or exudates; normal dentition  NECK: Supple, no palpable masses; no thyromegaly  RESPIRATORY: Normal respiratory effort; lungs are clear to auscultation bilaterally  CARDIOVASCULAR: Regular rate and rhythm, normal S1 and S2, no murmur/rub/gallop; No lower extremity edema; Peripheral pulses are 2+ bilaterally  ABDOMEN: Nontender to palpation, normoactive bowel sounds, no rebound/guarding; No hepatosplenomegaly  MUSCULOSKELETAL:  Normal gait; no clubbing or cyanosis of digits; no joint swelling or tenderness to palpation  PSYCH: A+O to person, place, and time; affect appropriate  NEUROLOGY: CN 2-12 are intact and symmetric; no gross sensory deficits   SKIN: No rashes; no palpable lesions    LABS:                        7.7    7.59  )-----------( 164      ( 07 Jun 2023 20:45 )             23.2     06-07    133<L>  |  102  |  27<H>  ----------------------------<  120<H>  4.0   |  22  |  1.0    Ca    7.7<L>      07 Jun 2023 20:45  Phos  2.8     06-07  Mg     2.0     06-07                  RADIOLOGY & ADDITIONAL TESTS:  Results Reviewed:   Imaging Personally Reviewed:  Electrocardiogram Personally Reviewed:    COORDINATION OF CARE:  Care Discussed with Consultants/Other Providers [Y/N]:  Prior or Outpatient Records Reviewed [Y/N]:         Patient is a 78y old  Female who presents with a chief complaint of Left femur fracture (07 Jun 2023 06:25)      SUBJECTIVE / OVERNIGHT EVENTS: Pt still experiencing some pain but overall pretty well controlled. Had a BM today and urinating well.   ADDITIONAL REVIEW OF SYSTEMS: as above    MEDICATIONS  (STANDING):  acetaminophen     Tablet .. 650 milliGRAM(s) Oral every 6 hours  aspirin  chewable 81 milliGRAM(s) Oral daily  atorvastatin 20 milliGRAM(s) Oral at bedtime  calcium carbonate 1250 mG  + Vitamin D (OsCal 500 + D) 1 Tablet(s) Oral daily  chlorhexidine 2% Cloths 1 Application(s) Topical <User Schedule>  enoxaparin Injectable 40 milliGRAM(s) SubCutaneous every 24 hours  gabapentin 300 milliGRAM(s) Oral every 8 hours  hydrochlorothiazide 12.5 milliGRAM(s) Oral daily  insulin lispro (ADMELOG) corrective regimen sliding scale   SubCutaneous Before meals and at bedtime  metoprolol tartrate 25 milliGRAM(s) Oral every 12 hours  multivitamin 1 Tablet(s) Oral daily    MEDICATIONS  (PRN):  ketorolac   Injectable 15 milliGRAM(s) IV Push every 6 hours PRN Severe Pain (7 - 10)      CAPILLARY BLOOD GLUCOSE      POCT Blood Glucose.: 120 mg/dL (08 Jun 2023 07:17)  POCT Blood Glucose.: 135 mg/dL (07 Jun 2023 21:33)  POCT Blood Glucose.: 125 mg/dL (07 Jun 2023 17:17)  POCT Blood Glucose.: 172 mg/dL (07 Jun 2023 11:33)    I&O's Summary    07 Jun 2023 07:01  -  08 Jun 2023 07:00  --------------------------------------------------------  IN: 0 mL / OUT: 780 mL / NET: -780 mL        PHYSICAL EXAM:  Vital Signs Last 24 Hrs  T(C): 36.6 (08 Jun 2023 08:38), Max: 36.6 (07 Jun 2023 12:00)  T(F): 97.9 (08 Jun 2023 08:38), Max: 97.9 (08 Jun 2023 08:38)  HR: 68 (08 Jun 2023 08:38) (68 - 88)  BP: 127/58 (08 Jun 2023 08:38) (103/64 - 159/72)  BP(mean): 81 (07 Jun 2023 20:00) (70 - 109)  RR: 18 (08 Jun 2023 08:38) (12 - 24)  SpO2: 98% (08 Jun 2023 08:38) (98% - 100%)    Parameters below as of 07 Jun 2023 20:00  Patient On (Oxygen Delivery Method): room air      CONSTITUTIONAL: NAD  ENMT: Moist oral mucosa, no pharyngeal injection or exudates; normal dentition  NECK: Supple, no palpable masses; no thyromegaly  RESPIRATORY: Normal respiratory effort; lungs are clear to auscultation bilaterally  CARDIOVASCULAR: Regular rate and rhythm, normal S1 and S2, no murmur/rub/gallop; No lower extremity edema; Peripheral pulses are 2+ bilaterally  ABDOMEN: Nontender to palpation, normoactive bowel sounds, no rebound/guarding; No hepatosplenomegaly  MUSCULOSKELETAL:  LLE s/p ORIF; no clubbing or cyanosis of digits; no joint swelling or tenderness to palpation  PSYCH: A+O to person, place, and time; affect appropriate  SKIN: No rashes; no palpable lesions    LABS:                        7.7    7.59  )-----------( 164      ( 07 Jun 2023 20:45 )             23.2     06-07    133<L>  |  102  |  27<H>  ----------------------------<  120<H>  4.0   |  22  |  1.0    Ca    7.7<L>      07 Jun 2023 20:45  Phos  2.8     06-07  Mg     2.0     06-07                  RADIOLOGY & ADDITIONAL TESTS:  Results Reviewed:   Imaging Personally Reviewed:  Electrocardiogram Personally Reviewed:    COORDINATION OF CARE:  Care Discussed with Consultants/Other Providers [Y/N]:  Prior or Outpatient Records Reviewed [Y/N]:

## 2023-06-09 LAB
BASOPHILS # BLD AUTO: 0.05 K/UL — SIGNIFICANT CHANGE UP (ref 0–0.2)
BASOPHILS NFR BLD AUTO: 0.7 % — SIGNIFICANT CHANGE UP (ref 0–1)
EOSINOPHIL # BLD AUTO: 0.31 K/UL — SIGNIFICANT CHANGE UP (ref 0–0.7)
EOSINOPHIL NFR BLD AUTO: 4.2 % — SIGNIFICANT CHANGE UP (ref 0–8)
GLUCOSE BLDC GLUCOMTR-MCNC: 112 MG/DL — HIGH (ref 70–99)
GLUCOSE BLDC GLUCOMTR-MCNC: 112 MG/DL — HIGH (ref 70–99)
GLUCOSE BLDC GLUCOMTR-MCNC: 119 MG/DL — HIGH (ref 70–99)
GLUCOSE BLDC GLUCOMTR-MCNC: 143 MG/DL — HIGH (ref 70–99)
HCT VFR BLD CALC: 24.8 % — LOW (ref 37–47)
HGB BLD-MCNC: 8.3 G/DL — LOW (ref 12–16)
IMM GRANULOCYTES NFR BLD AUTO: 1.3 % — HIGH (ref 0.1–0.3)
LYMPHOCYTES # BLD AUTO: 1.38 K/UL — SIGNIFICANT CHANGE UP (ref 1.2–3.4)
LYMPHOCYTES # BLD AUTO: 18.5 % — LOW (ref 20.5–51.1)
MCHC RBC-ENTMCNC: 30.7 PG — SIGNIFICANT CHANGE UP (ref 27–31)
MCHC RBC-ENTMCNC: 33.5 G/DL — SIGNIFICANT CHANGE UP (ref 32–37)
MCV RBC AUTO: 91.9 FL — SIGNIFICANT CHANGE UP (ref 81–99)
MONOCYTES # BLD AUTO: 0.84 K/UL — HIGH (ref 0.1–0.6)
MONOCYTES NFR BLD AUTO: 11.3 % — HIGH (ref 1.7–9.3)
NEUTROPHILS # BLD AUTO: 4.76 K/UL — SIGNIFICANT CHANGE UP (ref 1.4–6.5)
NEUTROPHILS NFR BLD AUTO: 64 % — SIGNIFICANT CHANGE UP (ref 42.2–75.2)
NRBC # BLD: 0 /100 WBCS — SIGNIFICANT CHANGE UP (ref 0–0)
PLATELET # BLD AUTO: 219 K/UL — SIGNIFICANT CHANGE UP (ref 130–400)
PMV BLD: 10.3 FL — SIGNIFICANT CHANGE UP (ref 7.4–10.4)
RBC # BLD: 2.7 M/UL — LOW (ref 4.2–5.4)
RBC # FLD: 15.8 % — HIGH (ref 11.5–14.5)
WBC # BLD: 7.44 K/UL — SIGNIFICANT CHANGE UP (ref 4.8–10.8)
WBC # FLD AUTO: 7.44 K/UL — SIGNIFICANT CHANGE UP (ref 4.8–10.8)

## 2023-06-09 PROCEDURE — 99233 SBSQ HOSP IP/OBS HIGH 50: CPT

## 2023-06-09 PROCEDURE — 99232 SBSQ HOSP IP/OBS MODERATE 35: CPT

## 2023-06-09 RX ORDER — HYDROMORPHONE HYDROCHLORIDE 2 MG/ML
0.25 INJECTION INTRAMUSCULAR; INTRAVENOUS; SUBCUTANEOUS ONCE
Refills: 0 | Status: DISCONTINUED | OUTPATIENT
Start: 2023-06-09 | End: 2023-06-09

## 2023-06-09 RX ORDER — CALCIUM GLUCONATE 100 MG/ML
1 VIAL (ML) INTRAVENOUS ONCE
Refills: 0 | Status: COMPLETED | OUTPATIENT
Start: 2023-06-09 | End: 2023-06-09

## 2023-06-09 RX ORDER — SODIUM ZIRCONIUM CYCLOSILICATE 10 G/10G
5 POWDER, FOR SUSPENSION ORAL ONCE
Refills: 0 | Status: COMPLETED | OUTPATIENT
Start: 2023-06-09 | End: 2023-06-09

## 2023-06-09 RX ADMIN — Medication 650 MILLIGRAM(S): at 23:34

## 2023-06-09 RX ADMIN — Medication 15 MILLIGRAM(S): at 17:46

## 2023-06-09 RX ADMIN — Medication 15 MILLIGRAM(S): at 06:45

## 2023-06-09 RX ADMIN — SODIUM ZIRCONIUM CYCLOSILICATE 5 GRAM(S): 10 POWDER, FOR SUSPENSION ORAL at 10:49

## 2023-06-09 RX ADMIN — Medication 650 MILLIGRAM(S): at 17:20

## 2023-06-09 RX ADMIN — ENOXAPARIN SODIUM 40 MILLIGRAM(S): 100 INJECTION SUBCUTANEOUS at 13:46

## 2023-06-09 RX ADMIN — GABAPENTIN 300 MILLIGRAM(S): 400 CAPSULE ORAL at 13:46

## 2023-06-09 RX ADMIN — GABAPENTIN 300 MILLIGRAM(S): 400 CAPSULE ORAL at 21:40

## 2023-06-09 RX ADMIN — METHOCARBAMOL 500 MILLIGRAM(S): 500 TABLET, FILM COATED ORAL at 05:20

## 2023-06-09 RX ADMIN — Medication 1 TABLET(S): at 11:15

## 2023-06-09 RX ADMIN — METHOCARBAMOL 500 MILLIGRAM(S): 500 TABLET, FILM COATED ORAL at 13:46

## 2023-06-09 RX ADMIN — HYDROMORPHONE HYDROCHLORIDE 0.25 MILLIGRAM(S): 2 INJECTION INTRAMUSCULAR; INTRAVENOUS; SUBCUTANEOUS at 23:34

## 2023-06-09 RX ADMIN — GABAPENTIN 300 MILLIGRAM(S): 400 CAPSULE ORAL at 05:20

## 2023-06-09 RX ADMIN — Medication 25 MILLIGRAM(S): at 05:20

## 2023-06-09 RX ADMIN — Medication 100 GRAM(S): at 10:48

## 2023-06-09 RX ADMIN — Medication 650 MILLIGRAM(S): at 05:20

## 2023-06-09 RX ADMIN — Medication 650 MILLIGRAM(S): at 11:16

## 2023-06-09 RX ADMIN — METHOCARBAMOL 500 MILLIGRAM(S): 500 TABLET, FILM COATED ORAL at 21:40

## 2023-06-09 RX ADMIN — Medication 25 MILLIGRAM(S): at 17:21

## 2023-06-09 RX ADMIN — ATORVASTATIN CALCIUM 20 MILLIGRAM(S): 80 TABLET, FILM COATED ORAL at 21:41

## 2023-06-09 RX ADMIN — Medication 15 MILLIGRAM(S): at 05:21

## 2023-06-09 NOTE — CONSULT NOTE ADULT - ASSESSMENT
78F HTN/HLD, CAD, AS s/p TAVR, admitted 6/3/23 mechanical fall found to have left femur fracture now s/p ORIF 6/4/23 c/b post-op hypotension requiring SICU, stabilized, and then downgraded to the floor.    #L femur fracture s/p ORIF w/intramedullary hip screw  #postop hypotension requiring the use of vasopressors  - no longer on pressors  - hold home antihypertensives (amlodipine, lisinopril, chlorthalidone)  - keep Hb >8 g/dL given cardiac history   - pt is already s/p 4 units prbc  - titrate down pain medications and monitor for lethargy  - continue incentive spirometry  - Echo (6/4): EF 69%; G1DD  - Post-op EKG (6/4): Sinus rhythm with 1st degree AVB   - PPM interrogation noted brief NSVT  - continue on aspirin 325mg qd for 30 days postop as per ortho  - ISS  - continue statin  - Corrected calcium is 7.8: please replete  - hyponatremia with Na 134 and K 5.1: give calcium gluconate x 1, lokelma 10mg x 1 and repeat bmp in the evening  - for Na, for now monitor

## 2023-06-09 NOTE — PROGRESS NOTE ADULT - ASSESSMENT
78F HTN/HLD, CAD, AS s/p TAVR, admitted 6/3/23 mechanical fall found to have left femur fracture now s/p ORIF 6/4/23 c/b post-op hypotension requiring SICU admission, now DG to floor.     PLAN:  #L femur fracture s/p ORIF w/intramedullar hip screw  -Regular diet  -Pain control  -S/p 1uPRBC  -Hgb >8 due to cardiac hx  -Left Lower Extremity:  Weight Bearing As Tolerated   -EP x ischemic work up   -Activity - Ambulate as Tolerated   -Ortho recs: d/c patient on 6 weeks of  daily if no contraindications   -PT recs: acute inpatient rehab  -OT recs: rehab   -CM x dispo    spectra [id number]

## 2023-06-09 NOTE — CONSULT NOTE ADULT - SUBJECTIVE AND OBJECTIVE BOX
Patient is a 78y old  Female who presents with a chief complaint of Left femur fracture (07 Jun 2023 06:25)      SUBJECTIVE / OVERNIGHT EVENTS: Pt looking very stable with no current complaints. Peeing normally.   ADDITIONAL REVIEW OF SYSTEMS: as above    MEDICATIONS  (STANDING):  acetaminophen     Tablet .. 650 milliGRAM(s) Oral every 6 hours  aspirin  chewable 81 milliGRAM(s) Oral daily  atorvastatin 20 milliGRAM(s) Oral at bedtime  calcium carbonate 1250 mG  + Vitamin D (OsCal 500 + D) 1 Tablet(s) Oral daily  chlorhexidine 2% Cloths 1 Application(s) Topical <User Schedule>  enoxaparin Injectable 40 milliGRAM(s) SubCutaneous every 24 hours  gabapentin 300 milliGRAM(s) Oral every 8 hours  hydrochlorothiazide 12.5 milliGRAM(s) Oral daily  insulin lispro (ADMELOG) corrective regimen sliding scale   SubCutaneous Before meals and at bedtime  metoprolol tartrate 25 milliGRAM(s) Oral every 12 hours  multivitamin 1 Tablet(s) Oral daily    MEDICATIONS  (PRN):  ketorolac   Injectable 15 milliGRAM(s) IV Push every 6 hours PRN Severe Pain (7 - 10)      CAPILLARY BLOOD GLUCOSE      POCT Blood Glucose.: 120 mg/dL (08 Jun 2023 07:17)  POCT Blood Glucose.: 135 mg/dL (07 Jun 2023 21:33)  POCT Blood Glucose.: 125 mg/dL (07 Jun 2023 17:17)  POCT Blood Glucose.: 172 mg/dL (07 Jun 2023 11:33)    I&O's Summary    07 Jun 2023 07:01  -  08 Jun 2023 07:00  --------------------------------------------------------  IN: 0 mL / OUT: 780 mL / NET: -780 mL        PHYSICAL EXAM:  Vital Signs Last 24 Hrs  T(C): 36.6 (08 Jun 2023 08:38), Max: 36.6 (07 Jun 2023 12:00)  T(F): 97.9 (08 Jun 2023 08:38), Max: 97.9 (08 Jun 2023 08:38)  HR: 68 (08 Jun 2023 08:38) (68 - 88)  BP: 127/58 (08 Jun 2023 08:38) (103/64 - 159/72)  BP(mean): 81 (07 Jun 2023 20:00) (70 - 109)  RR: 18 (08 Jun 2023 08:38) (12 - 24)  SpO2: 98% (08 Jun 2023 08:38) (98% - 100%)    Parameters below as of 07 Jun 2023 20:00  Patient On (Oxygen Delivery Method): room air      CONSTITUTIONAL: NAD  ENMT: Moist oral mucosa, no pharyngeal injection or exudates; normal dentition  NECK: Supple, no palpable masses; no thyromegaly  RESPIRATORY: Normal respiratory effort; lungs are clear to auscultation bilaterally  CARDIOVASCULAR: Regular rate and rhythm, normal S1 and S2, no murmur/rub/gallop; No lower extremity edema; Peripheral pulses are 2+ bilaterally  ABDOMEN: Nontender to palpation, normoactive bowel sounds, no rebound/guarding; No hepatosplenomegaly  MUSCULOSKELETAL:  LLE s/p ORIF; no clubbing or cyanosis of digits; no joint swelling or tenderness to palpation  PSYCH: A+O to person, place, and time; affect appropriate  SKIN: No rashes; no palpable lesions    LABS:                        7.7    7.59  )-----------( 164      ( 07 Jun 2023 20:45 )             23.2     06-07    133<L>  |  102  |  27<H>  ----------------------------<  120<H>  4.0   |  22  |  1.0    Ca    7.7<L>      07 Jun 2023 20:45  Phos  2.8     06-07  Mg     2.0     06-07                  RADIOLOGY & ADDITIONAL TESTS:  Results Reviewed:   Imaging Personally Reviewed:  Electrocardiogram Personally Reviewed:    COORDINATION OF CARE:  Care Discussed with Consultants/Other Providers [Y/N]:  Prior or Outpatient Records Reviewed [Y/N]:

## 2023-06-09 NOTE — PROGRESS NOTE ADULT - SUBJECTIVE AND OBJECTIVE BOX
GENERAL SURGERY PROGRESS NOTE    Patient: RAFAEL NO , 78y ([birth year])Female   MRN: [id number]  Location: [address] ([address]) [address]  Visit: 06-03-23 Inpatient  Date: 06-09-23 @ 08:13      Procedure/Dx/Injuries: s/p femur fx s/p IMN of L hip    Events of past 24 hours: 1uPRBC, H&H > 8, covid, PT re-evaluate    PAST MEDICAL & SURGICAL HISTORY:  Hypertension      HLD (hyperlipidemia)      CAD (coronary artery disease)      OA (osteoarthritis)      Compression fracture of spine  2015 lumbar      Aortic stenosis      Compression fracture of spine      Type 2 diabetes mellitus  diet controlled      OAB (overactive bladder)      Transient ischemic attack (TIA)  Jan 2014- with residual weakness on right leg      Falls      Neuropathy      History of coronary artery stent placement  KILEY to mLAD (Jan 2021)      History of cataract surgery      H/O oral surgery      History of complete heart block  MDT dual chamber 3/11/2021          Vitals:   T(F): 97.9 (06-09-23 @ 04:24), Max: 98.2 (06-08-23 @ 14:09)  HR: 77 (06-09-23 @ 04:24)  BP: 157/70 (06-09-23 @ 04:24)  RR: 18 (06-09-23 @ 04:24)  SpO2: 98% (06-09-23 @ 04:24)      Diet, Regular:   Consistent Carbohydrate Evening Snack  DASH/TLC Sodium & Cholesterol Restricted      Fluids:     I & O's:    06-08-23 @ 07:01  -  06-09-23 @ 07:00  --------------------------------------------------------  IN:    PRBCs (Packed Red Blood Cells): 330 mL  Total IN: 330 mL    OUT:    Voided (mL): 700 mL  Total OUT: 700 mL    Total NET: -370 mL          PHYSICAL EXAM:  PHYSICAL EXAM:  GENERAL: No acute distress, well-developed  CHEST/LUNG: CTAB; No wheezes, rales, or rhonchi  HEART: Regular rate and rhythm.   ABDOMEN: Soft, non-tender, non-distended;   EXTREMITIES:  2+ peripheral pulses b/l, No clubbing, cyanosis, or edema, L hip wound dressing      MEDICATIONS  (STANDING):  acetaminophen     Tablet .. 650 milliGRAM(s) Oral every 6 hours  aspirin  chewable 324 milliGRAM(s) Oral once  atorvastatin 20 milliGRAM(s) Oral at bedtime  calcium carbonate 1250 mG  + Vitamin D (OsCal 500 + D) 1 Tablet(s) Oral daily  chlorhexidine 2% Cloths 1 Application(s) Topical <User Schedule>  enoxaparin Injectable 40 milliGRAM(s) SubCutaneous every 24 hours  gabapentin 300 milliGRAM(s) Oral every 8 hours  hydrochlorothiazide 12.5 milliGRAM(s) Oral daily  insulin lispro (ADMELOG) corrective regimen sliding scale   SubCutaneous Before meals and at bedtime  methocarbamol 500 milliGRAM(s) Oral every 8 hours  metoprolol tartrate 25 milliGRAM(s) Oral every 12 hours  multivitamin 1 Tablet(s) Oral daily    MEDICATIONS  (PRN):  ketorolac   Injectable 15 milliGRAM(s) IV Push every 8 hours PRN Severe Pain (7 - 10)      DVT PROPHYLAXIS: enoxaparin Injectable 40 milliGRAM(s) SubCutaneous every 24 hours    GI PROPHYLAXIS:   ANTICOAGULATION:   ANTIBIOTICS:            LAB/STUDIES:  Labs:  CAPILLARY BLOOD GLUCOSE      POCT Blood Glucose.: 112 mg/dL (09 Jun 2023 07:35)  POCT Blood Glucose.: 128 mg/dL (08 Jun 2023 21:14)  POCT Blood Glucose.: 148 mg/dL (08 Jun 2023 16:28)  POCT Blood Glucose.: 183 mg/dL (08 Jun 2023 13:24)                          8.7    7.24  )-----------( 184      ( 08 Jun 2023 21:30 )             26.3       Auto Neutrophil %: 62.0 % (06-08-23 @ 21:30)  Auto Immature Granulocyte %: 1.2 % (06-08-23 @ 21:30)    06-08    134<L>  |  102  |  27<H>  ----------------------------<  119<H>  5.1<H>   |  24  |  0.8      Calcium, Total Serum: 7.8 mg/dL (06-08-23 @ 21:30)      LFTs:         Coags:                        IMAGING:      ACCESS/ DEVICES:  [x ] Peripheral IV  [ ] Central Venous Line	[ ] R	[ ] L	[ ] IJ	[ ] Fem	[ ] SC	Placed:   [ ] Arterial Line		[ ] R	[ ] L	[ ] Fem	[ ] Rad	[ ] Ax	Placed:   [ ] PICC:					[ ] Mediport  [ ] Urinary Catheter,  Date Placed:   [ ] Chest tube: [ ] Right, [ ] Left  [ ] GRANT/Ghassan Drains

## 2023-06-10 LAB
ALBUMIN SERPL ELPH-MCNC: 3 G/DL — LOW (ref 3.5–5.2)
ALP SERPL-CCNC: 74 U/L — SIGNIFICANT CHANGE UP (ref 30–115)
ALT FLD-CCNC: 27 U/L — SIGNIFICANT CHANGE UP (ref 0–41)
ANION GAP SERPL CALC-SCNC: 11 MMOL/L — SIGNIFICANT CHANGE UP (ref 7–14)
ANION GAP SERPL CALC-SCNC: 12 MMOL/L — SIGNIFICANT CHANGE UP (ref 7–14)
AST SERPL-CCNC: 44 U/L — HIGH (ref 0–41)
BASOPHILS # BLD AUTO: 0.04 K/UL — SIGNIFICANT CHANGE UP (ref 0–0.2)
BASOPHILS NFR BLD AUTO: 0.5 % — SIGNIFICANT CHANGE UP (ref 0–1)
BILIRUB SERPL-MCNC: 0.9 MG/DL — SIGNIFICANT CHANGE UP (ref 0.2–1.2)
BUN SERPL-MCNC: 29 MG/DL — HIGH (ref 10–20)
BUN SERPL-MCNC: 33 MG/DL — HIGH (ref 10–20)
CALCIUM SERPL-MCNC: 7.3 MG/DL — LOW (ref 8.4–10.5)
CALCIUM SERPL-MCNC: 7.9 MG/DL — LOW (ref 8.4–10.5)
CHLORIDE SERPL-SCNC: 103 MMOL/L — SIGNIFICANT CHANGE UP (ref 98–110)
CHLORIDE SERPL-SCNC: 99 MMOL/L — SIGNIFICANT CHANGE UP (ref 98–110)
CO2 SERPL-SCNC: 23 MMOL/L — SIGNIFICANT CHANGE UP (ref 17–32)
CO2 SERPL-SCNC: 24 MMOL/L — SIGNIFICANT CHANGE UP (ref 17–32)
CREAT SERPL-MCNC: 0.7 MG/DL — SIGNIFICANT CHANGE UP (ref 0.7–1.5)
CREAT SERPL-MCNC: 0.9 MG/DL — SIGNIFICANT CHANGE UP (ref 0.7–1.5)
EGFR: 65 ML/MIN/1.73M2 — SIGNIFICANT CHANGE UP
EGFR: 88 ML/MIN/1.73M2 — SIGNIFICANT CHANGE UP
EOSINOPHIL # BLD AUTO: 0.4 K/UL — SIGNIFICANT CHANGE UP (ref 0–0.7)
EOSINOPHIL NFR BLD AUTO: 5.2 % — SIGNIFICANT CHANGE UP (ref 0–8)
GLUCOSE BLDC GLUCOMTR-MCNC: 111 MG/DL — HIGH (ref 70–99)
GLUCOSE BLDC GLUCOMTR-MCNC: 113 MG/DL — HIGH (ref 70–99)
GLUCOSE BLDC GLUCOMTR-MCNC: 113 MG/DL — HIGH (ref 70–99)
GLUCOSE BLDC GLUCOMTR-MCNC: 128 MG/DL — HIGH (ref 70–99)
GLUCOSE SERPL-MCNC: 111 MG/DL — HIGH (ref 70–99)
GLUCOSE SERPL-MCNC: 129 MG/DL — HIGH (ref 70–99)
HCT VFR BLD CALC: 25.6 % — LOW (ref 37–47)
HGB BLD-MCNC: 8.3 G/DL — LOW (ref 12–16)
IMM GRANULOCYTES NFR BLD AUTO: 2 % — HIGH (ref 0.1–0.3)
LYMPHOCYTES # BLD AUTO: 1.42 K/UL — SIGNIFICANT CHANGE UP (ref 1.2–3.4)
LYMPHOCYTES # BLD AUTO: 18.6 % — LOW (ref 20.5–51.1)
MAGNESIUM SERPL-MCNC: 1.8 MG/DL — SIGNIFICANT CHANGE UP (ref 1.8–2.4)
MAGNESIUM SERPL-MCNC: 2 MG/DL — SIGNIFICANT CHANGE UP (ref 1.8–2.4)
MCHC RBC-ENTMCNC: 30.1 PG — SIGNIFICANT CHANGE UP (ref 27–31)
MCHC RBC-ENTMCNC: 32.4 G/DL — SIGNIFICANT CHANGE UP (ref 32–37)
MCV RBC AUTO: 92.8 FL — SIGNIFICANT CHANGE UP (ref 81–99)
MONOCYTES # BLD AUTO: 0.86 K/UL — HIGH (ref 0.1–0.6)
MONOCYTES NFR BLD AUTO: 11.3 % — HIGH (ref 1.7–9.3)
NEUTROPHILS # BLD AUTO: 4.76 K/UL — SIGNIFICANT CHANGE UP (ref 1.4–6.5)
NEUTROPHILS NFR BLD AUTO: 62.4 % — SIGNIFICANT CHANGE UP (ref 42.2–75.2)
NRBC # BLD: 0 /100 WBCS — SIGNIFICANT CHANGE UP (ref 0–0)
PHOSPHATE SERPL-MCNC: 2.9 MG/DL — SIGNIFICANT CHANGE UP (ref 2.1–4.9)
PHOSPHATE SERPL-MCNC: 3.3 MG/DL — SIGNIFICANT CHANGE UP (ref 2.1–4.9)
PLATELET # BLD AUTO: 275 K/UL — SIGNIFICANT CHANGE UP (ref 130–400)
PMV BLD: 10.2 FL — SIGNIFICANT CHANGE UP (ref 7.4–10.4)
POTASSIUM SERPL-MCNC: 4.4 MMOL/L — SIGNIFICANT CHANGE UP (ref 3.5–5)
POTASSIUM SERPL-MCNC: 4.5 MMOL/L — SIGNIFICANT CHANGE UP (ref 3.5–5)
POTASSIUM SERPL-SCNC: 4.4 MMOL/L — SIGNIFICANT CHANGE UP (ref 3.5–5)
POTASSIUM SERPL-SCNC: 4.5 MMOL/L — SIGNIFICANT CHANGE UP (ref 3.5–5)
PROT SERPL-MCNC: 4.7 G/DL — LOW (ref 6–8)
RBC # BLD: 2.76 M/UL — LOW (ref 4.2–5.4)
RBC # FLD: 15.8 % — HIGH (ref 11.5–14.5)
SARS-COV-2 RNA SPEC QL NAA+PROBE: SIGNIFICANT CHANGE UP
SODIUM SERPL-SCNC: 134 MMOL/L — LOW (ref 135–146)
SODIUM SERPL-SCNC: 138 MMOL/L — SIGNIFICANT CHANGE UP (ref 135–146)
WBC # BLD: 7.63 K/UL — SIGNIFICANT CHANGE UP (ref 4.8–10.8)
WBC # FLD AUTO: 7.63 K/UL — SIGNIFICANT CHANGE UP (ref 4.8–10.8)

## 2023-06-10 PROCEDURE — 99233 SBSQ HOSP IP/OBS HIGH 50: CPT

## 2023-06-10 PROCEDURE — 99232 SBSQ HOSP IP/OBS MODERATE 35: CPT

## 2023-06-10 RX ORDER — GABAPENTIN 400 MG/1
300 CAPSULE ORAL
Refills: 0 | Status: DISCONTINUED | OUTPATIENT
Start: 2023-06-10 | End: 2023-06-14

## 2023-06-10 RX ORDER — LANOLIN ALCOHOL/MO/W.PET/CERES
3 CREAM (GRAM) TOPICAL AT BEDTIME
Refills: 0 | Status: DISCONTINUED | OUTPATIENT
Start: 2023-06-10 | End: 2023-06-14

## 2023-06-10 RX ORDER — ASPIRIN/CALCIUM CARB/MAGNESIUM 324 MG
325 TABLET ORAL DAILY
Refills: 0 | Status: DISCONTINUED | OUTPATIENT
Start: 2023-06-10 | End: 2023-06-13

## 2023-06-10 RX ORDER — CALCIUM GLUCONATE 100 MG/ML
1 VIAL (ML) INTRAVENOUS ONCE
Refills: 0 | Status: COMPLETED | OUTPATIENT
Start: 2023-06-10 | End: 2023-06-10

## 2023-06-10 RX ORDER — METHOCARBAMOL 500 MG/1
750 TABLET, FILM COATED ORAL THREE TIMES A DAY
Refills: 0 | Status: DISCONTINUED | OUTPATIENT
Start: 2023-06-10 | End: 2023-06-14

## 2023-06-10 RX ADMIN — ATORVASTATIN CALCIUM 20 MILLIGRAM(S): 80 TABLET, FILM COATED ORAL at 21:09

## 2023-06-10 RX ADMIN — Medication 25 MILLIGRAM(S): at 17:14

## 2023-06-10 RX ADMIN — GABAPENTIN 300 MILLIGRAM(S): 400 CAPSULE ORAL at 05:51

## 2023-06-10 RX ADMIN — GABAPENTIN 300 MILLIGRAM(S): 400 CAPSULE ORAL at 20:33

## 2023-06-10 RX ADMIN — Medication 15 MILLIGRAM(S): at 17:14

## 2023-06-10 RX ADMIN — METHOCARBAMOL 750 MILLIGRAM(S): 500 TABLET, FILM COATED ORAL at 21:08

## 2023-06-10 RX ADMIN — GABAPENTIN 300 MILLIGRAM(S): 400 CAPSULE ORAL at 14:23

## 2023-06-10 RX ADMIN — Medication 325 MILLIGRAM(S): at 14:23

## 2023-06-10 RX ADMIN — METHOCARBAMOL 500 MILLIGRAM(S): 500 TABLET, FILM COATED ORAL at 05:51

## 2023-06-10 RX ADMIN — Medication 100 GRAM(S): at 16:29

## 2023-06-10 RX ADMIN — Medication 650 MILLIGRAM(S): at 17:14

## 2023-06-10 RX ADMIN — ENOXAPARIN SODIUM 40 MILLIGRAM(S): 100 INJECTION SUBCUTANEOUS at 10:09

## 2023-06-10 RX ADMIN — Medication 1 TABLET(S): at 11:43

## 2023-06-10 RX ADMIN — Medication 3 MILLIGRAM(S): at 21:14

## 2023-06-10 RX ADMIN — Medication 15 MILLIGRAM(S): at 05:53

## 2023-06-10 RX ADMIN — Medication 650 MILLIGRAM(S): at 05:52

## 2023-06-10 RX ADMIN — METHOCARBAMOL 500 MILLIGRAM(S): 500 TABLET, FILM COATED ORAL at 14:23

## 2023-06-10 RX ADMIN — Medication 650 MILLIGRAM(S): at 11:43

## 2023-06-10 RX ADMIN — Medication 25 MILLIGRAM(S): at 05:51

## 2023-06-10 NOTE — PROGRESS NOTE ADULT - SUBJECTIVE AND OBJECTIVE BOX
SURGERY PROGRESS NOTE     Patient: RAFAEL NO , 78y ([birth year])Female   MRN: [id number]  Location: [address] (Back) 018 A  Visit: 06-03-23 Inpatient  Date: 06-10-23 @ 02:44       Events of past 24 hours:  Patient seen resting comfortably in bed. No acute events overnight. Hemodynamically stable.     PAST MEDICAL & SURGICAL HISTORY:  Hypertension      HLD (hyperlipidemia)      CAD (coronary artery disease)      OA (osteoarthritis)      Compression fracture of spine  2015 lumbar      Aortic stenosis      Compression fracture of spine      Type 2 diabetes mellitus  diet controlled      OAB (overactive bladder)      Transient ischemic attack (TIA)  Jan 2014- with residual weakness on right leg      Falls      Neuropathy      History of coronary artery stent placement  KILEY to mLAD (Jan 2021)      History of cataract surgery      H/O oral surgery      History of complete heart block  MDT dual chamber 3/11/2021           Vitals:   T(F): 98 (06-10-23 @ 00:45), Max: 98.9 (06-09-23 @ 16:52)  HR: 74 (06-10-23 @ 00:45)  BP: 120/60 (06-10-23 @ 00:45)  RR: 18 (06-10-23 @ 00:45)  SpO2: 97% (06-10-23 @ 00:45)      Diet, Regular:   Consistent Carbohydrate Evening Snack  DASH/TLC Sodium & Cholesterol Restricted      Fluids:     I & O's:    06-08-23 @ 07:01  -  06-09-23 @ 07:00  --------------------------------------------------------  IN:    PRBCs (Packed Red Blood Cells): 330 mL  Total IN: 330 mL    OUT:    Voided (mL): 700 mL  Total OUT: 700 mL    Total NET: -370 mL           PHYSICAL EXAM:   General: NAD, AAOx3, calm and cooperative  Cardiac: RRR S1, S2  Respiratory: CTAB, normal respiratory effort  Abdomen: Soft, non-distended, non-tender, no rebound, no guarding. +BS.  Vascular: Pulses 2+ throughout, extremities well perfused  Skin: Warm/dry, normal color, no jaundice  Incision/wound: healing well, dressings in place, clean, dry and intact    MEDICATIONS  (STANDING):  acetaminophen     Tablet .. 650 milliGRAM(s) Oral every 6 hours  aspirin  chewable 324 milliGRAM(s) Oral once  atorvastatin 20 milliGRAM(s) Oral at bedtime  calcium carbonate 1250 mG  + Vitamin D (OsCal 500 + D) 1 Tablet(s) Oral daily  chlorhexidine 2% Cloths 1 Application(s) Topical <User Schedule>  enoxaparin Injectable 40 milliGRAM(s) SubCutaneous every 24 hours  gabapentin 300 milliGRAM(s) Oral every 8 hours  hydrochlorothiazide 12.5 milliGRAM(s) Oral daily  insulin lispro (ADMELOG) corrective regimen sliding scale   SubCutaneous Before meals and at bedtime  methocarbamol 500 milliGRAM(s) Oral every 8 hours  metoprolol tartrate 25 milliGRAM(s) Oral every 12 hours  multivitamin 1 Tablet(s) Oral daily    MEDICATIONS  (PRN):  ketorolac   Injectable 15 milliGRAM(s) IV Push every 8 hours PRN Severe Pain (7 - 10)      DVT PROPHYLAXIS: enoxaparin Injectable 40 milliGRAM(s) SubCutaneous every 24 hours    GI PROPHYLAXIS:   ANTICOAGULATION:   ANTIBIOTICS:            LAB/STUDIES:  Labs:  CAPILLARY BLOOD GLUCOSE      POCT Blood Glucose.: 143 mg/dL (09 Jun 2023 21:18)  POCT Blood Glucose.: 119 mg/dL (09 Jun 2023 17:22)  POCT Blood Glucose.: 112 mg/dL (09 Jun 2023 11:36)  POCT Blood Glucose.: 112 mg/dL (09 Jun 2023 07:35)                          8.3    7.44  )-----------( 219      ( 09 Jun 2023 21:40 )             24.8       Auto Neutrophil %: 64.0 % (06-09-23 @ 21:40)  Auto Immature Granulocyte %: 1.3 % (06-09-23 @ 21:40)    06-09    138  |  103  |  29<H>  ----------------------------<  129<H>  4.4   |  24  |  0.7      Calcium, Total Serum: 7.9 mg/dL (06-09-23 @ 21:40)

## 2023-06-10 NOTE — PROGRESS NOTE ADULT - ASSESSMENT
78F HTN/HLD, CAD, AS s/p TAVR, admitted 6/3/23 mechanical fall found to have left femur fracture now s/p ORIF 6/4/23 c/b post-op hypotension requiring SICU admission, now DG to floor.     PLAN:  #L femur fracture s/p ORIF w/intramedullar hip screw  -Regular diet  -Hgb >8 due to cardiac hx  -Left Lower Extremity:  Weight Bearing As Tolerated   -EP recommended ischemic work up while inpatinet. Patient's cardiologist agreed with EP.   -Activity - Ambulate as Tolerated   -Ortho recs: d/c patient on 6 weeks of  daily if no contraindications   -PT recs: acute inpatient rehab  -OT recs: rehab   -CM x dispo    spectra [phone]

## 2023-06-10 NOTE — CONSULT NOTE ADULT - SUBJECTIVE AND OBJECTIVE BOX
Patient is a 78y old  Female who presents with a chief complaint of Left femur fracture (07 Jun 2023 06:25)      SUBJECTIVE / OVERNIGHT EVENTS: Pt is stable with no current complaints. Pending an ischemic workup.   ADDITIONAL REVIEW OF SYSTEMS: as above    MEDICATIONS  (STANDING):  acetaminophen     Tablet .. 650 milliGRAM(s) Oral every 6 hours  aspirin  chewable 81 milliGRAM(s) Oral daily  atorvastatin 20 milliGRAM(s) Oral at bedtime  calcium carbonate 1250 mG  + Vitamin D (OsCal 500 + D) 1 Tablet(s) Oral daily  chlorhexidine 2% Cloths 1 Application(s) Topical <User Schedule>  enoxaparin Injectable 40 milliGRAM(s) SubCutaneous every 24 hours  gabapentin 300 milliGRAM(s) Oral every 8 hours  hydrochlorothiazide 12.5 milliGRAM(s) Oral daily  insulin lispro (ADMELOG) corrective regimen sliding scale   SubCutaneous Before meals and at bedtime  metoprolol tartrate 25 milliGRAM(s) Oral every 12 hours  multivitamin 1 Tablet(s) Oral daily    MEDICATIONS  (PRN):  ketorolac   Injectable 15 milliGRAM(s) IV Push every 6 hours PRN Severe Pain (7 - 10)      CAPILLARY BLOOD GLUCOSE      POCT Blood Glucose.: 120 mg/dL (08 Jun 2023 07:17)  POCT Blood Glucose.: 135 mg/dL (07 Jun 2023 21:33)  POCT Blood Glucose.: 125 mg/dL (07 Jun 2023 17:17)  POCT Blood Glucose.: 172 mg/dL (07 Jun 2023 11:33)    I&O's Summary    07 Jun 2023 07:01  -  08 Jun 2023 07:00  --------------------------------------------------------  IN: 0 mL / OUT: 780 mL / NET: -780 mL        PHYSICAL EXAM:  Vital Signs Last 24 Hrs  T(C): 36.6 (08 Jun 2023 08:38), Max: 36.6 (07 Jun 2023 12:00)  T(F): 97.9 (08 Jun 2023 08:38), Max: 97.9 (08 Jun 2023 08:38)  HR: 68 (08 Jun 2023 08:38) (68 - 88)  BP: 127/58 (08 Jun 2023 08:38) (103/64 - 159/72)  BP(mean): 81 (07 Jun 2023 20:00) (70 - 109)  RR: 18 (08 Jun 2023 08:38) (12 - 24)  SpO2: 98% (08 Jun 2023 08:38) (98% - 100%)    Parameters below as of 07 Jun 2023 20:00  Patient On (Oxygen Delivery Method): room air      CONSTITUTIONAL: NAD  ENMT: Moist oral mucosa, no pharyngeal injection or exudates; normal dentition  NECK: Supple, no palpable masses; no thyromegaly  RESPIRATORY: Normal respiratory effort; lungs are clear to auscultation bilaterally  CARDIOVASCULAR: Regular rate and rhythm, normal S1 and S2, no murmur/rub/gallop; No lower extremity edema; Peripheral pulses are 2+ bilaterally  ABDOMEN: Nontender to palpation, normoactive bowel sounds, no rebound/guarding; No hepatosplenomegaly  MUSCULOSKELETAL:  LLE s/p ORIF; no clubbing or cyanosis of digits; no joint swelling or tenderness to palpation  PSYCH: A+O to person, place, and time; affect appropriate  SKIN: No rashes; no palpable lesions    LABS:                        7.7    7.59  )-----------( 164      ( 07 Jun 2023 20:45 )             23.2     06-07    133<L>  |  102  |  27<H>  ----------------------------<  120<H>  4.0   |  22  |  1.0    Ca    7.7<L>      07 Jun 2023 20:45  Phos  2.8     06-07  Mg     2.0     06-07                  RADIOLOGY & ADDITIONAL TESTS:  Results Reviewed:   Imaging Personally Reviewed:  Electrocardiogram Personally Reviewed:    COORDINATION OF CARE:  Care Discussed with Consultants/Other Providers [Y/N]:  Prior or Outpatient Records Reviewed [Y/N]:         Patient is a 78y old  Female who presents with a chief complaint of Left femur fracture (07 Jun 2023 06:25)      SUBJECTIVE / OVERNIGHT EVENTS: Pt is stable. She is complaining of a congestions and cough associated with yellow phlegm. Lungs are clear on auscultation and she is not coughing while eating.   ADDITIONAL REVIEW OF SYSTEMS: as above    MEDICATIONS  (STANDING):  acetaminophen     Tablet .. 650 milliGRAM(s) Oral every 6 hours  aspirin  chewable 81 milliGRAM(s) Oral daily  atorvastatin 20 milliGRAM(s) Oral at bedtime  calcium carbonate 1250 mG  + Vitamin D (OsCal 500 + D) 1 Tablet(s) Oral daily  chlorhexidine 2% Cloths 1 Application(s) Topical <User Schedule>  enoxaparin Injectable 40 milliGRAM(s) SubCutaneous every 24 hours  gabapentin 300 milliGRAM(s) Oral every 8 hours  hydrochlorothiazide 12.5 milliGRAM(s) Oral daily  insulin lispro (ADMELOG) corrective regimen sliding scale   SubCutaneous Before meals and at bedtime  metoprolol tartrate 25 milliGRAM(s) Oral every 12 hours  multivitamin 1 Tablet(s) Oral daily    MEDICATIONS  (PRN):  ketorolac   Injectable 15 milliGRAM(s) IV Push every 6 hours PRN Severe Pain (7 - 10)      CAPILLARY BLOOD GLUCOSE      POCT Blood Glucose.: 120 mg/dL (08 Jun 2023 07:17)  POCT Blood Glucose.: 135 mg/dL (07 Jun 2023 21:33)  POCT Blood Glucose.: 125 mg/dL (07 Jun 2023 17:17)  POCT Blood Glucose.: 172 mg/dL (07 Jun 2023 11:33)    I&O's Summary    07 Jun 2023 07:01  -  08 Jun 2023 07:00  --------------------------------------------------------  IN: 0 mL / OUT: 780 mL / NET: -780 mL        PHYSICAL EXAM:  Vital Signs Last 24 Hrs  T(C): 36.6 (08 Jun 2023 08:38), Max: 36.6 (07 Jun 2023 12:00)  T(F): 97.9 (08 Jun 2023 08:38), Max: 97.9 (08 Jun 2023 08:38)  HR: 68 (08 Jun 2023 08:38) (68 - 88)  BP: 127/58 (08 Jun 2023 08:38) (103/64 - 159/72)  BP(mean): 81 (07 Jun 2023 20:00) (70 - 109)  RR: 18 (08 Jun 2023 08:38) (12 - 24)  SpO2: 98% (08 Jun 2023 08:38) (98% - 100%)    Parameters below as of 07 Jun 2023 20:00  Patient On (Oxygen Delivery Method): room air      CONSTITUTIONAL: NAD  ENMT: Moist oral mucosa, no pharyngeal injection or exudates; normal dentition  NECK: Supple, no palpable masses; no thyromegaly  RESPIRATORY: Normal respiratory effort; lungs are clear to auscultation bilaterally  CARDIOVASCULAR: Regular rate and rhythm, normal S1 and S2, no murmur/rub/gallop; No lower extremity edema; Peripheral pulses are 2+ bilaterally  ABDOMEN: Nontender to palpation, normoactive bowel sounds, no rebound/guarding; No hepatosplenomegaly  MUSCULOSKELETAL:  LLE s/p ORIF; no clubbing or cyanosis of digits; no joint swelling or tenderness to palpation  PSYCH: A+O to person, place, and time; affect appropriate  SKIN: No rashes; no palpable lesions    LABS:                        7.7    7.59  )-----------( 164      ( 07 Jun 2023 20:45 )             23.2     06-07    133<L>  |  102  |  27<H>  ----------------------------<  120<H>  4.0   |  22  |  1.0    Ca    7.7<L>      07 Jun 2023 20:45  Phos  2.8     06-07  Mg     2.0     06-07                  RADIOLOGY & ADDITIONAL TESTS:  Results Reviewed:   Imaging Personally Reviewed:  Electrocardiogram Personally Reviewed:    COORDINATION OF CARE:  Care Discussed with Consultants/Other Providers [Y/N]:  Prior or Outpatient Records Reviewed [Y/N]:         Patient is a 78y old  Female who presents with a chief complaint of Left femur fracture (07 Jun 2023 06:25)      SUBJECTIVE / OVERNIGHT EVENTS: Pt is stable. She is complaining of congestion and cough associated with yellow phlegm. Lungs are clear on auscultation and she is not coughing while eating.   ADDITIONAL REVIEW OF SYSTEMS: as above    MEDICATIONS  (STANDING):  acetaminophen     Tablet .. 650 milliGRAM(s) Oral every 6 hours  aspirin  chewable 81 milliGRAM(s) Oral daily  atorvastatin 20 milliGRAM(s) Oral at bedtime  calcium carbonate 1250 mG  + Vitamin D (OsCal 500 + D) 1 Tablet(s) Oral daily  chlorhexidine 2% Cloths 1 Application(s) Topical <User Schedule>  enoxaparin Injectable 40 milliGRAM(s) SubCutaneous every 24 hours  gabapentin 300 milliGRAM(s) Oral every 8 hours  hydrochlorothiazide 12.5 milliGRAM(s) Oral daily  insulin lispro (ADMELOG) corrective regimen sliding scale   SubCutaneous Before meals and at bedtime  metoprolol tartrate 25 milliGRAM(s) Oral every 12 hours  multivitamin 1 Tablet(s) Oral daily    MEDICATIONS  (PRN):  ketorolac   Injectable 15 milliGRAM(s) IV Push every 6 hours PRN Severe Pain (7 - 10)      CAPILLARY BLOOD GLUCOSE      POCT Blood Glucose.: 120 mg/dL (08 Jun 2023 07:17)  POCT Blood Glucose.: 135 mg/dL (07 Jun 2023 21:33)  POCT Blood Glucose.: 125 mg/dL (07 Jun 2023 17:17)  POCT Blood Glucose.: 172 mg/dL (07 Jun 2023 11:33)    I&O's Summary    07 Jun 2023 07:01  -  08 Jun 2023 07:00  --------------------------------------------------------  IN: 0 mL / OUT: 780 mL / NET: -780 mL        PHYSICAL EXAM:  Vital Signs Last 24 Hrs  T(C): 36.6 (08 Jun 2023 08:38), Max: 36.6 (07 Jun 2023 12:00)  T(F): 97.9 (08 Jun 2023 08:38), Max: 97.9 (08 Jun 2023 08:38)  HR: 68 (08 Jun 2023 08:38) (68 - 88)  BP: 127/58 (08 Jun 2023 08:38) (103/64 - 159/72)  BP(mean): 81 (07 Jun 2023 20:00) (70 - 109)  RR: 18 (08 Jun 2023 08:38) (12 - 24)  SpO2: 98% (08 Jun 2023 08:38) (98% - 100%)    Parameters below as of 07 Jun 2023 20:00  Patient On (Oxygen Delivery Method): room air      CONSTITUTIONAL: NAD  ENMT: Moist oral mucosa, no pharyngeal injection or exudates; normal dentition  NECK: Supple, no palpable masses; no thyromegaly  RESPIRATORY: Normal respiratory effort; lungs are clear to auscultation bilaterally  CARDIOVASCULAR: Regular rate and rhythm, normal S1 and S2, no murmur/rub/gallop; No lower extremity edema; Peripheral pulses are 2+ bilaterally  ABDOMEN: Nontender to palpation, normoactive bowel sounds, no rebound/guarding; No hepatosplenomegaly  MUSCULOSKELETAL:  LLE s/p ORIF; no clubbing or cyanosis of digits; no joint swelling or tenderness to palpation  PSYCH: A+O to person, place, and time; affect appropriate  SKIN: No rashes; no palpable lesions    LABS:                        7.7    7.59  )-----------( 164      ( 07 Jun 2023 20:45 )             23.2     06-07    133<L>  |  102  |  27<H>  ----------------------------<  120<H>  4.0   |  22  |  1.0    Ca    7.7<L>      07 Jun 2023 20:45  Phos  2.8     06-07  Mg     2.0     06-07                  RADIOLOGY & ADDITIONAL TESTS:  Results Reviewed:   Imaging Personally Reviewed:  Electrocardiogram Personally Reviewed:    COORDINATION OF CARE:  Care Discussed with Consultants/Other Providers [Y/N]:  Prior or Outpatient Records Reviewed [Y/N]:

## 2023-06-10 NOTE — CONSULT NOTE ADULT - ASSESSMENT
78F HTN/HLD, CAD, AS s/p TAVR, admitted 6/3/23 mechanical fall found to have left femur fracture now s/p ORIF 6/4/23 c/b post-op hypotension requiring SICU, stabilized, and then downgraded to the floor.    #L femur fracture s/p ORIF w/intramedullary hip screw  #postop hypotension requiring the use of vasopressors  - no longer on pressors  - hold home antihypertensives (amlodipine, lisinopril, chlorthalidone)  - keep Hb >8 g/dL given cardiac history   - pt is already s/p 4 units prbc  - titrate down pain medications and monitor for lethargy  - continue incentive spirometry  - Echo (6/4): EF 69%; G1DD  - Post-op EKG (6/4): Sinus rhythm with 1st degree AVB   - PPM interrogation noted brief NSVT  - continue on aspirin 325mg qd for 30 days postop as per ortho  - ISS  - continue statin  - Corrected calcium is still low at 7.8; please replenish; obtain a cmp tomorrow to check albumin  - pending an ischemic workup prior to discharge for NSVTs  - dispo planning as per trauma   78F HTN/HLD, CAD, AS s/p TAVR, admitted 6/3/23 mechanical fall found to have left femur fracture now s/p ORIF 6/4/23 c/b post-op hypotension requiring SICU, stabilized, and then downgraded to the floor.    #L femur fracture s/p ORIF w/intramedullary hip screw  #postop hypotension requiring the use of vasopressors  - no longer on pressors  - hold home antihypertensives (amlodipine, lisinopril, chlorthalidone)  - keep Hb >8 g/dL given cardiac history   - pt is already s/p 4 units prbc  - titrate down pain medications and monitor for lethargy  - continue incentive spirometry  - continue on aspirin 325mg qd for 30 days postop as per ortho  - ISS  - continue statin  - Corrected calcium is still low at 7.8; please replenish; obtain a cmp tomorrow to check albumin  - dispo planning as per trauma    #CAD s/p stents  - Echo (6/4): EF 69%; G1DD  - Post-op EKG (6/4): Sinus rhythm with 1st degree AVB   - PPM interrogation noted brief NSVT  - pending an ischemic workup prior to discharge for NSVT    #cough  - cough; likely reactive;   - monitor fever and wbc curve  - can get CXR if phlegm changes to green or darker, signs of infections, or positive auscultation   - none of which are currently present so would monitor off abx

## 2023-06-11 LAB
BASOPHILS # BLD AUTO: 0.05 K/UL — SIGNIFICANT CHANGE UP (ref 0–0.2)
BASOPHILS NFR BLD AUTO: 0.7 % — SIGNIFICANT CHANGE UP (ref 0–1)
BUN SERPL-MCNC: 30 MG/DL — HIGH (ref 10–20)
CALCIUM SERPL-MCNC: 8.4 MG/DL — SIGNIFICANT CHANGE UP (ref 8.4–10.5)
CO2 SERPL-SCNC: 23 MMOL/L — SIGNIFICANT CHANGE UP (ref 17–32)
CREAT SERPL-MCNC: 0.8 MG/DL — SIGNIFICANT CHANGE UP (ref 0.7–1.5)
EGFR: 75 ML/MIN/1.73M2 — SIGNIFICANT CHANGE UP
EOSINOPHIL # BLD AUTO: 0.3 K/UL — SIGNIFICANT CHANGE UP (ref 0–0.7)
EOSINOPHIL NFR BLD AUTO: 4.3 % — SIGNIFICANT CHANGE UP (ref 0–8)
GLUCOSE BLDC GLUCOMTR-MCNC: 111 MG/DL — HIGH (ref 70–99)
GLUCOSE BLDC GLUCOMTR-MCNC: 133 MG/DL — HIGH (ref 70–99)
GLUCOSE BLDC GLUCOMTR-MCNC: 190 MG/DL — HIGH (ref 70–99)
GLUCOSE BLDC GLUCOMTR-MCNC: 98 MG/DL — SIGNIFICANT CHANGE UP (ref 70–99)
GLUCOSE SERPL-MCNC: 116 MG/DL — HIGH (ref 70–99)
HCT VFR BLD CALC: 25.5 % — LOW (ref 37–47)
HGB BLD-MCNC: 8.4 G/DL — LOW (ref 12–16)
IMM GRANULOCYTES NFR BLD AUTO: 1.3 % — HIGH (ref 0.1–0.3)
INR BLD: 0.97 RATIO — SIGNIFICANT CHANGE UP (ref 0.65–1.3)
LYMPHOCYTES # BLD AUTO: 1.39 K/UL — SIGNIFICANT CHANGE UP (ref 1.2–3.4)
LYMPHOCYTES # BLD AUTO: 19.9 % — LOW (ref 20.5–51.1)
MAGNESIUM SERPL-MCNC: 1.7 MG/DL — LOW (ref 1.8–2.4)
MCHC RBC-ENTMCNC: 30.8 PG — SIGNIFICANT CHANGE UP (ref 27–31)
MCHC RBC-ENTMCNC: 32.9 G/DL — SIGNIFICANT CHANGE UP (ref 32–37)
MCV RBC AUTO: 93.4 FL — SIGNIFICANT CHANGE UP (ref 81–99)
MONOCYTES # BLD AUTO: 0.87 K/UL — HIGH (ref 0.1–0.6)
MONOCYTES NFR BLD AUTO: 12.5 % — HIGH (ref 1.7–9.3)
NEUTROPHILS # BLD AUTO: 4.27 K/UL — SIGNIFICANT CHANGE UP (ref 1.4–6.5)
NEUTROPHILS NFR BLD AUTO: 61.3 % — SIGNIFICANT CHANGE UP (ref 42.2–75.2)
NRBC # BLD: 0 /100 WBCS — SIGNIFICANT CHANGE UP (ref 0–0)
PHOSPHATE SERPL-MCNC: 3.5 MG/DL — SIGNIFICANT CHANGE UP (ref 2.1–4.9)
PLATELET # BLD AUTO: 312 K/UL — SIGNIFICANT CHANGE UP (ref 130–400)
PMV BLD: 9.8 FL — SIGNIFICANT CHANGE UP (ref 7.4–10.4)
PROTHROM AB SERPL-ACNC: 11 SEC — SIGNIFICANT CHANGE UP (ref 9.95–12.87)
RBC # BLD: 2.73 M/UL — LOW (ref 4.2–5.4)
RBC # FLD: 16.2 % — HIGH (ref 11.5–14.5)
WBC # BLD: 6.97 K/UL — SIGNIFICANT CHANGE UP (ref 4.8–10.8)
WBC # FLD AUTO: 6.97 K/UL — SIGNIFICANT CHANGE UP (ref 4.8–10.8)

## 2023-06-11 PROCEDURE — 99232 SBSQ HOSP IP/OBS MODERATE 35: CPT | Mod: 25,24

## 2023-06-11 PROCEDURE — 99233 SBSQ HOSP IP/OBS HIGH 50: CPT

## 2023-06-11 RX ORDER — MAGNESIUM SULFATE 500 MG/ML
2 VIAL (ML) INJECTION ONCE
Refills: 0 | Status: COMPLETED | OUTPATIENT
Start: 2023-06-11 | End: 2023-06-11

## 2023-06-11 RX ADMIN — GABAPENTIN 300 MILLIGRAM(S): 400 CAPSULE ORAL at 21:16

## 2023-06-11 RX ADMIN — Medication 25 GRAM(S): at 05:11

## 2023-06-11 RX ADMIN — CHLORHEXIDINE GLUCONATE 1 APPLICATION(S): 213 SOLUTION TOPICAL at 05:12

## 2023-06-11 RX ADMIN — GABAPENTIN 300 MILLIGRAM(S): 400 CAPSULE ORAL at 07:53

## 2023-06-11 RX ADMIN — Medication 25 MILLIGRAM(S): at 18:04

## 2023-06-11 RX ADMIN — Medication 650 MILLIGRAM(S): at 23:20

## 2023-06-11 RX ADMIN — METHOCARBAMOL 750 MILLIGRAM(S): 500 TABLET, FILM COATED ORAL at 21:17

## 2023-06-11 RX ADMIN — Medication 2: at 11:16

## 2023-06-11 RX ADMIN — Medication 25 MILLIGRAM(S): at 05:12

## 2023-06-11 RX ADMIN — ATORVASTATIN CALCIUM 20 MILLIGRAM(S): 80 TABLET, FILM COATED ORAL at 21:16

## 2023-06-11 RX ADMIN — Medication 1 TABLET(S): at 11:09

## 2023-06-11 RX ADMIN — GABAPENTIN 300 MILLIGRAM(S): 400 CAPSULE ORAL at 18:05

## 2023-06-11 RX ADMIN — GABAPENTIN 300 MILLIGRAM(S): 400 CAPSULE ORAL at 15:52

## 2023-06-11 RX ADMIN — Medication 650 MILLIGRAM(S): at 18:05

## 2023-06-11 RX ADMIN — METHOCARBAMOL 750 MILLIGRAM(S): 500 TABLET, FILM COATED ORAL at 15:58

## 2023-06-11 RX ADMIN — Medication 650 MILLIGRAM(S): at 05:12

## 2023-06-11 RX ADMIN — ENOXAPARIN SODIUM 40 MILLIGRAM(S): 100 INJECTION SUBCUTANEOUS at 11:08

## 2023-06-11 RX ADMIN — GABAPENTIN 300 MILLIGRAM(S): 400 CAPSULE ORAL at 11:08

## 2023-06-11 RX ADMIN — METHOCARBAMOL 750 MILLIGRAM(S): 500 TABLET, FILM COATED ORAL at 05:12

## 2023-06-11 RX ADMIN — Medication 1 TABLET(S): at 11:44

## 2023-06-11 RX ADMIN — Medication 325 MILLIGRAM(S): at 11:07

## 2023-06-11 RX ADMIN — Medication 15 MILLIGRAM(S): at 03:28

## 2023-06-11 NOTE — CONSULT NOTE ADULT - ASSESSMENT
78F HTN/HLD, CAD, AS s/p TAVR, admitted 6/3/23 mechanical fall found to have left femur fracture now s/p ORIF 6/4/23 c/b post-op hypotension requiring SICU, stabilized, and then downgraded to the floor.    #L femur fracture s/p ORIF w/intramedullary hip screw  #postop hypotension requiring the use of vasopressors  - no longer on pressors  - hold home antihypertensives (amlodipine, lisinopril, chlorthalidone)  - keep Hb >8 g/dL given cardiac history   - pt is already s/p 4 units prbc  - titrate down pain medications and monitor for lethargy  - continue incentive spirometry  - continue on aspirin 325mg qd for 30 days postop as per ortho  - ISS  - continue statin  - Corrected calcium is still low at 7.8; please replenish; obtain a cmp tomorrow to check albumin  - dispo planning as per trauma    #CAD s/p stents  - Echo (6/4): EF 69%; G1DD  - Post-op EKG (6/4): Sinus rhythm with 1st degree AVB   - PPM interrogation noted brief NSVT  - pending an ischemic workup prior to discharge for NSVT  - obtain Mg and phos as well and keep electrolytes corrected to K >4, Phos >3, Mg >2    #cough  - cough; likely reactive;   - monitor fever and wbc curve  - can get CXR if phlegm changes to green or darker, signs of infections, or positive auscultation   - none of which are currently present so would monitor off abx   78F HTN/HLD, CAD, AS s/p TAVR, admitted 6/3/23 mechanical fall found to have left femur fracture now s/p ORIF 6/4/23 c/b post-op hypotension requiring SICU, stabilized, and then downgraded to the floor.    #L femur fracture s/p ORIF w/intramedullary hip screw  #postop hypotension requiring the use of vasopressors  - no longer on pressors  - hold home antihypertensives (amlodipine, lisinopril, chlorthalidone)  - keep Hb >8 g/dL given cardiac history   - pt is already s/p 4 units prbc  - titrate down pain medications and monitor for lethargy  - continue incentive spirometry  - continue on aspirin 325mg qd for 30 days postop as per ortho  - ISS  - continue statin  - Corrected calcium is 8.1 so please replenish  - dispo planning as per trauma    #CAD s/p stents  - Echo (6/4): EF 69%; G1DD  - Post-op EKG (6/4): Sinus rhythm with 1st degree AVB   - PPM interrogation noted brief NSVT  - pending an ischemic workup prior to discharge for NSVT  - obtain Mg and phos as well and keep electrolytes corrected to K >4, Phos >3, Mg >2

## 2023-06-11 NOTE — CONSULT NOTE ADULT - SUBJECTIVE AND OBJECTIVE BOX
Patient is a 78y old  Female who presents with a chief complaint of Left femur fracture (07 Jun 2023 06:25)      SUBJECTIVE / OVERNIGHT EVENTS:   ADDITIONAL REVIEW OF SYSTEMS: as above    MEDICATIONS  (STANDING):  acetaminophen     Tablet .. 650 milliGRAM(s) Oral every 6 hours  aspirin  chewable 81 milliGRAM(s) Oral daily  atorvastatin 20 milliGRAM(s) Oral at bedtime  calcium carbonate 1250 mG  + Vitamin D (OsCal 500 + D) 1 Tablet(s) Oral daily  chlorhexidine 2% Cloths 1 Application(s) Topical <User Schedule>  enoxaparin Injectable 40 milliGRAM(s) SubCutaneous every 24 hours  gabapentin 300 milliGRAM(s) Oral every 8 hours  hydrochlorothiazide 12.5 milliGRAM(s) Oral daily  insulin lispro (ADMELOG) corrective regimen sliding scale subCutaneous Before meals and at bedtime  metoprolol tartrate 25 milliGRAM(s) Oral every 12 hours  multivitamin 1 Tablet(s) Oral daily    MEDICATIONS  (PRN):  ketorolac   Injectable 15 milliGRAM(s) IV Push every 6 hours PRN Severe Pain (7 - 10)      CAPILLARY BLOOD GLUCOSE      POCT Blood Glucose.: 120 mg/dL (08 Jun 2023 07:17)  POCT Blood Glucose.: 135 mg/dL (07 Jun 2023 21:33)  POCT Blood Glucose.: 125 mg/dL (07 Jun 2023 17:17)  POCT Blood Glucose.: 172 mg/dL (07 Jun 2023 11:33)    I&O's Summary    07 Jun 2023 07:01  -  08 Jun 2023 07:00  --------------------------------------------------------  IN: 0 mL / OUT: 780 mL / NET: -780 mL        PHYSICAL EXAM:  Vital Signs Last 24 Hrs  T(C): 36.6 (08 Jun 2023 08:38), Max: 36.6 (07 Jun 2023 12:00)  T(F): 97.9 (08 Jun 2023 08:38), Max: 97.9 (08 Jun 2023 08:38)  HR: 68 (08 Jun 2023 08:38) (68 - 88)  BP: 127/58 (08 Jun 2023 08:38) (103/64 - 159/72)  BP(mean): 81 (07 Jun 2023 20:00) (70 - 109)  RR: 18 (08 Jun 2023 08:38) (12 - 24)  SpO2: 98% (08 Jun 2023 08:38) (98% - 100%)    Parameters below as of 07 Jun 2023 20:00  Patient On (Oxygen Delivery Method): room air      CONSTITUTIONAL: NAD  ENMT: Moist oral mucosa, no pharyngeal injection or exudates; normal dentition  NECK: Supple, no palpable masses; no thyromegaly  RESPIRATORY: Normal respiratory effort; lungs are clear to auscultation bilaterally  CARDIOVASCULAR: Regular rate and rhythm, normal S1 and S2, no murmur/rub/gallop; No lower extremity edema; Peripheral pulses are 2+ bilaterally  ABDOMEN: Nontender to palpation, normoactive bowel sounds, no rebound/guarding; No hepatosplenomegaly  MUSCULOSKELETAL:  LLE s/p ORIF; no clubbing or cyanosis of digits; no joint swelling or tenderness to palpation  PSYCH: A+O to person, place, and time; affect appropriate  SKIN: No rashes; no palpable lesions    LABS:                        7.7    7.59  )-----------( 164      ( 07 Jun 2023 20:45 )             23.2     06-07    133<L>  |  102  |  27<H>  ----------------------------<  120<H>  4.0   |  22  |  1.0    Ca    7.7<L>      07 Jun 2023 20:45  Phos  2.8     06-07  Mg     2.0     06-07                  RADIOLOGY & ADDITIONAL TESTS:  Results Reviewed:   Imaging Personally Reviewed:  Electrocardiogram Personally Reviewed:    COORDINATION OF CARE:  Care Discussed with Consultants/Other Providers [Y/N]:  Prior or Outpatient Records Reviewed [Y/N]:         Patient is a 78y old  Female who presents with a chief complaint of Left femur fracture (07 Jun 2023 06:25)      SUBJECTIVE / OVERNIGHT EVENTS: No longer complaining of congestion/cough. Just a chronic PND. No other issues. No cardiac complaints.   ADDITIONAL REVIEW OF SYSTEMS: as above    MEDICATIONS  (STANDING):  acetaminophen     Tablet .. 650 milliGRAM(s) Oral every 6 hours  aspirin  chewable 81 milliGRAM(s) Oral daily  atorvastatin 20 milliGRAM(s) Oral at bedtime  calcium carbonate 1250 mG  + Vitamin D (OsCal 500 + D) 1 Tablet(s) Oral daily  chlorhexidine 2% Cloths 1 Application(s) Topical <User Schedule>  enoxaparin Injectable 40 milliGRAM(s) SubCutaneous every 24 hours  gabapentin 300 milliGRAM(s) Oral every 8 hours  hydrochlorothiazide 12.5 milliGRAM(s) Oral daily  insulin lispro (ADMELOG) corrective regimen sliding scale subCutaneous Before meals and at bedtime  metoprolol tartrate 25 milliGRAM(s) Oral every 12 hours  multivitamin 1 Tablet(s) Oral daily    MEDICATIONS  (PRN):  ketorolac   Injectable 15 milliGRAM(s) IV Push every 6 hours PRN Severe Pain (7 - 10)      CAPILLARY BLOOD GLUCOSE      POCT Blood Glucose.: 120 mg/dL (08 Jun 2023 07:17)  POCT Blood Glucose.: 135 mg/dL (07 Jun 2023 21:33)  POCT Blood Glucose.: 125 mg/dL (07 Jun 2023 17:17)  POCT Blood Glucose.: 172 mg/dL (07 Jun 2023 11:33)    I&O's Summary    07 Jun 2023 07:01  -  08 Jun 2023 07:00  --------------------------------------------------------  IN: 0 mL / OUT: 780 mL / NET: -780 mL        PHYSICAL EXAM:  Vital Signs Last 24 Hrs  T(C): 36.6 (08 Jun 2023 08:38), Max: 36.6 (07 Jun 2023 12:00)  T(F): 97.9 (08 Jun 2023 08:38), Max: 97.9 (08 Jun 2023 08:38)  HR: 68 (08 Jun 2023 08:38) (68 - 88)  BP: 127/58 (08 Jun 2023 08:38) (103/64 - 159/72)  BP(mean): 81 (07 Jun 2023 20:00) (70 - 109)  RR: 18 (08 Jun 2023 08:38) (12 - 24)  SpO2: 98% (08 Jun 2023 08:38) (98% - 100%)    Parameters below as of 07 Jun 2023 20:00  Patient On (Oxygen Delivery Method): room air      CONSTITUTIONAL: NAD  ENMT: Moist oral mucosa, no pharyngeal injection or exudates; normal dentition  NECK: Supple, no palpable masses; no thyromegaly  RESPIRATORY: Normal respiratory effort; lungs are clear to auscultation bilaterally  CARDIOVASCULAR: Regular rate and rhythm, normal S1 and S2, no murmur/rub/gallop; No lower extremity edema; Peripheral pulses are 2+ bilaterally  ABDOMEN: Nontender to palpation, normoactive bowel sounds, no rebound/guarding; No hepatosplenomegaly  MUSCULOSKELETAL:  LLE s/p ORIF; no clubbing or cyanosis of digits; no joint swelling or tenderness to palpation  PSYCH: A+O to person, place, and time; affect appropriate  SKIN: No rashes; no palpable lesions    LABS:                        7.7    7.59  )-----------( 164      ( 07 Jun 2023 20:45 )             23.2     06-07    133<L>  |  102  |  27<H>  ----------------------------<  120<H>  4.0   |  22  |  1.0    Ca    7.7<L>      07 Jun 2023 20:45  Phos  2.8     06-07  Mg     2.0     06-07                  RADIOLOGY & ADDITIONAL TESTS:  Results Reviewed:   Imaging Personally Reviewed:  Electrocardiogram Personally Reviewed:    COORDINATION OF CARE:  Care Discussed with Consultants/Other Providers [Y/N]:  Prior or Outpatient Records Reviewed [Y/N]:

## 2023-06-11 NOTE — PROGRESS NOTE ADULT - SUBJECTIVE AND OBJECTIVE BOX
GENERAL SURGERY PROGRESS NOTE    Patient: RAFAEL NO , 78y ([birth year])Female   MRN: [id number]  Location: [address] ([address]) [address]  Visit: 06-03-23 Inpatient  Date: 06-11-23 @ 15:27      Procedure/Dx/Injuries: left femur fx s/p left ORIF    Events of past 24 hours: pending auth for eger, tolerating diet    PAST MEDICAL & SURGICAL HISTORY:  Hypertension      HLD (hyperlipidemia)      CAD (coronary artery disease)      OA (osteoarthritis)      Compression fracture of spine  2015 lumbar      Aortic stenosis      Compression fracture of spine      Type 2 diabetes mellitus  diet controlled      OAB (overactive bladder)      Transient ischemic attack (TIA)  Jan 2014- with residual weakness on right leg      Falls      Neuropathy      History of coronary artery stent placement  KILEY to mLAD (Jan 2021)      History of cataract surgery      H/O oral surgery      History of complete heart block  MDT dual chamber 3/11/2021          Vitals:   T(F): 98.3 (06-11-23 @ 12:30), Max: 98.3 (06-11-23 @ 12:30)  HR: 78 (06-11-23 @ 12:30)  BP: 123/61 (06-11-23 @ 12:30)  RR: 18 (06-11-23 @ 12:30)  SpO2: 97% (06-11-23 @ 12:30)      Diet, Regular:   Consistent Carbohydrate Evening Snack  DASH/TLC Sodium & Cholesterol Restricted      Fluids:     I & O's:    06-10-23 @ 07:01  -  06-11-23 @ 07:00  --------------------------------------------------------  IN:  Total IN: 0 mL    OUT:    Voided (mL): 1800 mL  Total OUT: 1800 mL    Total NET: -1800 mL          PHYSICAL EXAM:  PHYSICAL EXAM:  GENERAL: No acute distress, well-developed  CHEST/LUNG: CTAB; No wheezes, rales, or rhonchi  HEART: Regular rate and rhythm.   ABDOMEN: Soft, non-tender, non-distended  EXTREMITIES: L hip wound dressing in place, No clubbing, cyanosis, or edema  NEUROLOGY: A&O x 3, no focal deficits  SKIN: No rashes or lesions    MEDICATIONS  (STANDING):  acetaminophen     Tablet .. 650 milliGRAM(s) Oral every 6 hours  aspirin 325 milliGRAM(s) Oral daily  atorvastatin 20 milliGRAM(s) Oral at bedtime  calcium carbonate 1250 mG  + Vitamin D (OsCal 500 + D) 1 Tablet(s) Oral daily  chlorhexidine 2% Cloths 1 Application(s) Topical <User Schedule>  enoxaparin Injectable 40 milliGRAM(s) SubCutaneous every 24 hours  gabapentin 300 milliGRAM(s) Oral <User Schedule>  hydrochlorothiazide 12.5 milliGRAM(s) Oral daily  insulin lispro (ADMELOG) corrective regimen sliding scale   SubCutaneous Before meals and at bedtime  methocarbamol 750 milliGRAM(s) Oral three times a day  metoprolol tartrate 25 milliGRAM(s) Oral every 12 hours  multivitamin 1 Tablet(s) Oral daily    MEDICATIONS  (PRN):  ketorolac   Injectable 15 milliGRAM(s) IV Push every 8 hours PRN Severe Pain (7 - 10)  melatonin 3 milliGRAM(s) Oral at bedtime PRN Insomnia      DVT PROPHYLAXIS: enoxaparin Injectable 40 milliGRAM(s) SubCutaneous every 24 hours    GI PROPHYLAXIS:   ANTICOAGULATION:   ANTIBIOTICS:            LAB/STUDIES:  Labs:  CAPILLARY BLOOD GLUCOSE      POCT Blood Glucose.: 190 mg/dL (11 Jun 2023 11:15)  POCT Blood Glucose.: 111 mg/dL (11 Jun 2023 07:49)  POCT Blood Glucose.: 128 mg/dL (10 Ed 2023 21:09)  POCT Blood Glucose.: 113 mg/dL (10 Ed 2023 16:54)                          8.3    7.63  )-----------( 275      ( 10 Ed 2023 20:40 )             25.6       Auto Neutrophil %: 62.4 % (06-10-23 @ 20:40)  Auto Immature Granulocyte %: 2.0 % (06-10-23 @ 20:40)    06-10    134<L>  |  99  |  33<H>  ----------------------------<  111<H>  4.5   |  23  |  0.9      Calcium, Total Serum: 7.3 mg/dL (06-10-23 @ 20:40)      LFTs:             4.7  | 0.9  | 44       ------------------[74      ( 10 Ed 2023 20:40 )  3.0  | x    | 27          Lipase:x      Amylase:x             Coags:                        IMAGING:      ACCESS/ DEVICES:  [x ] Peripheral IV  [ ] Central Venous Line	[ ] R	[ ] L	[ ] IJ	[ ] Fem	[ ] SC	Placed:   [ ] Arterial Line		[ ] R	[ ] L	[ ] Fem	[ ] Rad	[ ] Ax	Placed:   [ ] PICC:					[ ] Mediport  [ ] Urinary Catheter,  Date Placed:   [ ] Chest tube: [ ] Right, [ ] Left  [ ] GRANT/Ghassan Drains

## 2023-06-11 NOTE — PROGRESS NOTE ADULT - ASSESSMENT
78F HTN/HLD, CAD, AS s/p TAVR, admitted 6/3/23 mechanical fall found to have left femur fracture now s/p ORIF 6/4/23 c/b post-op hypotension requiring SICU admission, now DG to floor.     PLAN:  #L femur fracture s/p ORIF w/intramedullar hip screw  -Regular diet  -Hgb >8 due to cardiac hx  -Left Lower Extremity:  Weight Bearing As Tolerated   -EP recommended ischemic work up while inpatinet. Patient's cardiologist agreed with EP.   -Activity - Ambulate as Tolerated   -Ortho recs: d/c patient on 6 weeks of  daily if no contraindications   -PT recs: acute inpatient rehab  -OT recs: rehab   -CM x dispo    spectra [phone]

## 2023-06-12 LAB
ANION GAP SERPL CALC-SCNC: 11 MMOL/L — SIGNIFICANT CHANGE UP (ref 7–14)
ANION GAP SERPL CALC-SCNC: 9 MMOL/L — SIGNIFICANT CHANGE UP (ref 7–14)
BASOPHILS # BLD AUTO: 0.05 K/UL — SIGNIFICANT CHANGE UP (ref 0–0.2)
BASOPHILS NFR BLD AUTO: 0.6 % — SIGNIFICANT CHANGE UP (ref 0–1)
BLD GP AB SCN SERPL QL: SIGNIFICANT CHANGE UP
BUN SERPL-MCNC: 29 MG/DL — HIGH (ref 10–20)
CALCIUM SERPL-MCNC: 8.4 MG/DL — SIGNIFICANT CHANGE UP (ref 8.4–10.5)
CHLORIDE SERPL-SCNC: 100 MMOL/L — SIGNIFICANT CHANGE UP (ref 98–110)
CHLORIDE SERPL-SCNC: 98 MMOL/L — SIGNIFICANT CHANGE UP (ref 98–110)
CO2 SERPL-SCNC: 25 MMOL/L — SIGNIFICANT CHANGE UP (ref 17–32)
CREAT SERPL-MCNC: 0.8 MG/DL — SIGNIFICANT CHANGE UP (ref 0.7–1.5)
EGFR: 75 ML/MIN/1.73M2 — SIGNIFICANT CHANGE UP
EOSINOPHIL # BLD AUTO: 0.36 K/UL — SIGNIFICANT CHANGE UP (ref 0–0.7)
EOSINOPHIL NFR BLD AUTO: 4.4 % — SIGNIFICANT CHANGE UP (ref 0–8)
GLUCOSE BLDC GLUCOMTR-MCNC: 104 MG/DL — HIGH (ref 70–99)
GLUCOSE BLDC GLUCOMTR-MCNC: 113 MG/DL — HIGH (ref 70–99)
GLUCOSE BLDC GLUCOMTR-MCNC: 131 MG/DL — HIGH (ref 70–99)
GLUCOSE BLDC GLUCOMTR-MCNC: 230 MG/DL — HIGH (ref 70–99)
GLUCOSE SERPL-MCNC: 120 MG/DL — HIGH (ref 70–99)
HCT VFR BLD CALC: 26.9 % — LOW (ref 37–47)
HGB BLD-MCNC: 8.8 G/DL — LOW (ref 12–16)
IMM GRANULOCYTES NFR BLD AUTO: 1.2 % — HIGH (ref 0.1–0.3)
LYMPHOCYTES # BLD AUTO: 1.4 K/UL — SIGNIFICANT CHANGE UP (ref 1.2–3.4)
LYMPHOCYTES # BLD AUTO: 17 % — LOW (ref 20.5–51.1)
MAGNESIUM SERPL-MCNC: 2.1 MG/DL — SIGNIFICANT CHANGE UP (ref 1.8–2.4)
MCHC RBC-ENTMCNC: 31.2 PG — HIGH (ref 27–31)
MCHC RBC-ENTMCNC: 32.7 G/DL — SIGNIFICANT CHANGE UP (ref 32–37)
MCV RBC AUTO: 95.4 FL — SIGNIFICANT CHANGE UP (ref 81–99)
MONOCYTES # BLD AUTO: 0.93 K/UL — HIGH (ref 0.1–0.6)
MONOCYTES NFR BLD AUTO: 11.3 % — HIGH (ref 1.7–9.3)
NEUTROPHILS # BLD AUTO: 5.41 K/UL — SIGNIFICANT CHANGE UP (ref 1.4–6.5)
NEUTROPHILS NFR BLD AUTO: 65.5 % — SIGNIFICANT CHANGE UP (ref 42.2–75.2)
NRBC # BLD: 0 /100 WBCS — SIGNIFICANT CHANGE UP (ref 0–0)
PHOSPHATE SERPL-MCNC: 3.1 MG/DL — SIGNIFICANT CHANGE UP (ref 2.1–4.9)
PLATELET # BLD AUTO: 353 K/UL — SIGNIFICANT CHANGE UP (ref 130–400)
PMV BLD: 9.6 FL — SIGNIFICANT CHANGE UP (ref 7.4–10.4)
POTASSIUM SERPL-MCNC: 4 MMOL/L — SIGNIFICANT CHANGE UP (ref 3.5–5)
POTASSIUM SERPL-MCNC: 4.6 MMOL/L — SIGNIFICANT CHANGE UP (ref 3.5–5)
POTASSIUM SERPL-SCNC: 4 MMOL/L — SIGNIFICANT CHANGE UP (ref 3.5–5)
POTASSIUM SERPL-SCNC: 4.6 MMOL/L — SIGNIFICANT CHANGE UP (ref 3.5–5)
RBC # BLD: 2.82 M/UL — LOW (ref 4.2–5.4)
RBC # FLD: 16.6 % — HIGH (ref 11.5–14.5)
SARS-COV-2 RNA SPEC QL NAA+PROBE: SIGNIFICANT CHANGE UP
SODIUM SERPL-SCNC: 132 MMOL/L — LOW (ref 135–146)
SODIUM SERPL-SCNC: 134 MMOL/L — LOW (ref 135–146)
WBC # BLD: 8.25 K/UL — SIGNIFICANT CHANGE UP (ref 4.8–10.8)
WBC # FLD AUTO: 8.25 K/UL — SIGNIFICANT CHANGE UP (ref 4.8–10.8)

## 2023-06-12 PROCEDURE — 99232 SBSQ HOSP IP/OBS MODERATE 35: CPT

## 2023-06-12 RX ORDER — KETOROLAC TROMETHAMINE 30 MG/ML
15 SYRINGE (ML) INJECTION ONCE
Refills: 0 | Status: DISCONTINUED | OUTPATIENT
Start: 2023-06-12 | End: 2023-06-12

## 2023-06-12 RX ORDER — MAGNESIUM SULFATE 500 MG/ML
2 VIAL (ML) INJECTION ONCE
Refills: 0 | Status: COMPLETED | OUTPATIENT
Start: 2023-06-12 | End: 2023-06-12

## 2023-06-12 RX ORDER — REGADENOSON 0.08 MG/ML
0.4 INJECTION, SOLUTION INTRAVENOUS ONCE
Refills: 0 | Status: DISCONTINUED | OUTPATIENT
Start: 2023-06-12 | End: 2023-06-14

## 2023-06-12 RX ADMIN — GABAPENTIN 300 MILLIGRAM(S): 400 CAPSULE ORAL at 14:19

## 2023-06-12 RX ADMIN — METHOCARBAMOL 750 MILLIGRAM(S): 500 TABLET, FILM COATED ORAL at 14:18

## 2023-06-12 RX ADMIN — Medication 1 TABLET(S): at 14:19

## 2023-06-12 RX ADMIN — CHLORHEXIDINE GLUCONATE 1 APPLICATION(S): 213 SOLUTION TOPICAL at 06:52

## 2023-06-12 RX ADMIN — Medication 25 MILLIGRAM(S): at 05:15

## 2023-06-12 RX ADMIN — Medication 15 MILLIGRAM(S): at 02:29

## 2023-06-12 RX ADMIN — Medication 325 MILLIGRAM(S): at 14:19

## 2023-06-12 RX ADMIN — GABAPENTIN 300 MILLIGRAM(S): 400 CAPSULE ORAL at 18:10

## 2023-06-12 RX ADMIN — METHOCARBAMOL 750 MILLIGRAM(S): 500 TABLET, FILM COATED ORAL at 05:16

## 2023-06-12 RX ADMIN — Medication 4: at 12:07

## 2023-06-12 RX ADMIN — Medication 650 MILLIGRAM(S): at 05:15

## 2023-06-12 RX ADMIN — ENOXAPARIN SODIUM 40 MILLIGRAM(S): 100 INJECTION SUBCUTANEOUS at 14:20

## 2023-06-12 RX ADMIN — GABAPENTIN 300 MILLIGRAM(S): 400 CAPSULE ORAL at 08:42

## 2023-06-12 RX ADMIN — METHOCARBAMOL 750 MILLIGRAM(S): 500 TABLET, FILM COATED ORAL at 21:24

## 2023-06-12 RX ADMIN — Medication 15 MILLIGRAM(S): at 18:26

## 2023-06-12 RX ADMIN — GABAPENTIN 300 MILLIGRAM(S): 400 CAPSULE ORAL at 11:33

## 2023-06-12 RX ADMIN — Medication 15 MILLIGRAM(S): at 18:11

## 2023-06-12 RX ADMIN — GABAPENTIN 300 MILLIGRAM(S): 400 CAPSULE ORAL at 20:01

## 2023-06-12 RX ADMIN — Medication 650 MILLIGRAM(S): at 23:07

## 2023-06-12 RX ADMIN — Medication 25 GRAM(S): at 05:16

## 2023-06-12 RX ADMIN — Medication 1 TABLET(S): at 14:18

## 2023-06-12 RX ADMIN — Medication 650 MILLIGRAM(S): at 18:10

## 2023-06-12 RX ADMIN — Medication 650 MILLIGRAM(S): at 14:19

## 2023-06-12 RX ADMIN — ATORVASTATIN CALCIUM 20 MILLIGRAM(S): 80 TABLET, FILM COATED ORAL at 21:24

## 2023-06-12 RX ADMIN — Medication 25 MILLIGRAM(S): at 18:10

## 2023-06-12 NOTE — PROGRESS NOTE ADULT - SUBJECTIVE AND OBJECTIVE BOX
Patient is a 78y old  Female who presents with a chief complaint of Left femur fracture (12 Jun 2023 08:14)      OVERNIGHT EVENTS: remained stable  denies fever, chills, syncope, seizure, headache, cough, SOB, abd pain, N/V/D, urinary symptoms, legs swelling, sick contact or recent travel     SUBJECTIVE / INTERVAL HPI: Patient seen and examined at bedside.     VITAL SIGNS:  Vital Signs Last 24 Hrs  T(C): 36.4 (12 Jun 2023 12:30), Max: 37.2 (11 Jun 2023 21:22)  T(F): 97.6 (12 Jun 2023 12:30), Max: 98.9 (11 Jun 2023 21:22)  HR: 66 (12 Jun 2023 12:30) (66 - 77)  BP: 125/59 (12 Jun 2023 12:30) (123/60 - 152/65)  BP(mean): --  RR: 18 (12 Jun 2023 12:30) (18 - 18)  SpO2: 94% (12 Jun 2023 12:30) (94% - 97%)        PHYSICAL EXAM:    General: WDWN  HEENT: NC/AT; PERRL, clear conjunctiva  Neck: supple  Cardiovascular: +S1/S2; RRR  Respiratory: CTA b/l; no W/R/R  Gastrointestinal: soft, NT/ND; +BSx4  Extremities: WWP; 2+ peripheral pulses; no edema   Neurological: AAOx3; no focal deficits    MEDICATIONS:  MEDICATIONS  (STANDING):  acetaminophen     Tablet .. 650 milliGRAM(s) Oral every 6 hours  aspirin 325 milliGRAM(s) Oral daily  atorvastatin 20 milliGRAM(s) Oral at bedtime  calcium carbonate 1250 mG  + Vitamin D (OsCal 500 + D) 1 Tablet(s) Oral daily  chlorhexidine 2% Cloths 1 Application(s) Topical <User Schedule>  enoxaparin Injectable 40 milliGRAM(s) SubCutaneous every 24 hours  gabapentin 300 milliGRAM(s) Oral <User Schedule>  hydrochlorothiazide 12.5 milliGRAM(s) Oral daily  insulin lispro (ADMELOG) corrective regimen sliding scale   SubCutaneous Before meals and at bedtime  methocarbamol 750 milliGRAM(s) Oral three times a day  metoprolol tartrate 25 milliGRAM(s) Oral every 12 hours  multivitamin 1 Tablet(s) Oral daily  regadenoson Injectable 0.4 milliGRAM(s) IV Push once    MEDICATIONS  (PRN):  ketorolac   Injectable 15 milliGRAM(s) IV Push every 8 hours PRN Severe Pain (7 - 10)  melatonin 3 milliGRAM(s) Oral at bedtime PRN Insomnia      ALLERGIES:  Allergies    Vicodin (Vomiting; Nausea)  codeine (Unknown)    Intolerances        LABS:                        8.4    6.97  )-----------( 312      ( 11 Jun 2023 21:39 )             25.5     06-11    134<L>  |  100  |  30<H>  ----------------------------<  116<H>  4.6   |  23  |  0.8    Ca    8.4      11 Jun 2023 21:39  Phos  3.5     06-11  Mg     1.7     06-11    TPro  4.7<L>  /  Alb  3.0<L>  /  TBili  0.9  /  DBili  x   /  AST  44<H>  /  ALT  27  /  AlkPhos  74  06-10    PT/INR - ( 11 Jun 2023 21:39 )   PT: 11.00 sec;   INR: 0.97 ratio             CAPILLARY BLOOD GLUCOSE      POCT Blood Glucose.: 230 mg/dL (12 Jun 2023 11:32)      RADIOLOGY & ADDITIONAL TESTS: Reviewed.    ASSESSMENT:    PLAN:

## 2023-06-12 NOTE — CHART NOTE - NSCHARTNOTEFT_GEN_A_CORE
L Hip fracture s/p IMN, POD:8  Left hip dressings changed  distal incisions c/d/i; staples in place no drainage no erythema; re dressed with tegaderm and gauze  Proximal incision dressings with serous drainage; dressing removed, staples in place, mild erythema, no active drainage, popped fracture blisters; redressed with ABDs and paper tape  WBAT LLE   ortho following

## 2023-06-12 NOTE — PROGRESS NOTE ADULT - ASSESSMENT
79 y/o with hx of HTN, Diet controlled DM< HLD, CAD with KILEY mLAD 1/29/21, LS compression fracture, neuropathy, severe OA, s/p TAVR complicated by CHB s/p PPM in 3/2021, h/o TIA, admitted s/p  mechanical fall, found to have Left Subtrochanteric fracture, planned for OR with ortho 6/4. [facility] consulted for risk stratification    At baseline, patient lives at [facility], ambulates with a rollator    Closed Left Shaft Femur Fracture.,  s/p fall, S/P ORIF  Acute blood loss anemia.  episode of NSVT 5/28/23   CAD s/p PCI 2021  AS s/p TAVR  Moderate MS mean Gradient 5 in 2021  Significant concentric LVH with LVOT Obstruction peak Gradient 100   AS s/p TAVr, complicated by CHB s/p PPM 2021  Diet controlled DM  HTN  DLD        Plans:    - cardiac eval for ischemic work up, has NEELY and episode of NSVT as per interrogation by EP   - pain control, incentive spirometer PT eval   - c/w ASA, Lipitor, HCTZ and metoprolol   - avoid hypotension given Hx of Significant concentric LVH with LVOT Obstruction   - dvt ppx per primary team

## 2023-06-12 NOTE — CHART NOTE - NSCHARTNOTEFT_GEN_A_CORE
Registered Dietitian Follow-Up    Patient Profile Reviewed                           Yes [x]   No []  Nutrition History Previously Obtained        Yes [x]  No []      Pertinent Medical Information: Pt presented s/p fall and fracture of left femur, now s/p Open reduction and internal fixation of left hip using intramedullary hip screw 6/4.    Nutrition Interval History: Pt reports good appetite/ PO intake. Observed breakfast tray at bedside this morning with 100% of eggs, 1x pancake and 50% cranberry juice consumed. Pt saving blueberry muffin for in between meals as snack- reports self monitoring CHO intake.   Nutrient Intake: Patient meeting >/=75% of estimated energy needs in-house     Diet order: Diet, Regular:   Consistent Carbohydrate {Evening Snack}  DASH/TLC {Sodium & Cholesterol Restricted} (06-05-23 @ 09:12) [Active]    Anthropometrics:  Height (cm): 172.7 (05 Jun 2023 15:02)  Weight (kg): 72.6 (04 Jun 2023 11:36)  BMI (kg/m2): 24.3 (05 Jun 2023 15:02)  IBW: 63.6 kg (140 lbs)   Daily: no new weights to assess   % Weight Change: n/a     MEDICATIONS  (STANDING):  acetaminophen     Tablet .. 650 milliGRAM(s) Oral every 6 hours  aspirin 325 milliGRAM(s) Oral daily  atorvastatin 20 milliGRAM(s) Oral at bedtime  calcium carbonate 1250 mG  + Vitamin D (OsCal 500 + D) 1 Tablet(s) Oral daily  chlorhexidine 2% Cloths 1 Application(s) Topical <User Schedule>  enoxaparin Injectable 40 milliGRAM(s) SubCutaneous every 24 hours  gabapentin 300 milliGRAM(s) Oral <User Schedule>  hydrochlorothiazide 12.5 milliGRAM(s) Oral daily  insulin lispro (ADMELOG) corrective regimen sliding scale   SubCutaneous Before meals and at bedtime  methocarbamol 750 milliGRAM(s) Oral three times a day  metoprolol tartrate 25 milliGRAM(s) Oral every 12 hours  multivitamin 1 Tablet(s) Oral daily  regadenoson Injectable 0.4 milliGRAM(s) IV Push once    MEDICATIONS  (PRN):  ketorolac   Injectable 15 milliGRAM(s) IV Push every 8 hours PRN Severe Pain (7 - 10)  melatonin 3 milliGRAM(s) Oral at bedtime PRN Insomnia    Pertinent Labs: 06-11 @ 21:39: Na 134<L>, BUN 30<H>, Cr 0.8, <H>, K+ 4.6, Phos 3.5, Mg 1.7<L>, Alk Phos --, ALT/SGPT --, AST/SGOT --, HbA1c --    Finger Sticks:  POCT Blood Glucose.: 230 mg/dL (06-12 @ 11:32)  POCT Blood Glucose.: 113 mg/dL (06-12 @ 07:23)  POCT Blood Glucose.: 133 mg/dL (06-11 @ 21:26)  POCT Blood Glucose.: 98 mg/dL (06-11 @ 17:15)      Physical Findings (per flowsheets):  - Cognition: AAOx4   - GI function: denies nausea/ vomiting; last documented BM 6/12   - Tubes: no nutritionally pertinent lines, drains, tubes noted   - Oral/Mouth cavity: no chewing or swallowing issues noted/ reported   - Skin:   - Edema:      Nutrition Requirements: with consideration for age, weight, BMI  Weight Used:     Estimated Energy Needs    Continue [x]  Adjust []  Estimated Protein Needs    Continue [x]  Adjust []  Estimated Fluid Needs        Continue [x]  Adjust []    [x] Previous Nutrition Diagnosis:            [x] Ongoing          [] Resolved  #1  Goal/Expected Outcome:   Nutrition Intervention: Meals and Snacks, Medical Food Supplement, Vitamin Supplement, Nutrition Related Medication, Coordination of Care  Indicator/Monitoring:  Monitor diet order, energy intake, food and nutrient intake, body composition, weight    Recommendations:        Yancy Brewer, #[id number] or via TEAMS  Patient is at *** Risk, follow up x *** days Registered Dietitian Follow-Up    Patient Profile Reviewed                           Yes [x]   No []  Nutrition History Previously Obtained        Yes [x]  No []      Pertinent Medical Information: Pt presented s/p fall and fracture of left femur, now s/p Open reduction and internal fixation of left hip using intramedullary hip screw 6/4.    Nutrition Interval History: Pt reports good appetite/ PO intake. Observed breakfast tray at bedside this morning with 100% of eggs, 1x pancake and 50% cranberry juice consumed. Pt saving blueberry muffin for in between meals as snack- reports self monitoring CHO intake.   Nutrient Intake: Patient meeting >/=75% of estimated energy needs in-house     Diet order: Diet, Regular:   Consistent Carbohydrate {Evening Snack}  DASH/TLC {Sodium & Cholesterol Restricted} (06-05-23 @ 09:12) [Active]    Anthropometrics:  Height (cm): 172.7 (05 Jun 2023 15:02)  Weight (kg): 72.6 (04 Jun 2023 11:36)  BMI (kg/m2): 24.3 (05 Jun 2023 15:02)  IBW: 63.6 kg (140 lbs)   Daily: no new weights to assess   % Weight Change: n/a     MEDICATIONS  (STANDING):  acetaminophen     Tablet .. 650 milliGRAM(s) Oral every 6 hours  aspirin 325 milliGRAM(s) Oral daily  atorvastatin 20 milliGRAM(s) Oral at bedtime  calcium carbonate 1250 mG  + Vitamin D (OsCal 500 + D) 1 Tablet(s) Oral daily  chlorhexidine 2% Cloths 1 Application(s) Topical <User Schedule>  enoxaparin Injectable 40 milliGRAM(s) SubCutaneous every 24 hours  gabapentin 300 milliGRAM(s) Oral <User Schedule>  hydrochlorothiazide 12.5 milliGRAM(s) Oral daily  insulin lispro (ADMELOG) corrective regimen sliding scale   SubCutaneous Before meals and at bedtime  methocarbamol 750 milliGRAM(s) Oral three times a day  metoprolol tartrate 25 milliGRAM(s) Oral every 12 hours  multivitamin 1 Tablet(s) Oral daily  regadenoson Injectable 0.4 milliGRAM(s) IV Push once    MEDICATIONS  (PRN):  ketorolac   Injectable 15 milliGRAM(s) IV Push every 8 hours PRN Severe Pain (7 - 10)  melatonin 3 milliGRAM(s) Oral at bedtime PRN Insomnia    Pertinent Labs: 06-11 @ 21:39: Na 134<L>, BUN 30<H>, Cr 0.8, <H>, K+ 4.6, Phos 3.5, Mg 1.7<L>, Alk Phos --, ALT/SGPT --, AST/SGOT --, HbA1c --    Finger Sticks:  POCT Blood Glucose.: 230 mg/dL (06-12 @ 11:32)  POCT Blood Glucose.: 113 mg/dL (06-12 @ 07:23)  POCT Blood Glucose.: 133 mg/dL (06-11 @ 21:26)  POCT Blood Glucose.: 98 mg/dL (06-11 @ 17:15)      Physical Findings (per flowsheets):  - Cognition: AAOx4   - GI function: denies nausea/ vomiting; last documented BM 6/12   - Tubes: no nutritionally pertinent lines, drains, tubes noted   - Oral/Mouth cavity: no chewing or swallowing issues noted/ reported   - Skin: L hip surgical incision   - Edema: L leg +2 edema (last documented 6/7)      Nutrition Requirements:   Weight Used: 72.6 kg with consideration for age, weight, BMI, clinical status (s/p ORIF)      Estimated Energy Needs    Continue [x]  Adjust []  9784-1982 kcal/day (MSJ 1255*SF 1.2-1.3)     Estimated Protein Needs    Continue [x]  Adjust []  87-94 gm/day (1.2-1.3 gm/kg)     Estimated Fluid Needs        Continue [x]  Adjust []  1815 mL/day (25 mL/kg)     [x] Previous Nutrition Diagnosis: Inadequate energy intake             [] Ongoing          [x] Resolved    Goal/Expected Outcome: Pt to meet >/=75% of estimated nutrient needs within 7-10 days     Nutrition Intervention:   1. Diet Modification: continue Consistent Carbohydrate (evening snack), DASH/TLC  2. Nutrition-Related Eduction: encouraged ongoing good PO intake with emphasis on protein intake to promote healing s/p ORIF     Indicator/Monitoring: Diet order, PO intake, swallowing function, GI function, biochemical data, weight trends, NFPF, skin integrity     Pt is at low nutrition risk, RD to f/u in 7-10 days   RD to remain available: Darien Klein spectra x [id number] or TEAMS

## 2023-06-12 NOTE — PROGRESS NOTE ADULT - SUBJECTIVE AND OBJECTIVE BOX
GENERAL SURGERY PROGRESS NOTE    Patient: RAFAEL NO , 78y ([birth year])Female   MRN: [id number]  Location: [address] (Back) 018 A  Visit: 06-03-23 Inpatient  Date: 06-12-23 @ 08:15      Procedure/Dx/Injuries:   left femur fx s/p left ORIF    Events of past 24 hours: no acute events, pain well controlled, tolerating diet    PAST MEDICAL & SURGICAL HISTORY:  Hypertension      HLD (hyperlipidemia)      CAD (coronary artery disease)      OA (osteoarthritis)      Compression fracture of spine  2015 lumbar      Aortic stenosis      Compression fracture of spine      Type 2 diabetes mellitus  diet controlled      OAB (overactive bladder)      Transient ischemic attack (TIA)  Jan 2014- with residual weakness on right leg      Falls      Neuropathy      History of coronary artery stent placement  KILEY to mLAD (Jan 2021)      History of cataract surgery      H/O oral surgery      History of complete heart block  MDT dual chamber 3/11/2021          Vitals:   T(F): 98.2 (06-12-23 @ 07:48), Max: 98.9 (06-11-23 @ 21:22)  HR: 68 (06-12-23 @ 07:48)  BP: 147/67 (06-12-23 @ 07:48)  RR: 18 (06-12-23 @ 07:48)  SpO2: 96% (06-12-23 @ 07:48)      Diet, Regular:   Consistent Carbohydrate Evening Snack  DASH/TLC Sodium & Cholesterol Restricted      Fluids:     I & O's:    06-11-23 @ 07:01  -  06-12-23 @ 07:00  --------------------------------------------------------  IN:  Total IN: 0 mL    OUT:    Voided (mL): 2400 mL  Total OUT: 2400 mL    Total NET: -2400 mL          PHYSICAL EXAM:  PHYSICAL EXAM:  GENERAL: No acute distress, well-developed  CHEST/LUNG: CTAB; No wheezes, rales, or rhonchi  HEART: Regular rate and rhythm.   ABDOMEN: Soft, non-tender, non-distended  EXTREMITIES:  2+ peripheral pulses b/l, No clubbing, cyanosis, or edema, L hip wound dressing clean   NEUROLOGY: A&O x 3, no focal deficits  SKIN: No rashes or lesions    MEDICATIONS  (STANDING):  acetaminophen     Tablet .. 650 milliGRAM(s) Oral every 6 hours  aspirin 325 milliGRAM(s) Oral daily  atorvastatin 20 milliGRAM(s) Oral at bedtime  calcium carbonate 1250 mG  + Vitamin D (OsCal 500 + D) 1 Tablet(s) Oral daily  chlorhexidine 2% Cloths 1 Application(s) Topical <User Schedule>  enoxaparin Injectable 40 milliGRAM(s) SubCutaneous every 24 hours  gabapentin 300 milliGRAM(s) Oral <User Schedule>  hydrochlorothiazide 12.5 milliGRAM(s) Oral daily  insulin lispro (ADMELOG) corrective regimen sliding scale   SubCutaneous Before meals and at bedtime  methocarbamol 750 milliGRAM(s) Oral three times a day  metoprolol tartrate 25 milliGRAM(s) Oral every 12 hours  multivitamin 1 Tablet(s) Oral daily    MEDICATIONS  (PRN):  ketorolac   Injectable 15 milliGRAM(s) IV Push every 8 hours PRN Severe Pain (7 - 10)  melatonin 3 milliGRAM(s) Oral at bedtime PRN Insomnia      DVT PROPHYLAXIS: enoxaparin Injectable 40 milliGRAM(s) SubCutaneous every 24 hours    GI PROPHYLAXIS:   ANTICOAGULATION:   ANTIBIOTICS:            LAB/STUDIES:  Labs:  CAPILLARY BLOOD GLUCOSE      POCT Blood Glucose.: 113 mg/dL (12 Jun 2023 07:23)  POCT Blood Glucose.: 133 mg/dL (11 Jun 2023 21:26)  POCT Blood Glucose.: 98 mg/dL (11 Jun 2023 17:15)  POCT Blood Glucose.: 190 mg/dL (11 Jun 2023 11:15)                          8.4    6.97  )-----------( 312      ( 11 Jun 2023 21:39 )             25.5       Auto Neutrophil %: 61.3 % (06-11-23 @ 21:39)  Auto Immature Granulocyte %: 1.3 % (06-11-23 @ 21:39)    06-11    134<L>  |  100  |  30<H>  ----------------------------<  116<H>  4.6   |  23  |  0.8      Calcium, Total Serum: 8.4 mg/dL (06-11-23 @ 21:39)      LFTs:             4.7  | 0.9  | 44       ------------------[74      ( 10 Ed 2023 20:40 )  3.0  | x    | 27          Lipase:x      Amylase:x             Coags:     11.00  ----< 0.97    ( 11 Jun 2023 21:39 )     x                               IMAGING:      ACCESS/ DEVICES:  [ x] Peripheral IV  [ ] Central Venous Line	[ ] R	[ ] L	[ ] IJ	[ ] Fem	[ ] SC	Placed:   [ ] Arterial Line		[ ] R	[ ] L	[ ] Fem	[ ] Rad	[ ] Ax	Placed:   [ ] PICC:					[ ] Mediport  [ ] Urinary Catheter,  Date Placed:   [ ] Chest tube: [ ] Right, [ ] Left  [ ] GRANT/Ghassan Drains

## 2023-06-12 NOTE — PROGRESS NOTE ADULT - ASSESSMENT
78F HTN/HLD, CAD, AS s/p TAVR, admitted 6/3/23 mechanical fall found to have left femur fracture now s/p ORIF 6/4/23 c/b post-op hypotension requiring SICU admission, now DG to floor.     PLAN:  #L femur fracture s/p ORIF w/intramedullar hip screw  -Regular diet  -Hgb >8 due to cardiac hx  -Left Lower Extremity:  Weight Bearing As Tolerated   -EP recommended ischemic work up while inpatinet. Patient's cardiologist agreed with EP.   -Activity - Ambulate as Tolerated   -Ortho recs: d/c patient on 6 weeks of  daily if no contraindications   -PT recs: acute inpatient rehab  -OT recs: rehab   -CM x dispo    spectra [id number]

## 2023-06-13 LAB
GLUCOSE BLDC GLUCOMTR-MCNC: 112 MG/DL — HIGH (ref 70–99)
GLUCOSE BLDC GLUCOMTR-MCNC: 114 MG/DL — HIGH (ref 70–99)
GLUCOSE BLDC GLUCOMTR-MCNC: 140 MG/DL — HIGH (ref 70–99)
GLUCOSE BLDC GLUCOMTR-MCNC: 91 MG/DL — SIGNIFICANT CHANGE UP (ref 70–99)
SARS-COV-2 RNA SPEC QL NAA+PROBE: SIGNIFICANT CHANGE UP

## 2023-06-13 PROCEDURE — 99232 SBSQ HOSP IP/OBS MODERATE 35: CPT

## 2023-06-13 PROCEDURE — 99233 SBSQ HOSP IP/OBS HIGH 50: CPT

## 2023-06-13 RX ORDER — ASPIRIN/CALCIUM CARB/MAGNESIUM 324 MG
81 TABLET ORAL DAILY
Refills: 0 | Status: DISCONTINUED | OUTPATIENT
Start: 2023-06-13 | End: 2023-06-14

## 2023-06-13 RX ADMIN — Medication 15 MILLIGRAM(S): at 09:17

## 2023-06-13 RX ADMIN — GABAPENTIN 300 MILLIGRAM(S): 400 CAPSULE ORAL at 09:16

## 2023-06-13 RX ADMIN — Medication 1 TABLET(S): at 11:17

## 2023-06-13 RX ADMIN — CHLORHEXIDINE GLUCONATE 1 APPLICATION(S): 213 SOLUTION TOPICAL at 05:26

## 2023-06-13 RX ADMIN — GABAPENTIN 300 MILLIGRAM(S): 400 CAPSULE ORAL at 11:18

## 2023-06-13 RX ADMIN — Medication 650 MILLIGRAM(S): at 00:07

## 2023-06-13 RX ADMIN — METHOCARBAMOL 750 MILLIGRAM(S): 500 TABLET, FILM COATED ORAL at 05:25

## 2023-06-13 RX ADMIN — Medication 1 TABLET(S): at 11:18

## 2023-06-13 RX ADMIN — Medication 650 MILLIGRAM(S): at 23:44

## 2023-06-13 RX ADMIN — GABAPENTIN 300 MILLIGRAM(S): 400 CAPSULE ORAL at 20:17

## 2023-06-13 RX ADMIN — Medication 25 MILLIGRAM(S): at 18:48

## 2023-06-13 RX ADMIN — GABAPENTIN 300 MILLIGRAM(S): 400 CAPSULE ORAL at 15:38

## 2023-06-13 RX ADMIN — METHOCARBAMOL 750 MILLIGRAM(S): 500 TABLET, FILM COATED ORAL at 12:52

## 2023-06-13 RX ADMIN — ATORVASTATIN CALCIUM 20 MILLIGRAM(S): 80 TABLET, FILM COATED ORAL at 22:00

## 2023-06-13 RX ADMIN — Medication 325 MILLIGRAM(S): at 11:18

## 2023-06-13 RX ADMIN — ENOXAPARIN SODIUM 40 MILLIGRAM(S): 100 INJECTION SUBCUTANEOUS at 11:18

## 2023-06-13 RX ADMIN — Medication 650 MILLIGRAM(S): at 05:24

## 2023-06-13 RX ADMIN — Medication 650 MILLIGRAM(S): at 23:14

## 2023-06-13 RX ADMIN — Medication 25 MILLIGRAM(S): at 05:24

## 2023-06-13 RX ADMIN — METHOCARBAMOL 750 MILLIGRAM(S): 500 TABLET, FILM COATED ORAL at 22:00

## 2023-06-13 RX ADMIN — Medication 650 MILLIGRAM(S): at 18:48

## 2023-06-13 NOTE — PROGRESS NOTE ADULT - SUBJECTIVE AND OBJECTIVE BOX
SURGERY PROGRESS NOTE     Patient: RAFAEL NO , 78y ([birth year])Female   MRN: [id number]  Location: [address] (Back) 018 A  Visit: 06-03-23 Inpatient  Date: 06-13-23 @ 01:47       Events of past 24 hours:  Patient seen resting comfortably in bed. No acute events overnight. Hemodynamically stable    PAST MEDICAL & SURGICAL HISTORY:  Hypertension      HLD (hyperlipidemia)      CAD (coronary artery disease)      OA (osteoarthritis)      Compression fracture of spine  2015 lumbar      Aortic stenosis      Compression fracture of spine      Type 2 diabetes mellitus  diet controlled      OAB (overactive bladder)      Transient ischemic attack (TIA)  Jan 2014- with residual weakness on right leg      Falls      Neuropathy      History of coronary artery stent placement  KILEY to mLAD (Jan 2021)      History of cataract surgery      H/O oral surgery      History of complete heart block  MDT dual chamber 3/11/2021           Vitals:   T(F): 97.6 (06-13-23 @ 00:17), Max: 98.6 (06-12-23 @ 16:52)  HR: 78 (06-13-23 @ 00:17)  BP: 132/60 (06-13-23 @ 00:17)  RR: 18 (06-13-23 @ 00:17)  SpO2: 98% (06-13-23 @ 00:17)      Diet, Regular:   Consistent Carbohydrate Evening Snack  DASH/TLC Sodium & Cholesterol Restricted      Fluids:     I & O's:    06-11-23 @ 07:01  -  06-12-23 @ 07:00  --------------------------------------------------------  IN:  Total IN: 0 mL    OUT:    Voided (mL): 2400 mL  Total OUT: 2400 mL    Total NET: -2400 mL           PHYSICAL EXAM:   General: NAD, AAOx3, calm and cooperative  Cardiac: RRR S1, S2  Respiratory: CTAB, normal respiratory effort  Abdomen: Soft, non-distended, non-tender, no rebound, no guarding. +BS.  Vascular: Pulses 2+ throughout, extremities well perfused  Skin: Warm/dry, normal color, no jaundice    MEDICATIONS  (STANDING):  acetaminophen     Tablet .. 650 milliGRAM(s) Oral every 6 hours  aspirin 325 milliGRAM(s) Oral daily  atorvastatin 20 milliGRAM(s) Oral at bedtime  calcium carbonate 1250 mG  + Vitamin D (OsCal 500 + D) 1 Tablet(s) Oral daily  chlorhexidine 2% Cloths 1 Application(s) Topical <User Schedule>  enoxaparin Injectable 40 milliGRAM(s) SubCutaneous every 24 hours  gabapentin 300 milliGRAM(s) Oral <User Schedule>  hydrochlorothiazide 12.5 milliGRAM(s) Oral daily  insulin lispro (ADMELOG) corrective regimen sliding scale   SubCutaneous Before meals and at bedtime  methocarbamol 750 milliGRAM(s) Oral three times a day  metoprolol tartrate 25 milliGRAM(s) Oral every 12 hours  multivitamin 1 Tablet(s) Oral daily  regadenoson Injectable 0.4 milliGRAM(s) IV Push once    MEDICATIONS  (PRN):  ketorolac   Injectable 15 milliGRAM(s) IV Push every 8 hours PRN Severe Pain (7 - 10)  melatonin 3 milliGRAM(s) Oral at bedtime PRN Insomnia      DVT PROPHYLAXIS: enoxaparin Injectable 40 milliGRAM(s) SubCutaneous every 24 hours    GI PROPHYLAXIS:   ANTICOAGULATION:   ANTIBIOTICS:            LAB/STUDIES:  Labs:  CAPILLARY BLOOD GLUCOSE      POCT Blood Glucose.: 131 mg/dL (12 Jun 2023 21:57)  POCT Blood Glucose.: 104 mg/dL (12 Jun 2023 18:01)  POCT Blood Glucose.: 230 mg/dL (12 Jun 2023 11:32)  POCT Blood Glucose.: 113 mg/dL (12 Jun 2023 07:23)                          8.8    8.25  )-----------( 353      ( 12 Jun 2023 21:57 )             26.9       Auto Neutrophil %: 65.5 % (06-12-23 @ 21:57)  Auto Immature Granulocyte %: 1.2 % (06-12-23 @ 21:57)    06-12    132<L>  |  98  |  29<H>  ----------------------------<  120<H>  4.0   |  25  |  0.8      Calcium, Total Serum: 8.4 mg/dL (06-12-23 @ 21:57)      LFTs:         Coags:     11.00  ----< 0.97    ( 11 Jun 2023 21:39 )     x

## 2023-06-13 NOTE — CONSULT NOTE ADULT - SUBJECTIVE AND OBJECTIVE BOX
Patient is a 78y old  Female who presents with a chief complaint of Left femur fracture (07 Jun 2023 06:25)      SUBJECTIVE / OVERNIGHT EVENTS: Pt is npo pending a cardiac nuclear stress test today. she had questions about the procedure as well as when she can resume eating. No other complaints. Answered her questions.   ADDITIONAL REVIEW OF SYSTEMS: as above    MEDICATIONS  (STANDING):  acetaminophen     Tablet .. 650 milliGRAM(s) Oral every 6 hours  aspirin  chewable 81 milliGRAM(s) Oral daily  atorvastatin 20 milliGRAM(s) Oral at bedtime  calcium carbonate 1250 mG  + Vitamin D (OsCal 500 + D) 1 Tablet(s) Oral daily  chlorhexidine 2% Cloths 1 Application(s) Topical <User Schedule>  enoxaparin Injectable 40 milliGRAM(s) SubCutaneous every 24 hours  gabapentin 300 milliGRAM(s) Oral every 8 hours  hydrochlorothiazide 12.5 milliGRAM(s) Oral daily  insulin lispro (ADMELOG) corrective regimen sliding scale subCutaneous Before meals and at bedtime  metoprolol tartrate 25 milliGRAM(s) Oral every 12 hours  multivitamin 1 Tablet(s) Oral daily    MEDICATIONS  (PRN):  ketorolac   Injectable 15 milliGRAM(s) IV Push every 6 hours PRN Severe Pain (7 - 10)      CAPILLARY BLOOD GLUCOSE      POCT Blood Glucose.: 120 mg/dL (08 Jun 2023 07:17)  POCT Blood Glucose.: 135 mg/dL (07 Jun 2023 21:33)  POCT Blood Glucose.: 125 mg/dL (07 Jun 2023 17:17)  POCT Blood Glucose.: 172 mg/dL (07 Jun 2023 11:33)    I&O's Summary    07 Jun 2023 07:01  -  08 Jun 2023 07:00  --------------------------------------------------------  IN: 0 mL / OUT: 780 mL / NET: -780 mL        PHYSICAL EXAM:  Vital Signs Last 24 Hrs  T(C): 36.6 (08 Jun 2023 08:38), Max: 36.6 (07 Jun 2023 12:00)  T(F): 97.9 (08 Jun 2023 08:38), Max: 97.9 (08 Jun 2023 08:38)  HR: 68 (08 Jun 2023 08:38) (68 - 88)  BP: 127/58 (08 Jun 2023 08:38) (103/64 - 159/72)  BP(mean): 81 (07 Jun 2023 20:00) (70 - 109)  RR: 18 (08 Jun 2023 08:38) (12 - 24)  SpO2: 98% (08 Jun 2023 08:38) (98% - 100%)    Parameters below as of 07 Jun 2023 20:00  Patient On (Oxygen Delivery Method): room air      CONSTITUTIONAL: NAD  ENMT: Moist oral mucosa, no pharyngeal injection or exudates; normal dentition  NECK: Supple, no palpable masses; no thyromegaly  RESPIRATORY: Normal respiratory effort; lungs are clear to auscultation bilaterally  CARDIOVASCULAR: Regular rate and rhythm, normal S1 and S2, no murmur/rub/gallop; No lower extremity edema; Peripheral pulses are 2+ bilaterally  ABDOMEN: Nontender to palpation, normoactive bowel sounds, no rebound/guarding; No hepatosplenomegaly  MUSCULOSKELETAL:  LLE s/p ORIF; no clubbing or cyanosis of digits; no joint swelling or tenderness to palpation  PSYCH: A+O to person, place, and time; affect appropriate  SKIN: No rashes; no palpable lesions    LABS:                        7.7    7.59  )-----------( 164      ( 07 Jun 2023 20:45 )             23.2     06-07    133<L>  |  102  |  27<H>  ----------------------------<  120<H>  4.0   |  22  |  1.0    Ca    7.7<L>      07 Jun 2023 20:45  Phos  2.8     06-07  Mg     2.0     06-07                  RADIOLOGY & ADDITIONAL TESTS:  Results Reviewed:   Imaging Personally Reviewed:  Electrocardiogram Personally Reviewed:    COORDINATION OF CARE:  Care Discussed with Consultants/Other Providers [Y/N]:  Prior or Outpatient Records Reviewed [Y/N]:

## 2023-06-13 NOTE — CONSULT NOTE ADULT - ASSESSMENT
78F HTN/HLD, CAD, AS s/p TAVR, admitted 6/3/23 mechanical fall found to have left femur fracture now s/p ORIF 6/4/23 c/b post-op hypotension requiring SICU, stabilized, and then downgraded to the floor.    #L femur fracture s/p ORIF w/intramedullary hip screw  #postop hypotension requiring the use of vasopressors  - no longer on pressors  - hold home antihypertensives (amlodipine, lisinopril, chlorthalidone)  - keep Hb >8 g/dL given cardiac history   - pt is already s/p 4 units prbc  - titrate down pain medications and monitor for lethargy  - continue incentive spirometry  - continue on aspirin 325mg qd for 30 days postop as per ortho  - ISS  - continue statin  - dispo planning as per trauma    #CAD s/p stents  - Echo (6/4): EF 69%; G1DD, Significant concentric LVH with LVOT Obstruction   - Post-op EKG (6/4): Sinus rhythm with 1st degree AVB   - PPM interrogation noted brief NSVT  - pending an ischemic workup with a nuclear stress test today prior to discharge for NSVT  - obtain Mg and phos as well and keep electrolytes corrected to K >4, Phos >3, Mg >2

## 2023-06-13 NOTE — PROGRESS NOTE ADULT - SUBJECTIVE AND OBJECTIVE BOX
s/p left hip orif pod 9   pt seen at bedside   has been having drainage and blisters   dressing removed   blisters appear to have popped and there is drainage on old dressing   staples cleaned with etoh swab   betadine over staples and blister   xeroform placed over staples and blister tear   dressing applied     discussed with trauma team lovenox and asa 325mg daily.   they will confer with cardiology need for asa 325.      i recommend for dvt proph lovenox 40mg once daily for 1 month as pt has been hoya lift from bed and is not ambulating   if dcd please include   wound care instructions   betadine dressing xeroform and dressing   staple removal 3 weeks post op   will follow while inpatient

## 2023-06-13 NOTE — PROGRESS NOTE ADULT - ASSESSMENT
78F HTN/HLD, CAD, AS s/p TAVR, admitted 6/3/23 mechanical fall found to have left femur fracture now s/p ORIF 6/4/23 c/b post-op hypotension requiring SICU admission, now DG to floor.     PLAN:  #L femur fracture s/p ORIF w/intramedullar hip screw  -Regular diet  -Hgb >8 due to cardiac hx  -EP recommended ischemic work up while inpatinet. Patient's cardiologist agreed with EP.   -Cardiac stress test ordered  -Ortho recs: d/c patient on 6 weeks of  daily if no contraindications   -Pending auth for [employer]

## 2023-06-14 ENCOUNTER — TRANSCRIPTION ENCOUNTER (OUTPATIENT)
Age: 78
End: 2023-06-14

## 2023-06-14 VITALS
DIASTOLIC BLOOD PRESSURE: 67 MMHG | TEMPERATURE: 97 F | RESPIRATION RATE: 18 BRPM | SYSTOLIC BLOOD PRESSURE: 146 MMHG | HEART RATE: 68 BPM | OXYGEN SATURATION: 97 %

## 2023-06-14 LAB
GLUCOSE BLDC GLUCOMTR-MCNC: 100 MG/DL — HIGH (ref 70–99)
GLUCOSE BLDC GLUCOMTR-MCNC: 110 MG/DL — HIGH (ref 70–99)

## 2023-06-14 PROCEDURE — 99233 SBSQ HOSP IP/OBS HIGH 50: CPT

## 2023-06-14 PROCEDURE — 99238 HOSP IP/OBS DSCHRG MGMT 30/<: CPT

## 2023-06-14 PROCEDURE — 93016 CV STRESS TEST SUPVJ ONLY: CPT

## 2023-06-14 PROCEDURE — 93018 CV STRESS TEST I&R ONLY: CPT

## 2023-06-14 PROCEDURE — 78452 HT MUSCLE IMAGE SPECT MULT: CPT | Mod: 26

## 2023-06-14 RX ORDER — OMEGA-3 ACID ETHYL ESTERS 1 G
1 CAPSULE ORAL
Qty: 0 | Refills: 0 | DISCHARGE

## 2023-06-14 RX ORDER — ACETAMINOPHEN 500 MG
1 TABLET ORAL
Qty: 0 | Refills: 0 | DISCHARGE

## 2023-06-14 RX ORDER — KETOROLAC TROMETHAMINE 30 MG/ML
15 SYRINGE (ML) INJECTION ONCE
Refills: 0 | Status: DISCONTINUED | OUTPATIENT
Start: 2023-06-14 | End: 2023-06-14

## 2023-06-14 RX ORDER — OXYCODONE HYDROCHLORIDE 5 MG/1
5 TABLET ORAL ONCE
Refills: 0 | Status: DISCONTINUED | OUTPATIENT
Start: 2023-06-14 | End: 2023-06-14

## 2023-06-14 RX ORDER — METHOCARBAMOL 500 MG/1
1 TABLET, FILM COATED ORAL
Qty: 0 | Refills: 0 | DISCHARGE
Start: 2023-06-14

## 2023-06-14 RX ORDER — LISINOPRIL 2.5 MG/1
1 TABLET ORAL
Refills: 0 | DISCHARGE

## 2023-06-14 RX ORDER — GLUCOSAMINE HCL/CHONDROITIN SU 500-400 MG
0 CAPSULE ORAL
Qty: 0 | Refills: 0 | DISCHARGE

## 2023-06-14 RX ORDER — ENOXAPARIN SODIUM 100 MG/ML
40 INJECTION SUBCUTANEOUS
Qty: 0 | Refills: 0 | DISCHARGE
Start: 2023-06-14 | End: 2023-07-12

## 2023-06-14 RX ORDER — MORPHINE SULFATE 50 MG/1
2 CAPSULE, EXTENDED RELEASE ORAL ONCE
Refills: 0 | Status: DISCONTINUED | OUTPATIENT
Start: 2023-06-14 | End: 2023-06-14

## 2023-06-14 RX ADMIN — ENOXAPARIN SODIUM 40 MILLIGRAM(S): 100 INJECTION SUBCUTANEOUS at 08:28

## 2023-06-14 RX ADMIN — MORPHINE SULFATE 2 MILLIGRAM(S): 50 CAPSULE, EXTENDED RELEASE ORAL at 19:32

## 2023-06-14 RX ADMIN — METHOCARBAMOL 750 MILLIGRAM(S): 500 TABLET, FILM COATED ORAL at 06:18

## 2023-06-14 RX ADMIN — Medication 1 TABLET(S): at 14:47

## 2023-06-14 RX ADMIN — Medication 25 MILLIGRAM(S): at 06:18

## 2023-06-14 RX ADMIN — Medication 650 MILLIGRAM(S): at 06:19

## 2023-06-14 RX ADMIN — CHLORHEXIDINE GLUCONATE 1 APPLICATION(S): 213 SOLUTION TOPICAL at 06:43

## 2023-06-14 RX ADMIN — GABAPENTIN 300 MILLIGRAM(S): 400 CAPSULE ORAL at 14:47

## 2023-06-14 RX ADMIN — GABAPENTIN 300 MILLIGRAM(S): 400 CAPSULE ORAL at 17:13

## 2023-06-14 RX ADMIN — METHOCARBAMOL 750 MILLIGRAM(S): 500 TABLET, FILM COATED ORAL at 14:45

## 2023-06-14 RX ADMIN — Medication 650 MILLIGRAM(S): at 14:48

## 2023-06-14 RX ADMIN — Medication 1 TABLET(S): at 14:44

## 2023-06-14 RX ADMIN — Medication 650 MILLIGRAM(S): at 06:49

## 2023-06-14 RX ADMIN — GABAPENTIN 300 MILLIGRAM(S): 400 CAPSULE ORAL at 08:28

## 2023-06-14 RX ADMIN — Medication 81 MILLIGRAM(S): at 14:48

## 2023-06-14 RX ADMIN — Medication 15 MILLIGRAM(S): at 04:06

## 2023-06-14 NOTE — PROGRESS NOTE ADULT - ATTENDING COMMENTS
Patient is clinically stable, afebrile.  As per Cardiology, she needs Stress test done.    ASSESSMENT:  79 y/o female, S/P Fall.  Closed Left Shaft Femur Fracture.  S/P ORIF.  Acute pain due to trauma  Acute blood loss anemia.  Atelectasis.    PLAN:  - pain control  - Cardiology has to follow for stress test order and report  - keep normotensive   - IS  - DVT prophylaxis  - PT  - SS for discharge planning
Trauma/ACS Attending  Note Attestation    Patient is examined and evaluated at the bedside with the residents/PAs. Treatment plan discussed with the team, nurses, and consulting physicians and consulting teams. Medications, radiological studies and all other relevant studies reviewed.     RAFAEL NO Patient is a 78y old  Female who presents with a chief complaint of Left femur fracture      Vital Signs Last 24 Hrs  T(C): 36.7 (12 Jun 2023 00:30), Max: 37.2 (11 Jun 2023 21:22)  T(F): 98 (12 Jun 2023 00:30), Max: 98.9 (11 Jun 2023 21:22)  HR: 76 (12 Jun 2023 00:30) (65 - 78)  BP: 152/65 (12 Jun 2023 00:30) (123/60 - 152/65)  BP(mean): --  RR: 18 (12 Jun 2023 00:30) (18 - 18)  SpO2: 95% (12 Jun 2023 00:30) (95% - 97%)    Parameters below as of 11 Jun 2023 08:27  Patient On (Oxygen Delivery Method): room air                            8.4    6.97  )-----------( 312      ( 11 Jun 2023 21:39 )             25.5     06-11    134<L>  |  100  |  30<H>  ----------------------------<  116<H>  4.6   |  23  |  0.8    Ca    8.4      11 Jun 2023 21:39  Phos  3.5     06-11  Mg     1.7     06-11    TPro  4.7<L>  /  Alb  3.0<L>  /  TBili  0.9  /  DBili  x   /  AST  44<H>  /  ALT  27  /  AlkPhos  74  06-10    Diagnosis: Femur fracture                  S/P ORIF    Plan:	  - supportive care  - pain management  - incentive spirometer   - DVT prophylaxis       [x ] SCDs [ x] Lovenox SQ [ ] Heparins SQ  [ ] None  - GI prophylaxis  - follow up consults  - repeat studies as needed  - PT evaluation and follow up  - replace electrolytes  - case management evaluation     Nohelia Lea MD, FACS  Trauma/ACS/Surgical Critical Care Attending
feels ok. diet, pain control. check with cardiology about ischemic workup in-house.
Pain is controlled.  Awaiting OR with Ortho today.    ASSESSMENT:  79 y/o female, S/P Fall.  Closed Left Proximal Shaft Femur Fracture.  Acute pain due to trauma.  CAD.  S/P TAVR    PLAN:  - 2D Echo  - Cardiology f/u  - pain control  - encourage incentive spirometer  - DVT prophylaxis  - Ortho for OR today for ORIF
Patient looks comfortable today.  Awaiting Stress test.  Has some hematoma in left hip area but soft.  Hb stable 8.8    ASSESSMENT:  79 y/o female, S/P Fall.  Closed Left Shaft Femur Fracture.  S/P ORIF.  Acute pain due to trauma  Acute blood loss anemia.  Atelectasis.    PLAN:  - pain control  - incentive spirometer  - DVT prophylaxis  - Stress test today  - needs Cardiology f/u
ding well. PT, pain control. restart plavix today if ok with orthopedics.
doing well. diet, pain control. ischemic workup- will check with cardiology.
Critical Care: [id number]   This patient has a high probability of sudden, clinically significant deterioration, which requires the highest level of physician preparedness to intervene urgently. I managed/supervised life or organ supporting interventions that required frequent physician assessment. I devoted my full attention in the ICU to the direct care of this patient for the period of time indicated below. Time I spent with the family or surrogate(s) is included only if the patient was incapable of providing the necessary information or participating in medical decision making. Time devoted to teaching and to any procedures I billed separately is not included.     RAFAEL NO 78y Female admitted to [x ] SICU /[ ] SDU with post-OP hypotension requiring pressors, symptomatic blood loss anemia. High risk for hemodynamic instability on pressors -> extensive cardiac history with high gradient, airway at risk for obstruction, electrolytes abnormalities, S/P ORIF left femur, required total of 3 units PRBCs   Patient is examined and evaluated at the bedside with SICU team. Treatment plan discussed with SICU team, nurses and primary team.   Chest X-ray and all relevant studies reviewed during rounds.  Will continue hemodynamic monitoring as per protocol in SICU.    Neuro:  GCS [15 ] AAOx3  [x ]  Neurovascular checks as per SICU protocol                 [ ] 3% NaCl                     Pharmacological Paralysis [ ] Yes  [x ]  No      Sedated/Pain control with                 [ ] Dilaudid drip, [ ]  Ketamine drip, [ ] Fentanyl drip, [ ] Propofol, [ ] Precedex, [ ] Versed drip, [ ] Ativan drip,                           [ ] OxyContin standing,  [ ] OxyContin PRN, [ ] Dilaudid PRN pushes, [ ] Fentanyl PRN pushes, [ ] PCA,                [ x] Tylenol IV/PO, [x ] Gabapentin, [ x]  Ketorolac, [ ] Tramadol,  [ ] Lidoderm Patch       Other Medications               [ ] Seroquel, [ ] Zyprexa, [ ] Haldol,  [ ] Clonazepam [ ] Xanax, [ ] Versed/Ativan PRN, [ ] Valium [ ] None               [ ] Robaxin   [ ] Baclofen  [ ] Flexeril               [ ] Keppra  [ ] Lamictal  [ ] Depakote  [ ] Dilantin               [ ] CIWA (Ativan/Valium/ Librium)  CV: continue to monitor, ECHO EF 69%, history of TAVR, continue home Lopressor  On pressors [ ]  Yes  [x ]  No          [ ]  Levophed, [ ] Jakob-Synephrine, [ ] Vasopressin, [ ]  Epinephrine          [ ] Dobutamine, [ ] Milrinone, [ ]  Midodrine,  [ ] Others    Other Cardiac Meds          [ ] Amiodarone IV/PO, [ ] Digoxin, [ ] Cardizem drip, [ ] Cleviprex drip, [ ] Esmolol drip, [ ] Cardene drip  Respiratory: Acute respiratory insufficiency -> continue monitoring                        None Invasive Support  [x ] Incentive Spirometer -> 1500ml                                 [ ] BiPAP   [ ] CPAP/NIV   [ ] HFNC   [ ] NR Face Mask  [ ] NC  [ ] Trach Caller [x ] Room Air                        Ventilatory support  [ ] Yes [x ] No                            [ ] SBT                                  [ ] PC    [ ] VC   [ ] AC/PRVC   [ ] BiVent/APRV   [ ]CPAP   GI  [x ]  bowel regiment  [x ] BM none [x ] Flatus +             [ ] Ostomy   [ ] NG tube        Prophylaxis [x ] PPI  [ ] H2 Blockers  [ ] Others  Nutrition: continue   [x ] Diet DASH  [ ] TPN/PPN   [ ] calories count   [ ]  Tube Feeds     Renal: Continue I&Os monitoring, Garrison catheter  [ ] Yes  [x ]  No  [ ] Consideration for discontinuation [ ] Taxes cath/PrimaFit  [ ] TOV                                                               [ ] HD/CVVH   [x ] Urine output 1000ml/24 hrs       Lytes/Acid-base: replete hypokalemia, hypomagnesemia, hypocalcemia, hypophosphatemia        IV Fluids   [x ] LR@KVO  [ ] NS  [ ] D5W1/2NS [ ] Bicarbonate Drip  [ ] Albumin [ ] Lasix drip/push  ID: leukocytosis -> continue to monitor:         IV Abx [x ] Yes edwin-OP, [ ] No;  ID consulted [ ] Yes, [x ] No        Cultures send  [ ] Respiratory   [ ] Blood   [ ] Urine  [ ] Fluids  [ ] Tissue  [x ]  None  Lines:   [ ] Right   [ ] Left  [ ] Bilateral                     [ ] Subclavian TLC        [ ]  Internal Jugular TLC     [ ]  Femoral TLC                     [ ] Subclavian Cordis    [ ] Internal Jugular Cordis   [ ] Femoral Cordis                     [ ] HD catheter    [ ] PICC     [ ]  Midline   [x ] Peripheral IVs                                                 [ x] Right   [ ] Left   [ ] None                     [x ] Radial A-Line    [ ] Femoral A-Line   [ ] Axillary A-Line               Heme: continue to evaluate for acute blood loss anemia- trend Hg/Hct                     AC Reversal Indicated [ ] Yes  [x ] No                                      [ ] Kcentra,  [ ] 3Factors concentrate, [ ] Andexxa                     Transfused  indicated  [x ] Yes, [ ] No    [x ] PRBCs [id number]   [ ] Platelets   [ ] FFPs   [ ] Cryoprecipitate                    Should be started on or continued with  following  [ ] Yes,  [x ] No                               [ ] Lovenox  [ ] Coumadin  [ ] Heparin drip  [ ] DOVACs  [ ] ASA  [ ] Antiplatelets   Endocrine: Prevent and treat hyperglycemia with insulin as needed,                                 Insuline drip for hyperglycemia [ ] Yes, [ x] No   PV: follow pulse exam  Skin: decub precautions  DVT Prophylaxis:  [x ] SCDs  [x ] Heparin SQ  [ ] Lovenox  SQ  Stress Gastritis Prophylaxis: PPI/H2 Blockers if indicated  Mobility: patient is evaluated at the bedside with mobility team and the goals for today are discussed with PT [x ] out of bed to chair    PATIENT/FAMILY/SURROGATE CONFERENCE:  [ x] Yes with patient at the bedside. [ ] No  PURPOSE: To obtain necessary information, To discuss treatment options under consideration today.    I saw and evaluated the patient personally. I have reviewed and agree with note above. Treatment plan discussed with SICU team, nurses and primary team at the time of the multidisciplinary rounds. The above note is NOT written at the time of rounds and will reflect all changes throughout management of the patient for the day note is written for.    Nohelia Lea MD, FACS  Trauma/ACS/SCC Attending
Patient had Stress test uncompleted yesterday.  Remains hemodynamically stable. Afebrile.  Left thigh swelling is better, has some ecchymosis stable    Assessment/Plan:  - completing Stress test today  - needs Cardiology f/u  - Ortho f/u  - DVT prophylaxis  - encourage IS  - SS for discharge planning
Critical Care: [id number]   This patient has a high probability of sudden, clinically significant deterioration, which requires the highest level of physician preparedness to intervene urgently. I managed/supervised life or organ supporting interventions that required frequent physician assessment. I devoted my full attention in the ICU to the direct care of this patient for the period of time indicated below. Time I spent with the family or surrogate(s) is included only if the patient was incapable of providing the necessary information or participating in medical decision making. Time devoted to teaching and to any procedures I billed separately is not included.     RAFAEL NO 78y Female admitted to [x ] SICU /[ ] SDU with post-OP hypotension requiring pressors, symptomatic blood loss anemia. High risk for hemodynamic instability on pressors -> extensive cardiac history with high gradient, airway at risk for obstruction, electrolytes abnormalities, S/P ORIF left femur, required total of 3 units PRBCs   Patient is examined and evaluated at the bedside with SICU team. Treatment plan discussed with SICU team, nurses and primary team.   Chest X-ray and all relevant studies reviewed during rounds.  Will continue hemodynamic monitoring as per protocol in SICU.    Neuro:  GCS [15 ] AAOx3  [x ]  Neurovascular checks as per SICU protocol                 [ ] 3% NaCl                     Pharmacological Paralysis [ ] Yes  [x ]  No      Sedated/Pain control with                 [ ] Dilaudid drip, [ ]  Ketamine drip, [ ] Fentanyl drip, [ ] Propofol, [ ] Precedex, [ ] Versed drip, [ ] Ativan drip,                           [ ] OxyContin standing,  [ ] OxyContin PRN, [ x] Dilaudid PRN pushes, [ ] Fentanyl PRN pushes, [ ] PCA,                [ x] Tylenol IV/PO, [x ] Gabapentin, [ ]  Ketorolac, [ ] Tramadol,  [ ] Lidoderm Patch       Other Medications               [ ] Seroquel, [ ] Zyprexa, [ ] Haldol,  [ ] Clonazepam [x ] Xanax-> will D/C, [ ] Versed/Ativan PRN, [ ] Valium [ ] None               [ ] Robaxin   [ ] Baclofen  [ ] Flexeril               [ ] Keppra  [ ] Lamictal  [ ] Depakote  [ ] Dilantin               [ ] CIWA (Ativan/Valium/ Librium)  CV: continue to monitor, ECHO EF 69%, history of TAVR, continue home Lopressor  On pressors [ ]  Yes  [x ]  No          [ ]  Levophed, [ ] Jakob-Synephrine, [ ] Vasopressin, [ ]  Epinephrine          [ ] Dobutamine, [ ] Milrinone, [ ]  Midodrine,  [ ] Others    Other Cardiac Meds          [ ] Amiodarone IV/PO, [ ] Digoxin, [ ] Cardizem drip, [ ] Cleviprex drip, [ ] Esmolol drip, [ ] Cardene drip  Respiratory: Acute respiratory insufficiency -> continue monitoring                        None Invasive Support  [x ] Incentive Spirometer -> 750ml                                 [ ] BiPAP   [ ] CPAP/NIV   [ ] HFNC   [ ] NR Face Mask  [ ] NC  [ ] Trach Caller [x ] Room Air                        Ventilatory support  [ ] Yes [x ] No                            [ ] SBT                                  [ ] PC    [ ] VC   [ ] AC/PRVC   [ ] BiVent/APRV   [ ]CPAP   GI  [x ]  bowel regiment  [x ] BM none [x ] Flatus +             [ ] Ostomy   [ ] NG tube        Prophylaxis [x ] PPI  [ ] H2 Blockers  [ ] Others  Nutrition: continue   [x ] Diet DASH  [ ] TPN/PPN   [ ] calories count   [ ]  Tube Feeds     Renal: Continue I&Os monitoring, Garrison catheter  [x] Yes  [ ]  No  [x ] Consideration for discontinuation [ ] Taxes cath/PrimaFit  [ ] TOV                                                               [ ] HD/CVVH   [x ] Urine output 1150ml/24 hrs       Lytes/Acid-base: replete hypokalemia, hypomagnesemia, hypocalcemia, hypophosphatemia        IV Fluids   [x ] LR@75ml/hr->KVO  [ ] NS  [ ] D5W1/2NS [ ] Bicarbonate Drip  [ ] Albumin [ ] Lasix drip/push  ID: leukocytosis -> continue to monitor:         IV Abx [x ] Yes edwin-OP, [ ] No;  ID consulted [ ] Yes, [x ] No        Cultures send  [ ] Respiratory   [ ] Blood   [ ] Urine  [ ] Fluids  [ ] Tissue  [x ]  None  Lines:   [ ] Right   [ ] Left  [ ] Bilateral                     [ ] Subclavian TLC        [ ]  Internal Jugular TLC     [ ]  Femoral TLC                     [ ] Subclavian Cordis    [ ] Internal Jugular Cordis   [ ] Femoral Cordis                     [ ] HD catheter    [ ] PICC     [ ]  Midline   [x ] Peripheral IVs                                                 [ x] Right   [ ] Left   [ ] None                     [x ] Radial A-Line    [ ] Femoral A-Line   [ ] Axillary A-Line               Heme: continue to evaluate for acute blood loss anemia- trend Hg/Hct                     AC Reversal Indicated [ ] Yes  [x ] No                                      [ ] Kcentra,  [ ] 3Factors concentrate, [ ] Andexxa                     Transfused  indicated  [x ] Yes, [ ] No    [x ] PRBCs [id number]   [ ] Platelets   [ ] FFPs   [ ] Cryoprecipitate                    Should be started on or continued with  following  [ ] Yes,  [x ] No                               [ ] Lovenox  [ ] Coumadin  [ ] Heparin drip  [ ] DOVACs  [ ] ASA  [ ] Antiplatelets   Endocrine: Prevent and treat hyperglycemia with insulin as needed,                                 Insuline drip for hyperglycemia [ ] Yes, [ x] No   PV: follow pulse exam  Skin: decub precautions  DVT Prophylaxis:  [x ] SCDs  [x ] Heparin SQ  [ ] Lovenox  SQ  Stress Gastritis Prophylaxis: PPI/H2 Blockers if indicated  Mobility: patient is evaluated at the bedside with mobility team and the goals for today are discussed with PT [x ] out of bed to chair    PATIENT/FAMILY/SURROGATE CONFERENCE:  [ x] Yes with patient at the bedside. [ ] No  PURPOSE: To obtain necessary information, To discuss treatment options under consideration today.    I saw and evaluated the patient personally. I have reviewed and agree with note above. Treatment plan discussed with SICU team, nurses and primary team at the time of the multidisciplinary rounds. The above note is NOT written at the time of rounds and will reflect all changes throughout management of the patient for the day note is written for.    Nohelia Lea MD, FACS  Trauma/ACS/SCC Attending
Critical Care: [id number]   This patient has a high probability of sudden, clinically significant deterioration, which requires the highest level of physician preparedness to intervene urgently. I managed/supervised life or organ supporting interventions that required frequent physician assessment. I devoted my full attention in the ICU to the direct care of this patient for the period of time indicated below. Time I spent with the family or surrogate(s) is included only if the patient was incapable of providing the necessary information or participating in medical decision making. Time devoted to teaching and to any procedures I billed separately is not included.     RAFAEL NO 78y Female admitted to [x ] SICU /[ ] SDU with post-OP hypotension requiring pressors, symptomatic blood loss anemia. High risk for hemodynamic instability on pressors -> extensive cardiac history with high gradient, airway at risk for obstruction, electrolytes abnormalities, S/P ORIF left femur, required 2 units PRBCs   Patient is examined and evaluated at the bedside with SICU team. Treatment plan discussed with SICU team, nurses and primary team.   Chest X-ray and all relevant studies reviewed during rounds.  Will continue hemodynamic monitoring as per protocol in SICU.    Neuro:  GCS [15 ] AAOx3  [x ]  Neurovascular checks as per SICU protocol                 [ ] 3% NaCl                     Pharmacological Paralysis [ ] Yes  [x ]  No      Sedated/Pain control with                 [ ] Dilaudid drip, [ ]  Ketamine drip, [ ] Fentanyl drip, [ ] Propofol, [ ] Precedex, [ ] Versed drip, [ ] Ativan drip,                           [ ] OxyContin standing,  [ ] OxyContin PRN, [ x] Dilaudid PRN pushes, [ ] Fentanyl PRN pushes, [ ] PCA,                [ x] Tylenol IV/PO, [x ] Gabapentin, [ ]  Ketorolac, [ ] Tramadol,  [ ] Lidoderm Patch       Other Medications               [ ] Seroquel, [ ] Zyprexa, [ ] Haldol,  [ ] Clonazepam [x ] Xanax-> will D/C, [ ] Versed/Ativan PRN, [ ] Valium [ ] None               [ ] Robaxin   [ ] Baclofen  [ ] Flexeril               [ ] Keppra  [ ] Lamictal  [ ] Depakote  [ ] Dilantin               [ ] CIWA (Ativan/Valium/ Librium)  CV: continue to monitor, ECHO EF 69%, history of TAVR, restart home Lopressor  On pressors [ ]  Yes  [x ]  No          [ ]  Levophed, [ ] Jakob-Synephrine, [ ] Vasopressin, [ ]  Epinephrine          [ ] Dobutamine, [ ] Milrinone, [ ]  Midodrine,  [ ] Others    Other Cardiac Meds          [ ] Amiodarone IV/PO, [ ] Digoxin, [ ] Cardizem drip, [ ] Cleviprex drip, [ ] Esmolol drip, [ ] Cardene drip    Respiratory: Acute respiratory insufficiency -> continue monitoring                        None Invasive Support  [x ] Incentive Spirometer -> 750ml                                 [ ] BiPAP   [ ] CPAP/NIV   [ ] HFNC   [ ] NR Face Mask  [ ] NC  [ ] Trach Caller [x ] Room Air                        Ventilatory support  [ ] Yes [x ] No                            [ ] SBT                                  [ ] PC    [ ] VC   [ ] AC/PRVC   [ ] BiVent/APRV   [ ]CPAP   GI  [x ]  bowel regiment  [x ] BM none [x ] Flatus +             [ ] Ostomy   [ ] NG tube        Prophylaxis [x ] PPI  [ ] H2 Blockers  [ ] Others  Nutrition: continue   [x ] Diet clears -> DASH  [ ] TPN/PPN   [ ] calories count   [ ]  Tube Feeds     Renal: Continue I&Os monitoring, Garrison catheter  [x] Yes  [ ]  No  [x ] Consideration for discontinuation [ ] Taxes cath/PrimaFit  [ ] TOV                                                               [ ] HD/CVVH   [ ] Urine output       Lytes/Acid-base: replete hypokalemia, hypomagnesemia, hypocalcemia, hypophosphatemia        IV Fluids   [x ] LR@100ml/hr  [ ] NS  [ ] D5W1/2NS [ ] Bicarbonate Drip  [ ] Albumin [ ] Lasix drip/push  ID: leukocytosis -> continue to monitor:         IV Abx [x ] Yes edwin-OP, [ ] No;  ID consulted [ ] Yes, [x ] No        Cultures send  [ ] Respiratory   [ ] Blood   [ ] Urine  [ ] Fluids  [ ] Tissue  [x ]  None  Lines:   [ ] Right   [ ] Left  [ ] Bilateral                     [ ] Subclavian TLC        [ ]  Internal Jugular TLC     [ ]  Femoral TLC                     [ ] Subclavian Cordis    [ ] Internal Jugular Cordis   [ ] Femoral Cordis                     [ ] HD catheter    [ ] PICC     [ ]  Midline   [x ] Peripheral IVs                                                 [ x] Right   [ ] Left   [ ] None                     [x ] Radial A-Line    [ ] Femoral A-Line   [ ] Axillary A-Line               Heme: continue to evaluate for acute blood loss anemia- trend Hg/Hct                     AC Reversal Indicated [ ] Yes  [x ] No                                      [ ] Kcentra,  [ ] 3Factors concentrate, [ ] Andexxa                     Transfused  indicated  [x ] Yes, [ ] No    [x ] PRBCs [id number]   [ ] Platelets   [ ] FFPs   [ ] Cryoprecipitate                    Should be started on or continued with  following  [ ] Yes,  [x ] No                               [ ] Lovenox  [ ] Coumadin  [ ] Heparin drip  [ ] DOVACs  [ ] ASA  [ ] Antiplatelets   Endocrine: Prevent and treat hyperglycemia with insulin as needed,                                 Insuline drip for hyperglycemia [ ] Yes, [ x] No   PV: follow pulse exam  Skin: decub precautions  DVT Prophylaxis:  [x ] SCDs  [x ] Heparin SQ  [ ] Lovenox  SQ  Stress Gastritis Prophylaxis: PPI/H2 Blockers if indicated  Mobility: patient is evaluated at the bedside with mobility team and the goals for today are discussed with PT [x ] out of bed to chair    PATIENT/FAMILY/SURROGATE CONFERENCE:  [ x] Yes with patient at the bedside. [ ] No  PURPOSE: To obtain necessary information, To discuss treatment options under consideration today.    I saw and evaluated the patient personally. I have reviewed and agree with note above. Treatment plan discussed with SICU team, nurses and primary team at the time of the multidisciplinary rounds. The above note is NOT written at the time of rounds and will reflect all changes throughout management of the patient for the day note is written for.    Nohelia Lea MD, FACS  Trauma/ACS/SCC Attending

## 2023-06-14 NOTE — DISCHARGE NOTE NURSING/CASE MANAGEMENT/SOCIAL WORK - NSDCVIVACCINE_GEN_ALL_CORE_FT
COVID-19 vaccine, vector-nr, [id number], PF, 0.5 mL (Prasad); 15-Mar-2021 11:23; Maryse Martins (MELLISSA); Prasad; [id number] (Exp. Date: 25-May-2021); IntraMuscular; Deltoid Left.; 0.5 milliLiter(s);

## 2023-06-14 NOTE — CONSULT NOTE ADULT - CONSULT REQUESTED BY NAME
Ortho
ED
Trauma
Dr ANISH Nelson
ED
Trauma
Trauma/Orthopedics
Dr Sparrow

## 2023-06-14 NOTE — PROVIDER CONTACT NOTE (OTHER) - ACTION/TREATMENT ORDERED:
MD at bedside and assessed patient as well. MD will contact ortho team as they did patient surgery.
Give meds with a sip of water

## 2023-06-14 NOTE — PROGRESS NOTE ADULT - ASSESSMENT
78F HTN/HLD, CAD, AS s/p TAVR, admitted 6/3/23 mechanical fall found to have left femur fracture now s/p ORIF 6/4/23 c/b post-op hypotension requiring SICU admission, now DG to floor.     PLAN:  #L femur fracture s/p ORIF w/intramedullar hip screw  -Regular diet  -Hgb >8 due to cardiac hx  [facility] with [facility].   -Cardiac stress test ordered  -Ortho recs: d/c patient on 6 weeks of  daily if no contraindications   -Likely D/C to [facility] today

## 2023-06-14 NOTE — DISCHARGE NOTE PROVIDER - NSDCMRMEDTOKEN_GEN_ALL_CORE_FT
amLODIPine 5 mg oral tablet: 1 tab(s) orally once a day  aspirin 81 mg oral delayed release tablet: 1 tab(s) orally once a day  Caltrate 600 + D oral tablet: 1 tab(s) orally once a day  chlorthalidone 25 mg oral tablet: 1 orally once a day  enoxaparin: 40 milligram(s) subcutaneous once a day  gabapentin 300 mg oral capsule: 1 cap(s) orally 3 times a day  methocarbamol 750 mg oral tablet: 1 tab(s) orally 3 times a day  metoprolol tartrate 25 mg oral tablet: 1 orally once a day  Multiple Vitamins oral tablet: 1 tab(s) orally once a day. Instructed to stop on 3/8/21.  oxyBUTYnin 5 mg oral tablet: 1 orally 3 times a day Three times per day Every day at 8:00Am, 12PM, 5PM  simvastatin 40 mg oral tablet: 1 orally once a day   amLODIPine 5 mg oral tablet: 1 tab(s) orally once a day  aspirin 81 mg oral delayed release tablet: 1 tab(s) orally once a day  Caltrate 600 + D oral tablet: 1 tab(s) orally once a day  chlorthalidone 25 mg oral tablet: 1 orally once a day  enoxaparin: 40 milligram(s) subcutaneous once a day  gabapentin 300 mg oral capsule: 1 cap(s) orally 5 times a day Patient takes at 8:00, 11:00, 14:00, 17:00, 20:00  methocarbamol 750 mg oral tablet: 1 tab(s) orally 3 times a day  metoprolol tartrate 25 mg oral tablet: 1 orally once a day  Multiple Vitamins oral tablet: 1 tab(s) orally once a day. Instructed to stop on 3/8/21.  oxyBUTYnin 5 mg oral tablet: 1 orally 3 times a day Three times per day Every day at 8:00Am, 12PM, 5PM  simvastatin 40 mg oral tablet: 1 orally once a day

## 2023-06-14 NOTE — DISCHARGE NOTE PROVIDER - NSDCFUADDINST_GEN_ALL_CORE_FT
Weight bearing as tolerated. Pain control as needed.  Follow up with your surgeon Dr. Haque after discharge. Call for an appointment.  Follow up with your cardiologist Dr. Dang after discharge, call for an appointment.  Follow up with Dr. De La Cruz in 4 weeks, call to confirm your appointment   Wound care/dressings: you have skin staples covered with guaze and Tegaderm. Dressing change as needed. Staple removal 3 weeks post-op.  Call the office with any questions.  Weight bearing as tolerated. Pain control as needed.  Continue Lovenox for 1 month for post-operative DVT prophylaxis.   Follow up with your surgeon Dr. Haque after discharge. Call for an appointment.  Follow up with your cardiologist Dr. Dang after discharge, call for an appointment.  Follow up with Dr. De La Cruz in 4 weeks, call to confirm your appointment   Wound care/dressings: you have skin staples covered with guaze and Tegaderm. Dressing change as needed. Staple removal 3 weeks post-op.  Call the office with any questions.   Return to the emergency room if you have any new symptoms.

## 2023-06-14 NOTE — DISCHARGE NOTE PROVIDER - NSDCCPTREATMENT_GEN_ALL_CORE_FT
PRINCIPAL PROCEDURE  Procedure: Open reduction and internal fixation of left hip using intramedullary hip screw  Findings and Treatment:

## 2023-06-14 NOTE — DISCHARGE NOTE PROVIDER - NSDCFUSCHEDAPPT_GEN_ALL_CORE_FT
Swati De La Cruz  [facility]  ELECTROPH [address] [place] [address]  Scheduled Appointment: 07/10/2023

## 2023-06-14 NOTE — CONSULT NOTE ADULT - PROVIDER SPECIALTY LIST ADULT
Orthopedics
Trauma Surgery
Electrophysiology
Internal Medicine
Cardiology
Internal Medicine
Physiatry
Internal Medicine
SICU
Internal Medicine

## 2023-06-14 NOTE — CONSULT NOTE ADULT - ASSESSMENT
78F HTN/HLD, CAD, AS s/p TAVR, admitted 6/3/23 mechanical fall found to have left femur fracture now s/p ORIF 6/4/23 c/b post-op hypotension requiring SICU, stabilized, and then downgraded to the floor.    #L femur fracture s/p ORIF w/intramedullary hip screw  #postop hypotension requiring the use of vasopressors  - no longer on pressors  - hold home antihypertensives (amlodipine, lisinopril, chlorthalidone)  - keep Hb >8 g/dL given cardiac history   - pt is already s/p 4 units prbc  - titrate down pain medications and monitor for lethargy  - continue incentive spirometry  - continue on aspirin 325mg qd for 30 days postop as per ortho  - ISS  - continue statin  - dispo planning as per trauma    #CAD s/p stents  - Echo (6/4): EF 69%; G1DD, Significant concentric LVH with LVOT Obstruction   - Post-op EKG (6/4): Sinus rhythm with 1st degree AVB   - PPM interrogation noted brief NSVT  - pending an ischemic workup with a nuclear stress test today prior to discharge for NSVT  - obtain Mg and phos as well and keep electrolytes corrected to K >4, Phos >3, Mg >2  - held home metoprolol and hctz prior to stress test; can resume post

## 2023-06-14 NOTE — DISCHARGE NOTE PROVIDER - HOSPITAL COURSE
Patient is a 78-year-old female past medical history of hypertension, hyperlipidemia, CAD with stents, neuropathy seen as a trauma consult s/p fall at assisted living. Unable to ambulate afterwards. Trauma assessment in ED: ABCs intact , GCS 15 , AAOx3. Pan-scan and extremity xrays significant for left proximal femoral shaft fracture. Patient was admitted, seen by cardiology, medicine and EP for preop evaluation and PPM interrogation. Patient was deemed high risk for OR by cardiology. EP recommended an ischemic workup prior to patients discharge. Discussed with patient's private cardiologist Dr. Dang who was in agreement. patient was taken to the OR on 6/4 and underwent ORIF. Intraop pt required pressor pushes. Post-operatively patient was hypotensive, SICU was consulted and patient admitted to SICU for monitoring. Patient was downgraded on 6/7. She as WBAT LLE, worked with PT. She underwent cardiac workup as requested. Stress test was within normal limits. Patient was discahrged to SNF on 6/14. She will follow up outpatient with her orthopedic surgeon, cardiologist and EP. Patient is a 78-year-old female past medical history of hypertension, hyperlipidemia, CAD with stents, neuropathy seen as a trauma consult s/p fall at assisted living. Unable to ambulate afterwards. Trauma assessment in ED: ABCs intact , GCS 15 , AAOx3. Pan-scan and extremity xrays significant for left proximal femoral shaft fracture. Patient was admitted, seen by cardiology, medicine and EP for preop evaluation and PPM interrogation. Patient was deemed high risk for OR by cardiology. EP recommended an ischemic workup prior to patients discharge. Discussed with patient's private cardiologist Dr. Dang who was in agreement. patient was taken to the OR on 6/4 and underwent ORIF. Intraop pt required pressor pushes. Post-operatively patient was hypotensive, SICU was consulted and patient admitted to SICU for monitoring. Patient was downgraded on 6/7. She as WBAT LLE, worked with PT. She underwent cardiac workup as requested. Stress test was within normal limits. Patient was discharged to SNF on 6/14. She will continue lovenox for pot-op DVT prophylaxis x 1 month. She will follow up outpatient with her orthopedic surgeon, cardiologist and EP.

## 2023-06-14 NOTE — DISCHARGE NOTE NURSING/CASE MANAGEMENT/SOCIAL WORK - PATIENT PORTAL LINK FT
You can access the FollowMyHealth Patient Portal offered by [facility] by registering at the following website: [email or web address]. By joining [facility]’s FollowMyHealth portal, you will also be able to view your health information using other applications (apps) compatible with our system.

## 2023-06-14 NOTE — DISCHARGE NOTE PROVIDER - CARE PROVIDER_API CALL
Jamin Haque  Orthopaedic Surgery  [address] [facility] [place] [address]  Phone: [phone]  Fax: [phone]  Follow Up Time:     Cullen Dang  Cardiology  [address]  [place] [address]  Phone: [phone]  Fax: [phone]  Follow Up Time:

## 2023-06-14 NOTE — PROGRESS NOTE ADULT - SUBJECTIVE AND OBJECTIVE BOX
GENERAL SURGERY PROGRESS NOTE    Patient: RAFAEL NO , 78y ([birth year])Female   MRN: [id number]  Location: [address] ([address]) [address]  Visit: 06-03-23 Inpatient  Date: 06-14-23 @ 01:25    PAST MEDICAL & SURGICAL HISTORY:  Hypertension  HLD (hyperlipidemia)  CAD (coronary artery disease)  OA (osteoarthritis)  Compression fracture of spine  2015 lumbar  Aortic stenosis  Compression fracture of spine  Type 2 diabetes mellitus  diet controlled  OAB (overactive bladder)  Transient ischemic attack (TIA)  Jan 2014- with residual weakness on right leg  Falls  Neuropathy  History of coronary artery stent placement  KILEY to mLAD (Jan 2021)  History of cataract surgery  H/O oral surgery  History of complete heart block  MDT dual chamber 3/11/2021    Vitals:   T(F): 97.1 (06-14-23 @ 00:38), Max: 98.3 (06-13-23 @ 12:00)  HR: 69 (06-14-23 @ 00:38)  BP: 132/63 (06-14-23 @ 00:38)  RR: 18 (06-14-23 @ 00:38)  SpO2: 96% (06-14-23 @ 00:38)    Diet, NPO after Midnight:      NPO Start Date: 13-Jun-2023,   NPO Start Time: 23:59  Diet, Regular:   Consistent Carbohydrate Evening Snack  DASH/TLC Sodium & Cholesterol Restricted    Fluids:     I & O's:    06-12-23 @ 07:01  -  06-13-23 @ 07:00  --------------------------------------------------------  IN:  Total IN: 0 mL    OUT:    Voided (mL): 750 mL  Total OUT: 750 mL    Total NET: -750 mL    PHYSICAL EXAM:  General: NAD, AAOx3, calm and cooperative  Cardiac: RRR S1, S2  Respiratory: CTAB, normal respiratory effort  Abdomen: Soft, non-distended, non-tender, no rebound, no guarding. +BS.  Vascular: Pulses 2+ throughout, extremities well perfused  Skin: Warm/dry, normal color, no jaundice    MEDICATIONS  (STANDING):  acetaminophen     Tablet .. 650 milliGRAM(s) Oral every 6 hours  aspirin  chewable 81 milliGRAM(s) Oral daily  atorvastatin 20 milliGRAM(s) Oral at bedtime  calcium carbonate 1250 mG  + Vitamin D (OsCal 500 + D) 1 Tablet(s) Oral daily  chlorhexidine 2% Cloths 1 Application(s) Topical <User Schedule>  enoxaparin Injectable 40 milliGRAM(s) SubCutaneous every 24 hours  gabapentin 300 milliGRAM(s) Oral <User Schedule>  hydrochlorothiazide 12.5 milliGRAM(s) Oral daily  insulin lispro (ADMELOG) corrective regimen sliding scale   SubCutaneous Before meals and at bedtime  methocarbamol 750 milliGRAM(s) Oral three times a day  metoprolol tartrate 25 milliGRAM(s) Oral every 12 hours  multivitamin 1 Tablet(s) Oral daily  regadenoson Injectable 0.4 milliGRAM(s) IV Push once    MEDICATIONS  (PRN):  ketorolac   Injectable 15 milliGRAM(s) IV Push every 8 hours PRN Severe Pain (7 - 10)  melatonin 3 milliGRAM(s) Oral at bedtime PRN Insomnia    DVT PROPHYLAXIS: enoxaparin Injectable 40 milliGRAM(s) SubCutaneous every 24 hours    GI PROPHYLAXIS:   ANTICOAGULATION:   ANTIBIOTICS:      LAB/STUDIES:  Labs:  CAPILLARY BLOOD GLUCOSE    POCT Blood Glucose.: 140 mg/dL (13 Jun 2023 21:09)  POCT Blood Glucose.: 91 mg/dL (13 Jun 2023 18:04)  POCT Blood Glucose.: 112 mg/dL (13 Jun 2023 11:07)  POCT Blood Glucose.: 114 mg/dL (13 Jun 2023 07:22)                        8.8    8.25  )-----------( 353      ( 12 Jun 2023 21:57 )             26.9       06-12    132<L>  |  98  |  29<H>  ----------------------------<  120<H>  4.0   |  25  |  0.8

## 2023-06-14 NOTE — DISCHARGE NOTE PROVIDER - NSDCCPCAREPLAN_GEN_ALL_CORE_FT
PRINCIPAL DISCHARGE DIAGNOSIS  Diagnosis: Fall  Assessment and Plan of Treatment:       SECONDARY DISCHARGE DIAGNOSES  Diagnosis: Fracture, femur, shaft  Assessment and Plan of Treatment: s/p ORIF    Diagnosis: Hip fracture, left  Assessment and Plan of Treatment:

## 2023-06-14 NOTE — PROGRESS NOTE ADULT - REASON FOR ADMISSION
Left femur fracture

## 2023-06-14 NOTE — PROGRESS NOTE ADULT - PROVIDER SPECIALTY LIST ADULT
Orthopedics
SICU
SICU
Trauma Surgery
Orthopedics
Orthopedics
Trauma Surgery
Trauma Surgery
Hospitalist
Orthopedics
Orthopedics
Trauma Surgery
Trauma Surgery
Hospitalist
SICU
Trauma Surgery

## 2023-06-14 NOTE — CONSULT NOTE ADULT - SUBJECTIVE AND OBJECTIVE BOX
Patient is a 78y old  Female who presents with a chief complaint of Left femur fracture (07 Jun 2023 06:25)      SUBJECTIVE / OVERNIGHT EVENTS: Pt is now getting the 2nd part of her stress test today. No results from first part; awaiting note from cardiology. Held pt's metoprolol and hctz this am.   ADDITIONAL REVIEW OF SYSTEMS: as above    MEDICATIONS  (STANDING):  acetaminophen     Tablet .. 650 milliGRAM(s) Oral every 6 hours  aspirin  chewable 81 milliGRAM(s) Oral daily  atorvastatin 20 milliGRAM(s) Oral at bedtime  calcium carbonate 1250 mG  + Vitamin D (OsCal 500 + D) 1 Tablet(s) Oral daily  chlorhexidine 2% Cloths 1 Application(s) Topical <User Schedule>  enoxaparin Injectable 40 milliGRAM(s) SubCutaneous every 24 hours  gabapentin 300 milliGRAM(s) Oral every 8 hours  hydrochlorothiazide 12.5 milliGRAM(s) Oral daily  insulin lispro (ADMELOG) corrective regimen sliding scale subCutaneous Before meals and at bedtime  metoprolol tartrate 25 milliGRAM(s) Oral every 12 hours  multivitamin 1 Tablet(s) Oral daily    MEDICATIONS  (PRN):  ketorolac   Injectable 15 milliGRAM(s) IV Push every 6 hours PRN Severe Pain (7 - 10)      CAPILLARY BLOOD GLUCOSE      POCT Blood Glucose.: 120 mg/dL (08 Jun 2023 07:17)  POCT Blood Glucose.: 135 mg/dL (07 Jun 2023 21:33)  POCT Blood Glucose.: 125 mg/dL (07 Jun 2023 17:17)  POCT Blood Glucose.: 172 mg/dL (07 Jun 2023 11:33)    I&O's Summary    07 Jun 2023 07:01  -  08 Jun 2023 07:00  --------------------------------------------------------  IN: 0 mL / OUT: 780 mL / NET: -780 mL        PHYSICAL EXAM:  Vital Signs Last 24 Hrs  T(C): 36.6 (08 Jun 2023 08:38), Max: 36.6 (07 Jun 2023 12:00)  T(F): 97.9 (08 Jun 2023 08:38), Max: 97.9 (08 Jun 2023 08:38)  HR: 68 (08 Jun 2023 08:38) (68 - 88)  BP: 127/58 (08 Jun 2023 08:38) (103/64 - 159/72)  BP(mean): 81 (07 Jun 2023 20:00) (70 - 109)  RR: 18 (08 Jun 2023 08:38) (12 - 24)  SpO2: 98% (08 Jun 2023 08:38) (98% - 100%)    Parameters below as of 07 Jun 2023 20:00  Patient On (Oxygen Delivery Method): room air      CONSTITUTIONAL: NAD  ENMT: Moist oral mucosa, no pharyngeal injection or exudates; normal dentition  NECK: Supple, no palpable masses; no thyromegaly  RESPIRATORY: Normal respiratory effort; lungs are clear to auscultation bilaterally  CARDIOVASCULAR: Regular rate and rhythm, normal S1 and S2, no murmur/rub/gallop; No lower extremity edema; Peripheral pulses are 2+ bilaterally  ABDOMEN: Nontender to palpation, normoactive bowel sounds, no rebound/guarding; No hepatosplenomegaly  MUSCULOSKELETAL:  LLE s/p ORIF; no clubbing or cyanosis of digits; no joint swelling or tenderness to palpation  PSYCH: A+O to person, place, and time; affect appropriate  SKIN: No rashes; no palpable lesions    LABS:                        7.7    7.59  )-----------( 164      ( 07 Jun 2023 20:45 )             23.2     06-07    133<L>  |  102  |  27<H>  ----------------------------<  120<H>  4.0   |  22  |  1.0    Ca    7.7<L>      07 Jun 2023 20:45  Phos  2.8     06-07  Mg     2.0     06-07                  RADIOLOGY & ADDITIONAL TESTS:  Results Reviewed:   Imaging Personally Reviewed:  Electrocardiogram Personally Reviewed:    COORDINATION OF CARE:  Care Discussed with Consultants/Other Providers [Y/N]:  Prior or Outpatient Records Reviewed [Y/N]:

## 2023-06-14 NOTE — CONSULT NOTE ADULT - CONSULT REQUESTED DATE/TIME
03-Jun-2023 14:36
04-Jun-2023 12:31
14-Jun-2023 07:35
03-Jun-2023 18:21
03-Jun-2023 14:29
04-Jun-2023 16:16
08-Jun-2023 08:51
10-Ed-2023 06:53
03-Jun-2023 17:35
05-Jun-2023 16:37
09-Jun-2023 12:54
10-Ed-2023 06:53
13-Jun-2023 17:35

## 2023-06-14 NOTE — PROGRESS NOTE ADULT - ATTENDING SUPERVISION STATEMENT
Resident
Resident/Fellow
Resident/Fellow
Resident
Resident
Resident/Fellow
Resident/Fellow
Resident

## 2023-06-14 NOTE — DISCHARGE NOTE PROVIDER - NSFOLLOWUPCLINICS_GEN_ALL_ED_FT
[facility]  Trauma Surgery  [address] [place] [address]  Phone: [phone]  Fax:

## 2023-06-15 NOTE — CDI QUERY NOTE - NSCDIOTHERTXTBX_GEN_ALL_CORE_HH
Based on your professional judgment and the clinical indicators, please further clarify if the condition for which IV fluid boluses, albumin, and pressors were ordered can be further specified as:    • IV fluid boluses, albumin, and pressors were ordered due to concern for borderline distributive shock  • IV fluid boluses, albumin, and pressors were ordered due to concern for borderline hypovolemic shock  • IV fluid boluses, albumin, and pressors were ordered due to concern for hypotension without shock  • Other (please specify):  • Clinically unable to determine      CLINICAL INDICATORS    6/4 Consult Note Adult-SICU Nurse Practitioner/Attending:   - Reason for referral: hypotension post-op  - HPI: … extubated successfully in PACU but became hypotensive transiently responsive to 500cc 5% albumin bolus, 500cc LR bolus and phenylephrine IVP … prompting SICU consult. Lactate 2.3. Additional 500cc x 2 given; Post op Hgb 7.3 from 10, PRBC x1   - Assessment and Recommendation: … hypotension likely 2/2 hypovolemia postop; resuscitate with fluids and PRBC … suspected hypovolemia (Received 1L in OR; 500cc albumin + 500cc LR in PACU prior to SICU consult; additional 1L LR given in PACU = 3L given prior to arrival in ICU); No UOP intra-op …   - Attestation: Postoperative hypotension  - Plan: … keep MAP > 65; hypovolemic – IV bolus; on Levo at 0.03 mcg/kg/min at this time; transfuse 1 unit PRBC; follow H&H    6/6 Progress Note Adult-SICU Physician Assistant/Attending: hypotensive with MAPs ~55 on a-line, 70s on cuff, NICOM neg, f/u 16:00 CBC … suspected hypovolemia;       6/7 Progress Note Adult-SICU Resident/Attending: post-Op hypotension requiring pressors, symptomatic blood loss anemia. High risk for hemodynamic instability on pressors    Vital Signs:  6/4: (1858) BP 87/53, MAP 66; Arterial BP: 114/36, MAP 63; (2000) Arterial BP: 100/40; MAP 62; (2100) Arterial BP: 108/48, MAP 72; HR 90, RR 44; (2300) Arterial BP 99/35, Map 56;   6/5: (0000) Arterial /40, MAP 65; (0100) Arterial /38, MAP 63    Labs:  6/3: Hgb 10.0  6/4: (1503) Hgb 7.3, Hct 21.8; (2345) Hgb 6.6, Hct 19.4;   6/4: Lactate 2.3    Orders:  Levophed gtt (6/4 [id number])  Art Line (6/4 – 6/6)    6/4: NS 500cc IV bolus x 1 STAT; LR 250cc IV bolus x 1 STAT; LR 500cc IV bolus x 1 STAT; 5% Albumin 500mL IV x 1 STAT;   6/5: NS 250cc IV bolus x 1 STAT; LR at 75mL/hr  6/6: Lasix 10mg IVP x 1 STAT    Blood Bank:  6/4 (1546): 1U PRBC    6/5 (0021) 1U PRBC (ind: Hgb < 7); (1548) 1U PRBC (ind: hgb 7 … in setting of hypotension, DI);   6/8 : 1U PRBC Routine (ind: Hgb < 8)      Thank you,  Sherie MARTIN, RN, BSN  [phone]

## 2023-06-19 NOTE — CHART NOTE - NSCHARTNOTEFT_GEN_A_CORE
This 79 y/o female who was admitted to [facility] on 06/03/2023 with diagnoses of:    ASSESSMENT:  79 y/o female, S/P Fall.  Closed Left Shaft Femur Fracture.  S/P ORIF.  Acute pain due to trauma  Acute blood loss anemia.  Atelectasis.  At risk for hemodynamic instability.    Patient was transferred to SICU after Ortho procedure on 06/04/2023 due to persistent Postoperative hypotension.  Postoperatively patient required IV fluid boluses, albumin, and pressors. They were ordered due to concern for borderline hypovolemic shock.

## 2023-06-19 NOTE — CHART NOTE - NSCHARTNOTESELECT_GEN_ALL_CORE
Event Note
Nutrition RD/Event Note
Ortho Dressing Change/Event Note
ANESTHESIA to PACU NOTE/Transfer Note
SICU upgrade
Transfer Note

## 2023-07-10 ENCOUNTER — APPOINTMENT (OUTPATIENT)
Dept: ELECTROPHYSIOLOGY | Facility: CLINIC | Age: 78
End: 2023-07-10

## 2023-08-17 ENCOUNTER — APPOINTMENT (OUTPATIENT)
Dept: VASCULAR SURGERY | Facility: CLINIC | Age: 78
End: 2023-08-17
Payer: MEDICARE

## 2023-08-17 VITALS — WEIGHT: 147 LBS | BODY MASS INDEX: 23.07 KG/M2 | HEIGHT: 67 IN

## 2023-08-17 VITALS — SYSTOLIC BLOOD PRESSURE: 134 MMHG | DIASTOLIC BLOOD PRESSURE: 72 MMHG | HEART RATE: 71 BPM

## 2023-08-17 DIAGNOSIS — I70.245 ATHEROSCLEROSIS OF NATIVE ARTERIES OF LEFT LEG WITH ULCERATION OF OTHER PART OF FOOT: ICD-10-CM

## 2023-08-17 DIAGNOSIS — Z87.891 PERSONAL HISTORY OF NICOTINE DEPENDENCE: ICD-10-CM

## 2023-08-17 PROCEDURE — 93925 LOWER EXTREMITY STUDY: CPT

## 2023-08-17 PROCEDURE — 99203 OFFICE O/P NEW LOW 30 MIN: CPT

## 2023-08-17 RX ORDER — OXYBUTYNIN CHLORIDE 10 MG/1
10 TABLET, EXTENDED RELEASE ORAL
Refills: 0 | Status: ACTIVE | COMMUNITY

## 2023-08-17 RX ORDER — METHOCARBAMOL 750 MG/1
TABLET, FILM COATED ORAL
Refills: 0 | Status: ACTIVE | COMMUNITY

## 2023-08-17 RX ORDER — GABAPENTIN 300 MG/1
300 CAPSULE ORAL
Refills: 0 | Status: ACTIVE | COMMUNITY

## 2023-08-17 NOTE — REVIEW OF SYSTEMS
[As Noted in HPI] : as noted in HPI [Skin Wound] : skin wound [Negative] : Psychiatric [Fever] : no fever [Chills] : no chills [Feeling Poorly] : not feeling poorly [Feeling Tired] : not feeling tired

## 2023-08-17 NOTE — DATA REVIEWED
[FreeTextEntry1] : I performed an arterial duplex which was necessary to assess for the presence of peripheral arterial disease. It showed diffuse atherosclerotic disease, patent arteries bilaterally with no evidence of hemodynamically significant stenosis.

## 2023-08-17 NOTE — PHYSICAL EXAM
[FreeTextEntry1] : Gen: alert and oriented, NAD HEENT: PERR, membranes moist Respiratory: respirations unlabored, symmetric chest rise Cardio :RRR Extremities: warm well perfused, 1.5cm ulcerated lesion left heel with clean base and smooth edges, no drainage induration or erythema

## 2023-08-17 NOTE — HISTORY OF PRESENT ILLNESS
[FreeTextEntry1] : 77 yo F s/p recent L femur repair presents form nursing home with several week history of left heel ulceration. She has not been ambulatory for 6 weeks following femur surgery but is able to transfer.

## 2023-08-17 NOTE — ASSESSMENT
[FreeTextEntry1] : 77 yo F residing in a nursing home, non-ambulatory after left femur ORIF approximately 6 weeks ago who presents with 1.5cm L heel ulceration with clean base. Arterial duplex was normal.   Recommend local wound care, continuation of physical therapy, heel precautions and follow up as needed.

## 2023-08-25 NOTE — PHYSICAL THERAPY INITIAL EVALUATION ADULT - LEVEL OF INDEPENDENCE, REHAB EVAL
Received external e-mail request from management. E-mailed patient regarding cancellation list at this time. Created wait list for patient. Sent: Tuesday, August 22, 2023 8:32 AM  To: [email or web address]  Subject: [facility]  Dermatology - Make an Appointment      WARNING! Based upon the critical issue with [employer] targeting Healthcare systems ALL attachments and links from this email should be heavily scrutinized. First Name: Audra Amado Name: Henry Deanna  YOB: 1962  Email: [email or web address]  Phone: [phone]   Address: [address] [place] [address]: [place]  Zip: [address]  Best Time to be Reached: PM  Comments: Interested in a check up. Have itchy scalp so want to make sure it's nothing bad. supervision

## 2023-10-11 NOTE — PRE-OP CHECKLIST - LOOSE TEETH
no
no
Bed in lowest position, wheels locked, appropriate side rails in place/Call bell, personal items and telephone in reach/Instruct patient to call for assistance before getting out of bed or chair/Non-slip footwear when patient is out of bed/[place] to call system/Physically safe environment - no spills, clutter or unnecessary equipment/Purposeful Proactive Rounding/Room/bathroom lighting operational, light cord in reach

## 2025-04-07 NOTE — DISCHARGE NOTE PROVIDER - NSDCCPCAREPLAN_GEN_ALL_CORE_FT
07-Apr-2025
07-Apr-2025 10:32
07-Apr-2025 12:30
PRINCIPAL DISCHARGE DIAGNOSIS  Diagnosis: Aortic stenosis  Assessment and Plan of Treatment: sp    TAVR   PPM

## 2025-05-29 ENCOUNTER — APPOINTMENT (OUTPATIENT)
Dept: VASCULAR SURGERY | Facility: CLINIC | Age: 80
End: 2025-05-29
Payer: MEDICARE

## 2025-05-29 VITALS
SYSTOLIC BLOOD PRESSURE: 131 MMHG | HEIGHT: 67 IN | BODY MASS INDEX: 23.54 KG/M2 | WEIGHT: 150 LBS | DIASTOLIC BLOOD PRESSURE: 76 MMHG

## 2025-05-29 DIAGNOSIS — M79.89 OTHER SPECIFIED SOFT TISSUE DISORDERS: ICD-10-CM

## 2025-05-29 PROCEDURE — 99213 OFFICE O/P EST LOW 20 MIN: CPT

## 2025-05-29 PROCEDURE — 93971 EXTREMITY STUDY: CPT
